# Patient Record
Sex: MALE | Race: WHITE | NOT HISPANIC OR LATINO | Employment: FULL TIME | RURAL
[De-identification: names, ages, dates, MRNs, and addresses within clinical notes are randomized per-mention and may not be internally consistent; named-entity substitution may affect disease eponyms.]

---

## 2017-01-07 ENCOUNTER — CONVERSION ENCOUNTER (OUTPATIENT)
Dept: FAMILY MEDICINE CLINIC | Facility: CLINIC | Age: 57
End: 2017-01-07

## 2017-01-07 LAB — HBA1C MFR BLD: 8 %

## 2017-01-09 LAB
BASOPHILS # BLD AUTO: 25 CELLS/UL (ref 0–200)
BASOPHILS NFR BLD AUTO: 0.4 %
EOSINOPHIL # BLD AUTO: 296 CELLS/UL (ref 15–500)
EOSINOPHIL # BLD AUTO: 4.7 %
ERYTHROCYTE [DISTWIDTH] IN BLOOD BY AUTOMATED COUNT: 14.3 % (ref 11–15)
FERRITIN SERPL-MCNC: 28 NG/ML (ref 20–380)
FOLATE SERPL-MCNC: 14.4 NG/ML
HCT VFR BLD AUTO: 43.1 % (ref 38.5–50)
HGB BLD-MCNC: 13.8 G/DL (ref 13.2–17.1)
IRON SATN MFR SERPL: 15 % (CALC) (ref 15–60)
IRON SERPL-MCNC: 61 MCG/DL (ref 50–180)
LYMPHOCYTES # BLD AUTO: 1285 CELLS/UL (ref 850–3900)
LYMPHOCYTES NFR BLD AUTO: 20.4 %
MCH RBC QN AUTO: 27.2 PG (ref 27–33)
MCHC RBC AUTO-ENTMCNC: 32.1 G/DL (ref 32–36)
MCV RBC AUTO: 84.7 FL (ref 80–100)
MONOCYTES # BLD AUTO: 573 CELLS/UL (ref 200–950)
MONOCYTES NFR BLD AUTO: 9.1 %
NEUTROPHILS # BLD AUTO: 4120 CELLS/UL (ref 1500–7800)
NEUTROPHILS NFR BLD AUTO: 65.4 %
PLATELET # BLD AUTO: 310 10*3/UL (ref 140–400)
PMV BLD AUTO: 9.1 FL (ref 7.5–11.5)
RBC # BLD AUTO: 5.09 MILLION/UL (ref 4.2–5.8)
RETICS/RBC NFR MANUAL: NORMAL CELLS/UL (ref 25000–90000)
RETICULOCYTES PERCENT: 0.6 %
UIBC SERPL-MCNC: 399 UG/DL (ref 250–425)
VIT B12 SERPL-MCNC: 209 PG/ML (ref 200–1100)
WBC # BLD AUTO: 6.3 10*3/UL (ref 3.8–10.8)

## 2017-12-02 ENCOUNTER — CONVERSION ENCOUNTER (OUTPATIENT)
Dept: FAMILY MEDICINE CLINIC | Facility: CLINIC | Age: 57
End: 2017-12-02

## 2017-12-03 LAB
ALBUMIN SERPL-MCNC: 4 G/DL (ref 3.6–5.1)
ALP SERPL-CCNC: 70 U/L (ref 40–115)
ALT SERPL-CCNC: 16 U/L (ref 9–46)
AST SERPL-CCNC: 14 U/L (ref 10–35)
BILIRUB SERPL-MCNC: 0.4 MG/DL (ref 0.2–1.2)
BUN SERPL-MCNC: 27 MG/DL (ref 7–25)
BUN/CREAT SERPL: 22 (CALC) (ref 6–22)
CALCIUM SERPL-MCNC: 9.7 MG/DL (ref 8.6–10.3)
CHLORIDE SERPL-SCNC: 101 MMOL/L (ref 98–110)
CHOLEST SERPL-MCNC: 170 MG/DL
CHOLEST/HDLC SERPL: 4.1 (CALC)
CONV CO2: 29 MMOL/L (ref 20–31)
CONV TOTAL PROTEIN: 6.9 G/DL (ref 6.1–8.1)
CREAT UR-MCNC: 1.23 MG/DL (ref 0.7–1.33)
GLOBULIN UR ELPH-MCNC: 2.9 MG/DL (ref 1.9–3.7)
GLUCOSE UR QL: 145 MG/DL (ref 65–99)
HBA1C MFR BLD: 7.9 %
HDLC SERPL-MCNC: 41 MG/DL
INSULIN SERPL-ACNC: 1.4 (CALC) (ref 1–2.5)
LDLC SERPL CALC-MCNC: 109 MG/DL
NONHDLC SERPL-MCNC: 129 MG/DL
POTASSIUM SERPL-SCNC: 5.1 MMOL/L (ref 3.5–5.3)
SODIUM SERPL-SCNC: 137 MMOL/L (ref 135–146)
TRIGL SERPL-MCNC: 108 MG/DL

## 2017-12-06 ENCOUNTER — CONVERSION ENCOUNTER (OUTPATIENT)
Dept: FAMILY MEDICINE CLINIC | Facility: CLINIC | Age: 57
End: 2017-12-06

## 2018-06-16 ENCOUNTER — CONVERSION ENCOUNTER (OUTPATIENT)
Dept: FAMILY MEDICINE CLINIC | Facility: CLINIC | Age: 58
End: 2018-06-16

## 2018-06-17 LAB
ALBUMIN SERPL-MCNC: 4 G/DL (ref 3.6–5.1)
ALP SERPL-CCNC: 67 U/L (ref 40–115)
ALT SERPL-CCNC: 16 U/L (ref 9–46)
AST SERPL-CCNC: 16 U/L (ref 10–35)
BILIRUB SERPL-MCNC: 0.4 MG/DL (ref 0.2–1.2)
BUN SERPL-MCNC: 31 MG/DL (ref 7–25)
BUN/CREAT SERPL: 25 (CALC) (ref 6–22)
CALCIUM SERPL-MCNC: 9.5 MG/DL (ref 8.6–10.3)
CHLORIDE SERPL-SCNC: 105 MMOL/L (ref 98–110)
CHOLEST SERPL-MCNC: 153 MG/DL
CHOLEST/HDLC SERPL: 3.6 (CALC)
CONV CO2: 23 MMOL/L (ref 20–31)
CONV TOTAL PROTEIN: 6.6 G/DL (ref 6.1–8.1)
CREAT UR-MCNC: 1.25 MG/DL (ref 0.7–1.33)
GLOBULIN UR ELPH-MCNC: 2.6 MG/DL (ref 1.9–3.7)
GLUCOSE UR QL: 167 MG/DL (ref 65–99)
HBA1C MFR BLD: 7.3 %
HDLC SERPL-MCNC: 42 MG/DL
INSULIN SERPL-ACNC: 1.5 (CALC) (ref 1–2.5)
LDLC SERPL CALC-MCNC: 91 MG/DL
NONHDLC SERPL-MCNC: 111 MG/DL
POTASSIUM SERPL-SCNC: 5.1 MMOL/L (ref 3.5–5.3)
SODIUM SERPL-SCNC: 138 MMOL/L (ref 135–146)
TRIGL SERPL-MCNC: 103 MG/DL

## 2018-10-12 ENCOUNTER — HOSPITAL ENCOUNTER (OUTPATIENT)
Dept: SLEEP MEDICINE | Facility: HOSPITAL | Age: 58
Discharge: HOME | End: 2018-10-12
Attending: PSYCHIATRY & NEUROLOGY | Admitting: PSYCHIATRY & NEUROLOGY

## 2018-12-15 ENCOUNTER — CONVERSION ENCOUNTER (OUTPATIENT)
Dept: FAMILY MEDICINE CLINIC | Facility: CLINIC | Age: 58
End: 2018-12-15

## 2018-12-15 LAB
ALBUMIN SERPL-MCNC: 3.9 G/DL (ref 3.6–5.1)
ALP SERPL-CCNC: 63 U/L (ref 40–115)
ALT SERPL-CCNC: 17 U/L (ref 9–46)
AST SERPL-CCNC: 16 U/L (ref 10–35)
BILIRUB SERPL-MCNC: 0.4 MG/DL (ref 0.2–1.2)
BUN SERPL-MCNC: 30 MG/DL (ref 7–25)
BUN/CREAT SERPL: 23 (CALC) (ref 6–22)
CALCIUM SERPL-MCNC: 9.2 MG/DL (ref 8.6–10.3)
CHLORIDE SERPL-SCNC: 106 MMOL/L (ref 98–110)
CHOLEST SERPL-MCNC: 136 MG/DL
CHOLEST/HDLC SERPL: 3.6 (CALC)
CONV CO2: 24 MMOL/L (ref 20–32)
CONV TOTAL PROTEIN: 6.5 G/DL (ref 6.1–8.1)
CREAT UR-MCNC: 1.29 MG/DL (ref 0.7–1.25)
GLOBULIN UR ELPH-MCNC: 2.6 MG/DL (ref 1.9–3.7)
GLUCOSE UR QL: 99 MG/DL (ref 65–99)
HBA1C MFR BLD: 7.3 %
HDLC SERPL-MCNC: 38 MG/DL
INSULIN SERPL-ACNC: 1.5 (CALC) (ref 1–2.5)
LDLC SERPL CALC-MCNC: 78 MG/DL
NONHDLC SERPL-MCNC: 98 MG/DL
POTASSIUM SERPL-SCNC: 4.6 MMOL/L (ref 3.5–5.3)
SODIUM SERPL-SCNC: 139 MMOL/L (ref 135–146)
TRIGL SERPL-MCNC: 123 MG/DL

## 2019-05-20 ENCOUNTER — HOSPITAL ENCOUNTER (OUTPATIENT)
Dept: OTHER | Facility: HOSPITAL | Age: 59
Discharge: HOME | End: 2019-05-20
Attending: FAMILY MEDICINE | Admitting: FAMILY MEDICINE

## 2019-05-20 ENCOUNTER — CONVERSION ENCOUNTER (OUTPATIENT)
Dept: FAMILY MEDICINE CLINIC | Facility: CLINIC | Age: 59
End: 2019-05-20

## 2019-05-21 LAB
ALBUMIN SERPL-MCNC: 3.9 G/DL (ref 3.6–5.1)
ALP SERPL-CCNC: 73 U/L (ref 40–115)
ALT SERPL-CCNC: 18 U/L (ref 9–46)
AST SERPL-CCNC: 16 U/L (ref 10–35)
BASOPHILS # BLD AUTO: 62 CELLS/UL (ref 0–200)
BASOPHILS NFR BLD AUTO: 0.8 %
BILIRUB SERPL-MCNC: 0.3 MG/DL (ref 0.2–1.2)
BUN SERPL-MCNC: 26 MG/DL (ref 7–25)
BUN/CREAT SERPL: 20 (CALC) (ref 6–22)
CALCIUM SERPL-MCNC: 8.9 MG/DL (ref 8.6–10.3)
CHLORIDE SERPL-SCNC: 103 MMOL/L (ref 98–110)
CONV CO2: 23 MMOL/L (ref 20–32)
CONV TOTAL PROTEIN: 7 G/DL (ref 6.1–8.1)
CREAT UR-MCNC: 1.27 MG/DL (ref 0.7–1.25)
EOSINOPHIL # BLD AUTO: 169 CELLS/UL (ref 15–500)
EOSINOPHIL # BLD AUTO: 2.2 %
ERYTHROCYTE [DISTWIDTH] IN BLOOD BY AUTOMATED COUNT: 14.6 % (ref 11–15)
GLOBULIN UR ELPH-MCNC: 3.1 MG/DL (ref 1.9–3.7)
GLUCOSE UR QL: 189 MG/DL (ref 65–99)
HCT VFR BLD AUTO: 42.1 % (ref 38.5–50)
HGB BLD-MCNC: 13.2 G/DL (ref 13.2–17.1)
INSULIN SERPL-ACNC: 1.3 (CALC) (ref 1–2.5)
LYMPHOCYTES # BLD AUTO: 1471 CELLS/UL (ref 850–3900)
LYMPHOCYTES NFR BLD AUTO: 19.1 %
MCH RBC QN AUTO: 26.3 PG (ref 27–33)
MCHC RBC AUTO-ENTMCNC: 31.4 G/DL (ref 32–36)
MCV RBC AUTO: 83.9 FL (ref 80–100)
MONOCYTES # BLD AUTO: 585 CELLS/UL (ref 200–950)
MONOCYTES NFR BLD AUTO: 7.6 %
NEUTROPHILS # BLD AUTO: 5413 CELLS/UL (ref 1500–7800)
NEUTROPHILS NFR BLD AUTO: 70.3 %
PLATELET # BLD AUTO: 425 10*3/UL (ref 140–400)
PMV BLD AUTO: 9.5 FL (ref 7.5–12.5)
POTASSIUM SERPL-SCNC: 4.6 MMOL/L (ref 3.5–5.3)
RBC # BLD AUTO: 5.02 MILLION/UL (ref 4.2–5.8)
SODIUM SERPL-SCNC: 137 MMOL/L (ref 135–146)
WBC # BLD AUTO: 7.7 10*3/UL (ref 3.8–10.8)

## 2019-06-12 VITALS
HEART RATE: 77 BPM | DIASTOLIC BLOOD PRESSURE: 76 MMHG | RESPIRATION RATE: 16 BRPM | BODY MASS INDEX: 38.08 KG/M2 | HEIGHT: 70 IN | OXYGEN SATURATION: 98 % | WEIGHT: 266 LBS | SYSTOLIC BLOOD PRESSURE: 128 MMHG

## 2019-06-17 ENCOUNTER — TELEPHONE (OUTPATIENT)
Dept: FAMILY MEDICINE CLINIC | Facility: CLINIC | Age: 59
End: 2019-06-17

## 2019-06-25 ENCOUNTER — OFFICE VISIT (OUTPATIENT)
Dept: FAMILY MEDICINE CLINIC | Facility: CLINIC | Age: 59
End: 2019-06-25

## 2019-06-25 VITALS
WEIGHT: 265.2 LBS | SYSTOLIC BLOOD PRESSURE: 126 MMHG | OXYGEN SATURATION: 100 % | TEMPERATURE: 98.2 F | HEART RATE: 68 BPM | RESPIRATION RATE: 18 BRPM | DIASTOLIC BLOOD PRESSURE: 68 MMHG | BODY MASS INDEX: 40.19 KG/M2 | HEIGHT: 68 IN

## 2019-06-25 DIAGNOSIS — E13.9 OTHER SPECIFIED DIABETES MELLITUS WITHOUT COMPLICATION, WITHOUT LONG-TERM CURRENT USE OF INSULIN (HCC): ICD-10-CM

## 2019-06-25 DIAGNOSIS — D64.9 ANEMIA, UNSPECIFIED TYPE: Primary | ICD-10-CM

## 2019-06-25 DIAGNOSIS — E53.8 VITAMIN B 12 DEFICIENCY: ICD-10-CM

## 2019-06-25 DIAGNOSIS — I10 HYPERTENSION, BENIGN: ICD-10-CM

## 2019-06-25 DIAGNOSIS — E78.5 HYPERLIPIDEMIA, UNSPECIFIED HYPERLIPIDEMIA TYPE: ICD-10-CM

## 2019-06-25 LAB
BILIRUB BLD-MCNC: NEGATIVE MG/DL
CLARITY, POC: CLEAR
COLOR UR: YELLOW
GLUCOSE UR STRIP-MCNC: ABNORMAL MG/DL
KETONES UR QL: NEGATIVE
LEUKOCYTE EST, POC: NEGATIVE
NITRITE UR-MCNC: NEGATIVE MG/ML
PH UR: 5 [PH] (ref 5–8)
POC CREATININE URINE: 0
POC MICROALBUMIN URINE: 0
PROT UR STRIP-MCNC: NEGATIVE MG/DL
RBC # UR STRIP: NEGATIVE /UL
SP GR UR: 1.01 (ref 1–1.03)
UROBILINOGEN UR QL: NORMAL

## 2019-06-25 PROCEDURE — 82044 UR ALBUMIN SEMIQUANTITATIVE: CPT | Performed by: FAMILY MEDICINE

## 2019-06-25 PROCEDURE — 99213 OFFICE O/P EST LOW 20 MIN: CPT | Performed by: FAMILY MEDICINE

## 2019-06-25 PROCEDURE — 81002 URINALYSIS NONAUTO W/O SCOPE: CPT | Performed by: FAMILY MEDICINE

## 2019-06-25 RX ORDER — GLYBURIDE 5 MG/1
TABLET ORAL
COMMUNITY
Start: 2018-09-06 | End: 2019-11-29 | Stop reason: SDUPTHER

## 2019-06-25 RX ORDER — FLUTICASONE PROPIONATE 50 MCG
2 SPRAY, SUSPENSION (ML) NASAL
COMMUNITY
Start: 2018-09-06 | End: 2019-08-27 | Stop reason: SDUPTHER

## 2019-06-25 RX ORDER — METOPROLOL TARTRATE 50 MG/1
TABLET, FILM COATED ORAL DAILY
COMMUNITY
Start: 2018-09-06 | End: 2019-07-01 | Stop reason: SDUPTHER

## 2019-06-25 RX ORDER — LOVASTATIN 40 MG/1
TABLET ORAL
COMMUNITY
Start: 2018-09-06 | End: 2019-12-26

## 2019-06-25 RX ORDER — LISINOPRIL 20 MG/1
TABLET ORAL DAILY
COMMUNITY
Start: 2018-09-06 | End: 2019-08-19 | Stop reason: SDUPTHER

## 2019-06-25 RX ORDER — PIOGLITAZONEHYDROCHLORIDE 45 MG/1
TABLET ORAL DAILY
COMMUNITY
Start: 2018-09-06 | End: 2019-09-23 | Stop reason: SDUPTHER

## 2019-06-25 RX ORDER — LANCETS
EACH MISCELLANEOUS DAILY
COMMUNITY
Start: 2018-08-01 | End: 2020-01-22

## 2019-06-25 RX ORDER — CLOPIDOGREL BISULFATE 75 MG/1
TABLET ORAL DAILY
COMMUNITY
Start: 2018-09-06 | End: 2020-01-14 | Stop reason: SDUPTHER

## 2019-06-29 LAB
ALBUMIN SERPL-MCNC: 3.9 G/DL (ref 3.6–4.8)
ALBUMIN/GLOB SERPL: 1.3 {RATIO} (ref 1.2–2.2)
ALP SERPL-CCNC: 77 IU/L (ref 39–117)
ALT SERPL-CCNC: 24 IU/L (ref 0–44)
AST SERPL-CCNC: 15 IU/L (ref 0–40)
BASOPHILS # BLD AUTO: 0 X10E3/UL (ref 0–0.2)
BASOPHILS NFR BLD AUTO: 1 %
BILIRUB SERPL-MCNC: 0.2 MG/DL (ref 0–1.2)
BUN SERPL-MCNC: 31 MG/DL (ref 8–27)
BUN/CREAT SERPL: 21 (ref 10–24)
CALCIUM SERPL-MCNC: 9.3 MG/DL (ref 8.6–10.2)
CHLORIDE SERPL-SCNC: 105 MMOL/L (ref 96–106)
CHOLEST SERPL-MCNC: 173 MG/DL (ref 100–199)
CO2 SERPL-SCNC: 22 MMOL/L (ref 20–29)
CREAT SERPL-MCNC: 1.48 MG/DL (ref 0.76–1.27)
EOSINOPHIL # BLD AUTO: 0.2 X10E3/UL (ref 0–0.4)
EOSINOPHIL NFR BLD AUTO: 3 %
ERYTHROCYTE [DISTWIDTH] IN BLOOD BY AUTOMATED COUNT: 15.7 % (ref 12.3–15.4)
GLOBULIN SER CALC-MCNC: 3.1 G/DL (ref 1.5–4.5)
GLUCOSE SERPL-MCNC: 200 MG/DL (ref 65–99)
HBA1C MFR BLD: 8.5 % (ref 4.8–5.6)
HCT VFR BLD AUTO: 42.3 % (ref 37.5–51)
HDLC SERPL-MCNC: 37 MG/DL
HGB BLD-MCNC: 13 G/DL (ref 13–17.7)
IMM GRANULOCYTES # BLD AUTO: 0 X10E3/UL (ref 0–0.1)
IMM GRANULOCYTES NFR BLD AUTO: 0 %
LDLC SERPL CALC-MCNC: 95 MG/DL (ref 0–99)
LYMPHOCYTES # BLD AUTO: 1.3 X10E3/UL (ref 0.7–3.1)
LYMPHOCYTES NFR BLD AUTO: 21 %
MCH RBC QN AUTO: 26.8 PG (ref 26.6–33)
MCHC RBC AUTO-ENTMCNC: 30.7 G/DL (ref 31.5–35.7)
MCV RBC AUTO: 87 FL (ref 79–97)
MONOCYTES # BLD AUTO: 0.6 X10E3/UL (ref 0.1–0.9)
MONOCYTES NFR BLD AUTO: 11 %
NEUTROPHILS # BLD AUTO: 3.9 X10E3/UL (ref 1.4–7)
NEUTROPHILS NFR BLD AUTO: 64 %
PLATELET # BLD AUTO: 327 X10E3/UL (ref 150–450)
POTASSIUM SERPL-SCNC: 5.2 MMOL/L (ref 3.5–5.2)
PROT SERPL-MCNC: 7 G/DL (ref 6–8.5)
RBC # BLD AUTO: 4.85 X10E6/UL (ref 4.14–5.8)
SODIUM SERPL-SCNC: 142 MMOL/L (ref 134–144)
TRIGL SERPL-MCNC: 203 MG/DL (ref 0–149)
VIT B2 BLD-MCNC: 200 UG/L (ref 137–370)
VLDLC SERPL CALC-MCNC: 41 MG/DL (ref 5–40)
WBC # BLD AUTO: 6.1 X10E3/UL (ref 3.4–10.8)

## 2019-06-30 ENCOUNTER — RESULTS ENCOUNTER (OUTPATIENT)
Dept: FAMILY MEDICINE CLINIC | Facility: CLINIC | Age: 59
End: 2019-06-30

## 2019-06-30 DIAGNOSIS — D64.9 ANEMIA, UNSPECIFIED TYPE: ICD-10-CM

## 2019-06-30 DIAGNOSIS — E13.9 OTHER SPECIFIED DIABETES MELLITUS WITHOUT COMPLICATION, WITHOUT LONG-TERM CURRENT USE OF INSULIN (HCC): ICD-10-CM

## 2019-06-30 DIAGNOSIS — E53.8 VITAMIN B 12 DEFICIENCY: ICD-10-CM

## 2019-06-30 DIAGNOSIS — E78.5 HYPERLIPIDEMIA, UNSPECIFIED HYPERLIPIDEMIA TYPE: ICD-10-CM

## 2019-07-01 ENCOUNTER — OFFICE VISIT (OUTPATIENT)
Dept: FAMILY MEDICINE CLINIC | Facility: CLINIC | Age: 59
End: 2019-07-01

## 2019-07-01 VITALS
OXYGEN SATURATION: 99 % | WEIGHT: 265 LBS | RESPIRATION RATE: 18 BRPM | BODY MASS INDEX: 37.94 KG/M2 | SYSTOLIC BLOOD PRESSURE: 126 MMHG | DIASTOLIC BLOOD PRESSURE: 78 MMHG | HEIGHT: 70 IN | TEMPERATURE: 97.2 F | HEART RATE: 67 BPM

## 2019-07-01 DIAGNOSIS — J30.1 SEASONAL ALLERGIC RHINITIS DUE TO POLLEN: ICD-10-CM

## 2019-07-01 DIAGNOSIS — R79.9 ELEVATED BUN: ICD-10-CM

## 2019-07-01 DIAGNOSIS — E53.8 VITAMIN B12 DEFICIENCY: ICD-10-CM

## 2019-07-01 DIAGNOSIS — Z86.73 HISTORY OF STROKE: ICD-10-CM

## 2019-07-01 DIAGNOSIS — E11.3293 TYPE 2 DIABETES MELLITUS WITH BOTH EYES AFFECTED BY MILD NONPROLIFERATIVE RETINOPATHY WITHOUT MACULAR EDEMA, WITHOUT LONG-TERM CURRENT USE OF INSULIN (HCC): ICD-10-CM

## 2019-07-01 DIAGNOSIS — I51.7 LEFT ATRIAL ENLARGEMENT: ICD-10-CM

## 2019-07-01 DIAGNOSIS — E11.21 DIABETIC NEPHROPATHY ASSOCIATED WITH TYPE 2 DIABETES MELLITUS (HCC): ICD-10-CM

## 2019-07-01 DIAGNOSIS — E11.9 TYPE 2 DIABETES MELLITUS WITHOUT COMPLICATION, UNSPECIFIED WHETHER LONG TERM INSULIN USE (HCC): ICD-10-CM

## 2019-07-01 DIAGNOSIS — Z00.00 ADULT GENERAL MEDICAL EXAMINATION: ICD-10-CM

## 2019-07-01 DIAGNOSIS — E78.2 MIXED HYPERLIPIDEMIA: Primary | ICD-10-CM

## 2019-07-01 DIAGNOSIS — H35.372 EPIRETINAL MEMBRANE, LEFT: ICD-10-CM

## 2019-07-01 DIAGNOSIS — L98.9 SKIN LESION: ICD-10-CM

## 2019-07-01 DIAGNOSIS — R53.83 MALAISE AND FATIGUE: ICD-10-CM

## 2019-07-01 DIAGNOSIS — Z85.46 HISTORY OF PROSTATE CANCER: ICD-10-CM

## 2019-07-01 DIAGNOSIS — I10 HYPERTENSION, BENIGN: ICD-10-CM

## 2019-07-01 DIAGNOSIS — Z82.49 FAMILY HISTORY OF ISCHEMIC HEART DISEASE: ICD-10-CM

## 2019-07-01 DIAGNOSIS — N28.9 RENAL INSUFFICIENCY, MILD: ICD-10-CM

## 2019-07-01 DIAGNOSIS — G47.33 OBSTRUCTIVE SLEEP APNEA: ICD-10-CM

## 2019-07-01 DIAGNOSIS — F43.9 SITUATIONAL STRESS: ICD-10-CM

## 2019-07-01 DIAGNOSIS — E66.3 OVERWEIGHT: ICD-10-CM

## 2019-07-01 DIAGNOSIS — D50.9 MICROCYTIC ANEMIA: ICD-10-CM

## 2019-07-01 DIAGNOSIS — H25.093 AGE-RELATED INCIPIENT CATARACT OF BOTH EYES: ICD-10-CM

## 2019-07-01 DIAGNOSIS — R53.81 MALAISE AND FATIGUE: ICD-10-CM

## 2019-07-01 PROBLEM — H26.9 CATARACTA: Status: ACTIVE | Noted: 2019-07-01

## 2019-07-01 PROBLEM — E78.5 HYPERLIPIDEMIA: Status: ACTIVE | Noted: 2018-09-06

## 2019-07-01 PROBLEM — N19 RENAL FAILURE: Status: ACTIVE | Noted: 2019-07-01

## 2019-07-01 PROBLEM — N40.0 BPH (BENIGN PROSTATIC HYPERPLASIA): Status: ACTIVE | Noted: 2019-07-01

## 2019-07-01 PROCEDURE — 99396 PREV VISIT EST AGE 40-64: CPT | Performed by: FAMILY MEDICINE

## 2019-07-01 PROCEDURE — 99214 OFFICE O/P EST MOD 30 MIN: CPT | Performed by: FAMILY MEDICINE

## 2019-07-01 RX ORDER — CETIRIZINE HYDROCHLORIDE 10 MG/1
10 TABLET ORAL DAILY
COMMUNITY
End: 2020-04-21

## 2019-07-01 RX ORDER — METOPROLOL SUCCINATE 50 MG/1
50 TABLET, EXTENDED RELEASE ORAL DAILY
Qty: 30 TABLET | Refills: 5 | Status: SHIPPED | OUTPATIENT
Start: 2019-07-01 | End: 2019-12-26

## 2019-07-01 NOTE — PROGRESS NOTES
Subjective   Thor Crouch is a 58 y.o. male here for his annual physical with me. Thor is here for coordination of medical care, to discuss health maintenance, disease prevention as well as to followup on medical problems. Patient has been followed by me since 2003. Patient's last CPE was 6-19-18. Activity level is minimal. Exercises 0 per week. Appetite is Good. Feels  WELL with no complaints. Energy level is Good.  Sleeps  WELL. Patient's last colonoscopy was never. Patient is not doing routine self skin exam. Patient is not doing routine self-breast exams.  Patient is not doing routine testicular exams.    Hypertension   This is a chronic problem. The current episode started more than 1 year ago. The problem is unchanged. The problem is controlled. Pertinent negatives include no blurred vision, chest pain, neck pain, palpitations or shortness of breath. Risk factors for coronary artery disease include diabetes mellitus, dyslipidemia and obesity.   Hyperlipidemia   This is a chronic problem. The current episode started more than 1 year ago. Recent lipid tests were reviewed and are normal. Exacerbating diseases include diabetes and obesity. There are no known factors aggravating his hyperlipidemia. Pertinent negatives include no chest pain, myalgias or shortness of breath.   Allergic Reaction   This is a chronic problem. The current episode started more than 1 week ago. The problem occurs intermittently. The problem is unchanged. The problem is mild. Pertinent negatives include no abdominal pain, chest pain, coughing, diarrhea, eye redness, rash, stridor, vomiting or wheezing.   Anemia   Presents for follow-up visit. There has been no abdominal pain, bruising/bleeding easily, fever, light-headedness, palpitations or weight loss. There are no compliance problems.         The following portions of the patient's history were reviewed and updated as appropriate: allergies, current medications, past family history, past  "medical history, past social history, past surgical history and problem list.    Review of Systems   Constitutional: Negative for appetite change, chills, fatigue, fever, unexpected weight gain and unexpected weight loss.   HENT: Negative for congestion, dental problem, ear discharge, ear pain, hearing loss, nosebleeds, postnasal drip, rhinorrhea, sinus pressure, sneezing, sore throat, tinnitus and voice change.         Dentist visit was 7-2013, [facility]-advised to follow   Eyes: Negative for blurred vision, double vision, pain, redness and visual disturbance.        2-2019, Dr. Murcia-stable   Respiratory: Negative for cough, shortness of breath, wheezing and stridor.    Cardiovascular: Negative for chest pain, palpitations and leg swelling.   Gastrointestinal: Negative for abdominal pain, anal bleeding, blood in stool, constipation, diarrhea, nausea, rectal pain, vomiting, GERD and indigestion.        7 BM weekly   Endocrine: Negative for cold intolerance, heat intolerance, polydipsia, polyphagia and polyuria.        Sex Drive He is a 0  She is a 0   Genitourinary: Negative for difficulty urinating, dysuria, frequency, hematuria and urgency.   Musculoskeletal: Negative for back pain, joint swelling, myalgias, neck pain and neck stiffness.   Skin: Negative for color change, dry skin and rash.   Neurological: Negative for dizziness, syncope, speech difficulty, weakness, light-headedness, headache and memory problem.   Hematological: Does not bruise/bleed easily.   Psychiatric/Behavioral: Negative for decreased concentration, sleep disturbance, depressed mood and stress. The patient is not nervous/anxious.        Objective   Visit Vitals  /78 (BP Location: Left arm, Patient Position: Sitting, Cuff Size: Large Adult)   Pulse 67   Temp 97.2 °F (36.2 °C) (Oral)   Resp 18   Ht 177.8 cm (70\")   Wt 120 kg (265 lb)   SpO2 99%   BMI 38.02 kg/m²     Physical Exam   Constitutional: He is oriented to person, place, and " time. He appears well-developed and well-nourished. No distress.   HENT:   Head: Normocephalic.   Right Ear: Hearing and external ear normal.   Left Ear: Hearing and external ear normal.   Nose: Nose normal.   Mouth/Throat: Oropharynx is clear and moist. No oropharyngeal exudate.   Eyes: Conjunctivae, EOM and lids are normal. Pupils are equal, round, and reactive to light. Right eye exhibits no discharge. Left eye exhibits no discharge. No scleral icterus.   Neck: Trachea normal, normal range of motion and full passive range of motion without pain. Neck supple.   Cardiovascular: Normal rate, regular rhythm, normal heart sounds and intact distal pulses.   No murmur heard.  Pulses:       Dorsalis pedis pulses are 0 on the right side, and 0 on the left side.        Posterior tibial pulses are 0 on the right side, and 1+ on the left side.   Pulmonary/Chest: Effort normal and breath sounds normal. He has no wheezes. He exhibits no tenderness.   Abdominal: Soft. Bowel sounds are normal. He exhibits no distension and no mass. There is no tenderness. No hernia.   Genitourinary: Rectum normal, prostate normal and penis normal. No penile tenderness.   Musculoskeletal: Normal range of motion. He exhibits no edema, tenderness or deformity.   Lymphadenopathy:     He has no cervical adenopathy.   Neurological: He is alert and oriented to person, place, and time. He has normal strength. He displays normal reflexes. No cranial nerve deficit or sensory deficit. He exhibits normal muscle tone. Coordination normal.   Skin: Skin is warm and dry. Lesion noted. No rash noted. He is not diaphoretic. No erythema.   Blue nevus of the right inner thigh and left inner thigh.  Brown circular lesion of the upper abdomen.  Onychomycosis of the left toe nails-mild.     Psychiatric: He has a normal mood and affect. His behavior is normal. Judgment and thought content normal.       Assessment/Plan    Problem List Items Addressed This Visit         Cardiovascular and Mediastinum    Hyperlipidemia - Primary    Overview     Labs done 6-26-19, read by me, reviewed with pt.  Trig. 203 up from 123, tot. Chol. 173 up from 136, HDL 37 down from 38, LDL 95 up from 78.  Worsening   Encouraged to watch fatty intake, exercise more, and lose weight. Compliant with medication because   Is not getting adequate diet and exercise  Goals developed at last visit were not met because  Follow up in 3  months  Care management needs are self-addressed.   Self-management abilities addressed and patient is capable of managing his own disease.             Relevant Orders    Comprehensive metabolic panel    Lipid Panel w/ Chol/HDL Ratio    Hypertension, benign    Overview     Doing well.   Encouraged to watch salt, exercise more and lose weight         Relevant Medications    metoprolol succinate XL (TOPROL XL) 50 MG 24 hr tablet    Left atrial enlargement    Relevant Medications    metoprolol succinate XL (TOPROL XL) 50 MG 24 hr tablet       Respiratory    Obstructive sleep apnea    Current Assessment & Plan     Doing well with C-Pap         Seasonal allergic rhinitis due to pollen    Current Assessment & Plan     Stable            Digestive    Vitamin B12 deficiency    Overview     Doing well.  Labs done 6-26-19, read by me, reviewed with pt.  B12 was 200              Endocrine    Diabetic nephropathy associated with type 2 diabetes mellitus (CMS/HCC)    Relevant Medications    Dapagliflozin Propanediol (FARXIGA) 10 MG tablet    Type 2 diabetes mellitus without complications (CMS/HCC)    Overview     Labs done 6-26-19, read by me, reviewed with pt.A1c was 8.5 up from 7.6.  Worsening.  Increase Farxiga to 10mg daily.  Encouraged to watch sugar intake, exercise more and lose weight.   Compliant with medication.   Monitoring sugar at home.   Follow up in 3 months  Care management needs are self-addressed.  Self-management abilities addressed and patient is capable of managing his own  disease.             Relevant Medications    Dapagliflozin Propanediol (FARXIGA) 10 MG tablet    Other Relevant Orders    Hemoglobin A1c    Type 2 diabetes mellitus with both eyes affected by mild nonproliferative retinopathy without macular edema, without long-term current use of insulin (CMS/[facility])    Relevant Medications    Dapagliflozin Propanediol (FARXIGA) 10 MG tablet       Musculoskeletal and Integument    Skin lesion    Current Assessment & Plan     Stable, followed with [facility].            Genitourinary    Renal insufficiency, mild    Overview      Labs done 6-26-19, read by me, reviewed with pt. Creatinine was 1.48 up from 1.27, EGFR was 51 down from 61            Hematopoietic and Hemostatic    Microcytic anemia    Overview     Doing well.   Labs done 6-26-19, read by me, reviewed with pt.  CBC was normal except MCHC was 30.7 down from 31.4, RDW was 15.7 up from 14.6         Relevant Orders    CBC w AUTO Differential       Other    Adult general medical examination    Overview     Medical record thoroughly reviewed and summarized in EMR, since last PE         Cataract of both eyes    Current Assessment & Plan     Stable, followed with Dr. Murcia, will obtain notes from Dr. Murcia         Elevated BUN    Overview     Worse.  Labs done 6-26-19, read by me, reviewed with pt.  BUN was 31 up from 26         Epiretinal membrane, left    Overview     Followed with Dr. Murcia         Current Assessment & Plan     Stable, followed with Dr. Murcia, will obtain notes from Dr. Murcia         Family history of ischemic heart disease    Overview     -Father MI at age 74, Mother had a stent at 70.  ---keep risk factors low.         History of prostate cancer    Current Assessment & Plan     Followed with DR. Washington         History of stroke    Current Assessment & Plan     Continue Plavix         Overweight    Current Assessment & Plan     Worse, pt. Gained 6 Lbs         Situational stress    Overview      Greatly improved         RESOLVED: Malaise and fatigue        Diagnoses and all orders for this visit:    1. Mixed hyperlipidemia (Primary)    2. Hypertension, benign    3. Left atrial enlargement    4. Obstructive sleep apnea    5. Seasonal allergic rhinitis due to pollen    6. Vitamin B12 deficiency    7. Diabetic nephropathy associated with type 2 diabetes mellitus (CMS/HCC)    8. Type 2 diabetes mellitus with both eyes affected by mild nonproliferative retinopathy without macular edema, without long-term current use of insulin (CMS/[facility])    9. Type 2 diabetes mellitus without complication, unspecified whether long term insulin use (CMS/[facility])    10. Skin lesion    11. Renal insufficiency, mild    12. Microcytic anemia    13. Adult general medical examination    14. Age-related incipient cataract of both eyes    15. Elevated BUN    16. Epiretinal membrane, left    17. Family history of ischemic heart disease    18. History of prostate cancer    19. History of stroke    20. Malaise and fatigue    21. Overweight    22. Situational stress             Encouraged to check his skin, testicles and breasts monthly. Reviewed immunizations and if due, patient counselled to check with insurance company for coverage;.

## 2019-07-01 NOTE — PROGRESS NOTES
Subjective   Thor Crouch is a 58 y.o. male.   Chief Complaint   Patient presents with   • Diabetes   • Hypertension     Diabetes   He presents for his follow-up diabetic visit. He has type 2 diabetes mellitus. No MedicAlert identification noted. There are no hypoglycemic associated symptoms. There are no diabetic associated symptoms. Pertinent negatives for diabetes include no chest pain and no fatigue. There are no hypoglycemic complications. Symptoms are stable. Diabetic complications include a CVA. Risk factors for coronary artery disease include diabetes mellitus, dyslipidemia and hypertension. Current diabetic treatment includes oral agent (triple therapy). He is compliant with treatment all of the time. He is following a diabetic diet. When asked about meal planning, he reported none. He has not had a previous visit with a dietitian.   Hypertension   This is a chronic problem. The current episode started more than 1 year ago. The problem is controlled. Pertinent negatives include no chest pain, palpitations or shortness of breath. Risk factors for coronary artery disease include diabetes mellitus, dyslipidemia and obesity. Hypertensive end-organ damage includes CVA.        The following portions of the patient's history were reviewed and updated as appropriate: allergies, current medications, past family history, past medical history, past social history, past surgical history and problem list.    Review of Systems   Constitutional: Negative for fatigue.   HENT: Negative for hearing loss.    Respiratory: Negative for shortness of breath.    Cardiovascular: Negative for chest pain and palpitations.   Genitourinary: Negative for frequency.        Nocturia   Musculoskeletal: Negative for joint swelling.   Neurological: Negative for memory problem.       Objective   Visit Vitals  /68 (BP Location: Right arm, Patient Position: Sitting, Cuff Size: Large Adult)   Pulse 68   Temp 98.2 °F (36.8 °C) (Oral)   Resp  "18   Ht 172.7 cm (68\")   Wt 120 kg (265 lb 3.2 oz)   SpO2 100%   BMI 40.32 kg/m²     Physical Exam   Constitutional: He is oriented to person, place, and time. He appears well-developed and well-nourished. He is cooperative.   HENT:   Head: Normocephalic.   Neck: Trachea normal. Neck supple. Carotid bruit is not present. No thyromegaly present.   Cardiovascular: Normal rate, regular rhythm and normal heart sounds. Exam reveals no gallop and no friction rub.   No murmur heard.  Pulmonary/Chest: Effort normal and breath sounds normal.   Neurological: He is alert and oriented to person, place, and time.   Skin: Skin is dry. No rash noted. Nails show no clubbing.   Vitals reviewed.      Assessment/Plan   Problem List Items Addressed This Visit        Cardiovascular and Mediastinum    Hyperlipidemia    Current Assessment & Plan     Labs drawn today, pt. will return for results at PE2         Relevant Medications    lovastatin (MEVACOR) 40 MG tablet    Other Relevant Orders    Comprehensive metabolic panel    Lipid panel    Hypertension, benign    Overview     Doing well.   Encouraged to watch salt, exercise more and lose weight         Relevant Medications    lisinopril (PRINIVIL,ZESTRIL) 20 MG tablet      Other Visit Diagnoses     Anemia, unspecified type    -  Primary    Relevant Orders    CBC w AUTO Differential    Vitamin B 12 deficiency        Relevant Orders    Vitamin B2    Other specified diabetes mellitus without complication, without long-term current use of insulin (CMS/[facility])        Relevant Medications    glyBURIDE (DIAbeta) 5 MG tablet    pioglitazone (ACTOS) 45 MG tablet    SITagliptin-metFORMIN HCl ER (JANUMET XR) 100-1000 MG tablet    Other Relevant Orders    Hemoglobin A1c    POCT microalbumin (Completed)    POCT urinalysis dipstick, manual (Completed)             "

## 2019-08-19 RX ORDER — LISINOPRIL 20 MG/1
TABLET ORAL
Qty: 90 TABLET | Refills: 1 | Status: SHIPPED | OUTPATIENT
Start: 2019-08-19 | End: 2019-09-18 | Stop reason: SDUPTHER

## 2019-08-27 RX ORDER — FLUTICASONE PROPIONATE 50 MCG
SPRAY, SUSPENSION (ML) NASAL
Qty: 16 ML | Refills: 3 | Status: SHIPPED | OUTPATIENT
Start: 2019-08-27 | End: 2020-04-21 | Stop reason: SDUPTHER

## 2019-09-05 ENCOUNTER — TELEPHONE (OUTPATIENT)
Dept: FAMILY MEDICINE CLINIC | Facility: CLINIC | Age: 59
End: 2019-09-05

## 2019-09-18 RX ORDER — LISINOPRIL 20 MG/1
TABLET ORAL
Qty: 90 TABLET | Refills: 0 | Status: SHIPPED | OUTPATIENT
Start: 2019-09-18 | End: 2020-04-21 | Stop reason: SDUPTHER

## 2019-09-23 RX ORDER — PIOGLITAZONEHYDROCHLORIDE 45 MG/1
45 TABLET ORAL DAILY
Qty: 30 TABLET | Refills: 5 | Status: SHIPPED | OUTPATIENT
Start: 2019-09-23 | End: 2020-04-21

## 2019-10-09 ENCOUNTER — TELEPHONE (OUTPATIENT)
Dept: FAMILY MEDICINE CLINIC | Facility: CLINIC | Age: 59
End: 2019-10-09

## 2019-10-09 LAB
ALBUMIN SERPL-MCNC: 4.5 G/DL (ref 3.6–4.8)
ALBUMIN/GLOB SERPL: 2 {RATIO} (ref 1.2–2.2)
ALP SERPL-CCNC: 79 IU/L (ref 39–117)
ALT SERPL-CCNC: 24 IU/L (ref 0–44)
AST SERPL-CCNC: 18 IU/L (ref 0–40)
BASOPHILS # BLD AUTO: 0 X10E3/UL (ref 0–0.2)
BASOPHILS NFR BLD AUTO: 1 %
BILIRUB SERPL-MCNC: 0.2 MG/DL (ref 0–1.2)
BUN SERPL-MCNC: 21 MG/DL (ref 8–27)
BUN/CREAT SERPL: 17 (ref 10–24)
CALCIUM SERPL-MCNC: 9.3 MG/DL (ref 8.6–10.2)
CHLORIDE SERPL-SCNC: 102 MMOL/L (ref 96–106)
CHOLEST SERPL-MCNC: 143 MG/DL (ref 100–199)
CO2 SERPL-SCNC: 21 MMOL/L (ref 20–29)
CREAT SERPL-MCNC: 1.27 MG/DL (ref 0.76–1.27)
EOSINOPHIL # BLD AUTO: 0.3 X10E3/UL (ref 0–0.4)
EOSINOPHIL NFR BLD AUTO: 5 %
ERYTHROCYTE [DISTWIDTH] IN BLOOD BY AUTOMATED COUNT: 14.7 % (ref 12.3–15.4)
GLOBULIN SER CALC-MCNC: 2.2 G/DL (ref 1.5–4.5)
GLUCOSE SERPL-MCNC: 220 MG/DL (ref 65–99)
HBA1C MFR BLD: 8.3 % (ref 4.8–5.6)
HCT VFR BLD AUTO: 43.8 % (ref 37.5–51)
HDLC SERPL-MCNC: 35 MG/DL
HGB BLD-MCNC: 13.7 G/DL (ref 13–17.7)
IMM GRANULOCYTES # BLD AUTO: 0 X10E3/UL (ref 0–0.1)
IMM GRANULOCYTES NFR BLD AUTO: 0 %
LDLC SERPL CALC-MCNC: 77 MG/DL (ref 0–99)
LYMPHOCYTES # BLD AUTO: 1.2 X10E3/UL (ref 0.7–3.1)
LYMPHOCYTES NFR BLD AUTO: 20 %
MCH RBC QN AUTO: 27.7 PG (ref 26.6–33)
MCHC RBC AUTO-ENTMCNC: 31.3 G/DL (ref 31.5–35.7)
MCV RBC AUTO: 89 FL (ref 79–97)
MONOCYTES # BLD AUTO: 0.6 X10E3/UL (ref 0.1–0.9)
MONOCYTES NFR BLD AUTO: 11 %
NEUTROPHILS # BLD AUTO: 3.9 X10E3/UL (ref 1.4–7)
NEUTROPHILS NFR BLD AUTO: 63 %
PLATELET # BLD AUTO: 317 X10E3/UL (ref 150–450)
POTASSIUM SERPL-SCNC: 4.9 MMOL/L (ref 3.5–5.2)
PROT SERPL-MCNC: 6.7 G/DL (ref 6–8.5)
RBC # BLD AUTO: 4.95 X10E6/UL (ref 4.14–5.8)
SODIUM SERPL-SCNC: 139 MMOL/L (ref 134–144)
TRIGL SERPL-MCNC: 155 MG/DL (ref 0–149)
VIT B2 BLD-MCNC: 188 UG/L (ref 137–370)
VLDLC SERPL CALC-MCNC: 31 MG/DL (ref 5–40)
WBC # BLD AUTO: 6.1 X10E3/UL (ref 3.4–10.8)

## 2019-10-14 ENCOUNTER — OFFICE VISIT (OUTPATIENT)
Dept: FAMILY MEDICINE CLINIC | Facility: CLINIC | Age: 59
End: 2019-10-14

## 2019-10-14 VITALS
HEIGHT: 60 IN | TEMPERATURE: 97.7 F | BODY MASS INDEX: 52.06 KG/M2 | SYSTOLIC BLOOD PRESSURE: 136 MMHG | RESPIRATION RATE: 18 BRPM | DIASTOLIC BLOOD PRESSURE: 74 MMHG | OXYGEN SATURATION: 98 % | WEIGHT: 265.2 LBS | HEART RATE: 78 BPM

## 2019-10-14 DIAGNOSIS — N28.9 RENAL INSUFFICIENCY, MILD: ICD-10-CM

## 2019-10-14 DIAGNOSIS — E78.2 MIXED HYPERLIPIDEMIA: Primary | ICD-10-CM

## 2019-10-14 DIAGNOSIS — R79.9 ELEVATED BUN: ICD-10-CM

## 2019-10-14 DIAGNOSIS — D50.9 MICROCYTIC ANEMIA: ICD-10-CM

## 2019-10-14 DIAGNOSIS — I10 HYPERTENSION, BENIGN: ICD-10-CM

## 2019-10-14 DIAGNOSIS — E11.9 TYPE 2 DIABETES MELLITUS WITHOUT COMPLICATION, WITHOUT LONG-TERM CURRENT USE OF INSULIN (HCC): ICD-10-CM

## 2019-10-14 PROCEDURE — 99214 OFFICE O/P EST MOD 30 MIN: CPT | Performed by: FAMILY MEDICINE

## 2019-11-06 ENCOUNTER — RESULTS ENCOUNTER (OUTPATIENT)
Dept: FAMILY MEDICINE CLINIC | Facility: CLINIC | Age: 59
End: 2019-11-06

## 2019-11-06 DIAGNOSIS — E11.9 TYPE 2 DIABETES MELLITUS WITHOUT COMPLICATION, UNSPECIFIED WHETHER LONG TERM INSULIN USE (HCC): ICD-10-CM

## 2019-11-06 DIAGNOSIS — D50.9 MICROCYTIC ANEMIA: ICD-10-CM

## 2019-11-06 DIAGNOSIS — E78.2 MIXED HYPERLIPIDEMIA: ICD-10-CM

## 2019-11-24 DIAGNOSIS — E11.9 TYPE 2 DIABETES MELLITUS WITHOUT COMPLICATION, WITHOUT LONG-TERM CURRENT USE OF INSULIN (HCC): Primary | ICD-10-CM

## 2019-11-26 RX ORDER — GLYBURIDE 5 MG/1
TABLET ORAL
Qty: 120 TABLET | Refills: 12 | OUTPATIENT
Start: 2019-11-26

## 2019-11-29 DIAGNOSIS — E11.9 TYPE 2 DIABETES MELLITUS WITHOUT COMPLICATION, WITHOUT LONG-TERM CURRENT USE OF INSULIN (HCC): Primary | ICD-10-CM

## 2019-12-02 DIAGNOSIS — E11.9 TYPE 2 DIABETES MELLITUS WITHOUT COMPLICATION, WITHOUT LONG-TERM CURRENT USE OF INSULIN (HCC): ICD-10-CM

## 2019-12-02 RX ORDER — GLYBURIDE 5 MG/1
10 TABLET ORAL 2 TIMES DAILY
Qty: 120 TABLET | Refills: 1 | Status: SHIPPED | OUTPATIENT
Start: 2019-12-02 | End: 2019-12-04 | Stop reason: SDUPTHER

## 2019-12-02 RX ORDER — GLYBURIDE 5 MG/1
TABLET ORAL
Qty: 120 TABLET | Refills: 1 | Status: SHIPPED | OUTPATIENT
Start: 2019-12-02 | End: 2019-12-02 | Stop reason: SDUPTHER

## 2019-12-02 NOTE — TELEPHONE ENCOUNTER
Thor called needs to resend script for       glyburide (DIAbeta) 5 MG tablet    can in [place]

## 2019-12-04 RX ORDER — GLYBURIDE 5 MG/1
10 TABLET ORAL 2 TIMES DAILY
Qty: 120 TABLET | Refills: 2 | Status: SHIPPED | OUTPATIENT
Start: 2019-12-04 | End: 2020-01-14 | Stop reason: SDUPTHER

## 2019-12-25 DIAGNOSIS — I10 HYPERTENSION, BENIGN: ICD-10-CM

## 2019-12-25 DIAGNOSIS — E78.2 MIXED HYPERLIPIDEMIA: Primary | ICD-10-CM

## 2019-12-26 RX ORDER — METOPROLOL SUCCINATE 50 MG/1
50 TABLET, EXTENDED RELEASE ORAL DAILY
Qty: 30 TABLET | Refills: 5 | Status: SHIPPED | OUTPATIENT
Start: 2019-12-26 | End: 2020-04-21 | Stop reason: SDUPTHER

## 2019-12-26 RX ORDER — LOVASTATIN 40 MG/1
TABLET ORAL
Qty: 30 TABLET | Refills: 5 | Status: SHIPPED | OUTPATIENT
Start: 2019-12-26 | End: 2020-01-14 | Stop reason: SDUPTHER

## 2020-01-02 DIAGNOSIS — E78.2 MIXED HYPERLIPIDEMIA: Primary | ICD-10-CM

## 2020-01-02 DIAGNOSIS — E11.9 TYPE 2 DIABETES MELLITUS WITHOUT COMPLICATION, WITHOUT LONG-TERM CURRENT USE OF INSULIN (HCC): ICD-10-CM

## 2020-01-02 DIAGNOSIS — D50.9 MICROCYTIC ANEMIA: ICD-10-CM

## 2020-01-04 ENCOUNTER — RESULTS ENCOUNTER (OUTPATIENT)
Dept: FAMILY MEDICINE CLINIC | Facility: CLINIC | Age: 60
End: 2020-01-04

## 2020-01-04 DIAGNOSIS — E78.2 MIXED HYPERLIPIDEMIA: ICD-10-CM

## 2020-01-04 DIAGNOSIS — E11.9 TYPE 2 DIABETES MELLITUS WITHOUT COMPLICATION, WITHOUT LONG-TERM CURRENT USE OF INSULIN (HCC): ICD-10-CM

## 2020-01-04 DIAGNOSIS — D50.9 MICROCYTIC ANEMIA: ICD-10-CM

## 2020-01-07 LAB
ALBUMIN SERPL-MCNC: 4.7 G/DL (ref 3.6–4.8)
ALBUMIN/GLOB SERPL: 2.1 {RATIO} (ref 1.2–2.2)
ALP SERPL-CCNC: 84 IU/L (ref 39–117)
ALT SERPL-CCNC: 47 IU/L (ref 0–44)
AST SERPL-CCNC: 34 IU/L (ref 0–40)
BASOPHILS # BLD AUTO: 0.1 X10E3/UL (ref 0–0.2)
BASOPHILS NFR BLD AUTO: 1 %
BILIRUB SERPL-MCNC: 0.4 MG/DL (ref 0–1.2)
BUN SERPL-MCNC: 22 MG/DL (ref 8–27)
BUN/CREAT SERPL: 18 (ref 10–24)
CALCIUM SERPL-MCNC: 9.5 MG/DL (ref 8.6–10.2)
CHLORIDE SERPL-SCNC: 100 MMOL/L (ref 96–106)
CHOLEST SERPL-MCNC: 137 MG/DL (ref 100–199)
CHOLEST/HDLC SERPL: 4 RATIO (ref 0–5)
CO2 SERPL-SCNC: 22 MMOL/L (ref 20–29)
CREAT SERPL-MCNC: 1.25 MG/DL (ref 0.76–1.27)
EOSINOPHIL # BLD AUTO: 0.2 X10E3/UL (ref 0–0.4)
EOSINOPHIL NFR BLD AUTO: 3 %
ERYTHROCYTE [DISTWIDTH] IN BLOOD BY AUTOMATED COUNT: 14.9 % (ref 12.3–15.4)
GLOBULIN SER CALC-MCNC: 2.2 G/DL (ref 1.5–4.5)
GLUCOSE SERPL-MCNC: 253 MG/DL (ref 65–99)
HBA1C MFR BLD: 10.8 % (ref 4.8–5.6)
HCT VFR BLD AUTO: 47.1 % (ref 37.5–51)
HDLC SERPL-MCNC: 34 MG/DL
HGB BLD-MCNC: 14.4 G/DL (ref 13–17.7)
IMM GRANULOCYTES # BLD AUTO: 0 X10E3/UL (ref 0–0.1)
IMM GRANULOCYTES NFR BLD AUTO: 0 %
LDLC SERPL CALC-MCNC: 71 MG/DL (ref 0–99)
LYMPHOCYTES # BLD AUTO: 1.3 X10E3/UL (ref 0.7–3.1)
LYMPHOCYTES NFR BLD AUTO: 21 %
MCH RBC QN AUTO: 27.3 PG (ref 26.6–33)
MCHC RBC AUTO-ENTMCNC: 30.6 G/DL (ref 31.5–35.7)
MCV RBC AUTO: 89 FL (ref 79–97)
MONOCYTES # BLD AUTO: 0.7 X10E3/UL (ref 0.1–0.9)
MONOCYTES NFR BLD AUTO: 11 %
NEUTROPHILS # BLD AUTO: 4.2 X10E3/UL (ref 1.4–7)
NEUTROPHILS NFR BLD AUTO: 64 %
PLATELET # BLD AUTO: 391 X10E3/UL (ref 150–450)
POTASSIUM SERPL-SCNC: 5.5 MMOL/L (ref 3.5–5.2)
PROT SERPL-MCNC: 6.9 G/DL (ref 6–8.5)
RBC # BLD AUTO: 5.28 X10E6/UL (ref 4.14–5.8)
SODIUM SERPL-SCNC: 142 MMOL/L (ref 134–144)
TRIGL SERPL-MCNC: 161 MG/DL (ref 0–149)
VLDLC SERPL CALC-MCNC: 32 MG/DL (ref 5–40)
WBC # BLD AUTO: 6.4 X10E3/UL (ref 3.4–10.8)

## 2020-01-13 ENCOUNTER — RESULTS ENCOUNTER (OUTPATIENT)
Dept: FAMILY MEDICINE CLINIC | Facility: CLINIC | Age: 60
End: 2020-01-13

## 2020-01-13 DIAGNOSIS — E78.2 MIXED HYPERLIPIDEMIA: ICD-10-CM

## 2020-01-13 DIAGNOSIS — E11.9 TYPE 2 DIABETES MELLITUS WITHOUT COMPLICATION, WITHOUT LONG-TERM CURRENT USE OF INSULIN (HCC): ICD-10-CM

## 2020-01-14 ENCOUNTER — OFFICE VISIT (OUTPATIENT)
Dept: FAMILY MEDICINE CLINIC | Facility: CLINIC | Age: 60
End: 2020-01-14

## 2020-01-14 VITALS
HEART RATE: 89 BPM | BODY MASS INDEX: 36.39 KG/M2 | WEIGHT: 254.2 LBS | SYSTOLIC BLOOD PRESSURE: 129 MMHG | DIASTOLIC BLOOD PRESSURE: 80 MMHG | RESPIRATION RATE: 18 BRPM | TEMPERATURE: 97 F | HEIGHT: 70 IN | OXYGEN SATURATION: 95 %

## 2020-01-14 DIAGNOSIS — E87.5 HYPERKALEMIA: ICD-10-CM

## 2020-01-14 DIAGNOSIS — E78.2 MIXED HYPERLIPIDEMIA: ICD-10-CM

## 2020-01-14 DIAGNOSIS — R19.7 DIARRHEA, UNSPECIFIED TYPE: ICD-10-CM

## 2020-01-14 DIAGNOSIS — D50.9 MICROCYTIC ANEMIA: ICD-10-CM

## 2020-01-14 DIAGNOSIS — I51.7 LEFT ATRIAL ENLARGEMENT: ICD-10-CM

## 2020-01-14 DIAGNOSIS — E08.65 DIABETES MELLITUS DUE TO UNDERLYING CONDITION, UNCONTROLLED, WITH HYPERGLYCEMIA (HCC): Primary | ICD-10-CM

## 2020-01-14 DIAGNOSIS — I10 HYPERTENSION, BENIGN: ICD-10-CM

## 2020-01-14 DIAGNOSIS — R79.89 ELEVATED LIVER FUNCTION TESTS: ICD-10-CM

## 2020-01-14 DIAGNOSIS — N18.9 CHRONIC KIDNEY DISEASE, UNSPECIFIED CKD STAGE: ICD-10-CM

## 2020-01-14 DIAGNOSIS — E11.9 TYPE 2 DIABETES MELLITUS WITHOUT COMPLICATION, WITHOUT LONG-TERM CURRENT USE OF INSULIN (HCC): ICD-10-CM

## 2020-01-14 PROBLEM — IMO0002 DIABETES MELLITUS DUE TO UNDERLYING CONDITION, UNCONTROLLED: Status: ACTIVE | Noted: 2020-01-14

## 2020-01-14 PROCEDURE — 99214 OFFICE O/P EST MOD 30 MIN: CPT | Performed by: FAMILY MEDICINE

## 2020-01-14 RX ORDER — GLYBURIDE 5 MG/1
10 TABLET ORAL 2 TIMES DAILY
Qty: 120 TABLET | Refills: 3 | Status: SHIPPED | OUTPATIENT
Start: 2020-01-14 | End: 2020-04-21 | Stop reason: SDUPTHER

## 2020-01-14 RX ORDER — CLOPIDOGREL BISULFATE 75 MG/1
75 TABLET ORAL DAILY
Qty: 90 TABLET | Refills: 3 | Status: SHIPPED | OUTPATIENT
Start: 2020-01-14 | End: 2020-04-21 | Stop reason: SDUPTHER

## 2020-01-14 RX ORDER — LOVASTATIN 40 MG/1
40 TABLET ORAL EVERY EVENING
Qty: 90 TABLET | Refills: 3 | Status: SHIPPED | OUTPATIENT
Start: 2020-01-14 | End: 2020-04-21 | Stop reason: SDUPTHER

## 2020-01-14 NOTE — ASSESSMENT & PLAN NOTE
Stable.  Labs done 1-4-2020, read by me, reviewed with pt. CBC showed MCHC was 30.6 down from 31.3

## 2020-01-14 NOTE — ASSESSMENT & PLAN NOTE
Labs done 1-4-2020, read by me, reviewed with pt. A1c was 10.8 up from 8.3  Worsening.   Encouraged to watch sugar intake, exercise more and lose weight.   Non-compliant with medication pt. Stated due to diarrhea  Not monitoring sugar at home.   Follow up in 2-3 weeks  Care management needs are self-addressed. Would benefit from care management. Self-management abilities addressed and patient is capable of managing his/her own disease.

## 2020-01-14 NOTE — PROGRESS NOTES
Subjective   Thor Crouch is a 59 y.o. male.     Hypertension   This is a chronic problem. The current episode started more than 1 year ago. The problem is unchanged. The problem is controlled. Pertinent negatives include no chest pain, palpitations or shortness of breath. There are no associated agents to hypertension. Risk factors for coronary artery disease include diabetes mellitus, dyslipidemia, obesity and male gender.   Diarrhea    This is a new problem. The current episode started 1 to 4 weeks ago. The problem has been resolved. The stool consistency is described as watery. The patient states that diarrhea does not awaken him from sleep. Pertinent negatives include no myalgias or weight loss. Risk factors include ill contacts. He has tried nothing for the symptoms.   Diabetes   He presents for his follow-up diabetic visit. He has type 2 diabetes mellitus. No MedicAlert identification noted. His disease course has been worsening. Pertinent negatives for diabetes include no chest pain, no fatigue, no polyphagia, no polyuria and no weight loss. Risk factors for coronary artery disease include diabetes mellitus, dyslipidemia, hypertension, obesity and male sex. Current diabetic treatment includes oral agent (triple therapy). He is compliant with treatment some of the time. He has not had a previous visit with a dietitian. He never participates in exercise. He does not see a podiatrist.  Hyperlipidemia   Pertinent negatives include no chest pain, myalgias or shortness of breath.        The following portions of the patient's history were reviewed and updated as appropriate: allergies, current medications, past family history, past medical history, past social history, past surgical history and problem list.    Patient Active Problem List   Diagnosis   • Adult general medical examination   • Cataract of both eyes   • Diabetic nephropathy associated with type 2 diabetes mellitus (CMS/[facility])   • Elevated BUN   •  Epiretinal membrane, left   • Family history of ischemic heart disease   • History of prostate cancer   • History of stroke   • Hyperlipidemia   • Hypertension, benign   • Left atrial enlargement   • Obstructive sleep apnea   • Overweight   • Renal insufficiency, mild   • Seasonal allergic rhinitis due to pollen   • Situational stress   • Skin lesion   • Type 2 diabetes mellitus without complications (CMS/HCC)   • Type 2 diabetes mellitus with both eyes affected by mild nonproliferative retinopathy without macular edema, without long-term current use of insulin (CMS/[facility])   • Vitamin B12 deficiency   • BPH (benign prostatic hyperplasia)   • Renal failure   • Microcytic anemia   • Diabetes mellitus due to underlying condition, uncontrolled (CMS/[facility])   • Elevated liver function tests   • Hyperkalemia   • Diarrhea       Current Outpatient Medications on File Prior to Visit   Medication Sig Dispense Refill   • cetirizine (zyrTEC) 10 MG tablet Take 10 mg by mouth Daily.     • Empagliflozin (JARDIANCE) 10 MG tablet Take 10 mg by mouth Daily. 90 tablet 3   • fluticasone (FLONASE) 50 MCG/ACT nasal spray USE 2 SPRAYS IN EACH NOSTRIL ONCE DAILY 16 mL 3   • lisinopril (PRINIVIL,ZESTRIL) 20 MG tablet TAKE 1 TABLET BY MOUTH EVERY DAY 90 tablet 0   • metoprolol succinate XL (TOPROL-XL) 50 MG 24 hr tablet TAKE 1 TABLET BY MOUTH DAILY 30 tablet 5   • pioglitazone (ACTOS) 45 MG tablet Take 1 tablet by mouth Daily. 30 tablet 5   • SITagliptin-metFORMIN HCl ER (JANUMET XR) 100-1000 MG tablet Take  by mouth Daily.       No current facility-administered medications on file prior to visit.        Allergies   Allergen Reactions   • Sulfa Antibiotics Dizziness       Review of Systems   Constitutional: Negative for fatigue, unexpected weight gain and unexpected weight loss.   Eyes: Negative for visual disturbance.   Respiratory: Negative for shortness of breath.    Cardiovascular: Negative for chest pain, palpitations and leg swelling.    Gastrointestinal: Positive for diarrhea. Negative for nausea.   Endocrine: Negative for polyphagia and polyuria.   Genitourinary: Negative for frequency.   Musculoskeletal: Negative for myalgias.   Skin: Negative for dry skin and skin lesions.   Neurological: Negative for syncope, numbness and headache.     I have reviewed and confirmed the accuracy of the ROS as documented by the MA/LPN/RN Hayes Sharp MD      Objective   Vitals:    01/14/20 1739   BP: 129/80   Pulse: 89   Resp: 18   Temp: 97 °F (36.1 °C)   SpO2: 95%     Physical Exam   Constitutional: He is oriented to person, place, and time. He appears well-developed and well-nourished. He is cooperative.   HENT:   Head: Normocephalic.   Neck: Trachea normal. Neck supple. Carotid bruit is not present. No thyromegaly present.   Cardiovascular: Normal rate and regular rhythm. Exam reveals no gallop and no friction rub.   No murmur heard.  Pulmonary/Chest: Effort normal and breath sounds normal. No respiratory distress. He has no wheezes. He exhibits no tenderness.   Abdominal: Soft. Normal appearance. There is no tenderness.   Neurological: He is alert and oriented to person, place, and time.   Skin: Skin is dry. No rash noted. Nails show no clubbing.   Nursing note and vitals reviewed.        Assessment/Plan .  Problem List Items Addressed This Visit        Medium    Hypertension, benign    Current Assessment & Plan     Hypertension is unchanged.  Continue current treatment regimen.  Dietary sodium restriction.  Weight loss.  Regular aerobic exercise.  Continue current medications.  Blood pressure will be reassessed in 3 months.            Unprioritized    Diabetes mellitus due to underlying condition, uncontrolled (CMS/[facility]) - Primary    Current Assessment & Plan     Labs done 1-4-2020, read by me, reviewed with pt. A1c was 10.8 up from 8.3  Worsening.   Encouraged to watch sugar intake, exercise more and lose weight.   Non-compliant with medication pt.  Stated due to diarrhea  Not monitoring sugar at home.   Follow up in 2-3 weeks  Care management needs are self-addressed. Would benefit from care management. Self-management abilities addressed and patient is capable of managing his/her own disease.           Relevant Medications    glyburide (DIAbeta) 5 MG tablet    Diarrhea    Current Assessment & Plan     Improved off Janumet         Elevated liver function tests    Current Assessment & Plan     Worse.  Labs done 1-4-2020, read by me, reviewed with pt. ALT was 47 up from 24, AST was normal           Hyperkalemia    Current Assessment & Plan     New dx.  Labs done 1-4-2020, read by me, reviewed with pt.  Potassium was 5.5 up from 4.9         Hyperlipidemia    Current Assessment & Plan     Labs done 1-4-2020, read by me, reviewed with pt.  Trig. 161 up from 155, Tot. Chol. 137 down from 143, HDL 34 down from 35, LDL 71 down from 77.  Worsening   Encouraged to watch fatty intake, exercise more, and lose weight.   Non-compliant with medication    Is not getting adequate diet and exercise  Goals developed at last visit were not met because diet and exercise  Follow up in   3 months  Care management needs are self-addressed.  Self-management abilities addressed and patient is capable of managing her own disease.         Relevant Medications    lovastatin (MEVACOR) 40 MG tablet    Left atrial enlargement    Relevant Medications    clopidogrel (PLAVIX) 75 MG tablet    Microcytic anemia    Current Assessment & Plan     Stable.  Labs done 1-4-2020, read by me, reviewed with pt. CBC showed MCHC was 30.6 down from 31.3         Renal failure    Current Assessment & Plan     Resolved,Labs done 1-4-2020, read by me, reviewed with pt.  Creatinine was 1.25 down from 1.27, EGFR was 62 up from 61         Type 2 diabetes mellitus without complications (CMS/[facility])    Overview                  Relevant Medications    glyburide (DIAbeta) 5 MG tablet

## 2020-01-14 NOTE — ASSESSMENT & PLAN NOTE
Resolved,Labs done 1-4-2020, read by me, reviewed with pt.  Creatinine was 1.25 down from 1.27, EGFR was 62 up from 61

## 2020-01-14 NOTE — ASSESSMENT & PLAN NOTE
Labs done 1-4-2020, read by me, reviewed with pt.  Trig. 161 up from 155, Tot. Chol. 137 down from 143, HDL 34 down from 35, LDL 71 down from 77.  Worsening   Encouraged to watch fatty intake, exercise more, and lose weight.   -compliant with medication    Is not getting adequate diet and exercise  Goals developed at last visit were not met because diet and exercise  Follow up in   3 months  Care management needs are self-addressed.  Self-management abilities addressed and patient is capable of managing her own disease.

## 2020-01-22 ENCOUNTER — OFFICE VISIT (OUTPATIENT)
Dept: NEUROLOGY | Facility: CLINIC | Age: 60
End: 2020-01-22

## 2020-01-22 VITALS
DIASTOLIC BLOOD PRESSURE: 79 MMHG | WEIGHT: 255.2 LBS | HEART RATE: 77 BPM | SYSTOLIC BLOOD PRESSURE: 159 MMHG | BODY MASS INDEX: 36.54 KG/M2 | HEIGHT: 70 IN

## 2020-01-22 DIAGNOSIS — G47.33 OBSTRUCTIVE SLEEP APNEA: Primary | ICD-10-CM

## 2020-01-22 DIAGNOSIS — E66.09 CLASS 2 OBESITY DUE TO EXCESS CALORIES WITH BODY MASS INDEX (BMI) OF 36.0 TO 36.9 IN ADULT, UNSPECIFIED WHETHER SERIOUS COMORBIDITY PRESENT: ICD-10-CM

## 2020-01-22 DIAGNOSIS — E11.9 TYPE 2 DIABETES MELLITUS WITHOUT COMPLICATION, WITHOUT LONG-TERM CURRENT USE OF INSULIN (HCC): Primary | ICD-10-CM

## 2020-01-22 PROCEDURE — 99213 OFFICE O/P EST LOW 20 MIN: CPT | Performed by: PSYCHIATRY & NEUROLOGY

## 2020-01-22 RX ORDER — LANCETS
EACH MISCELLANEOUS
Qty: 50 EACH | Refills: 5 | Status: SHIPPED | OUTPATIENT
Start: 2020-01-22 | End: 2020-08-31 | Stop reason: SDUPTHER

## 2020-01-22 NOTE — PROGRESS NOTES
Sleep medicine follow-up visit    Thor Crouch   1960  59 y.o. male   DATE OF SERVICE: 1/22/2020     Chief complaint:  zakiya treated with cpap    On NPSG at [facility] , 10/12/2018 patient had Moderate obstructive sleep apnea syndrome with apnea-hypopnea index of 17.2 per sleep hour, minimum SpO2 of 82%    The compliance data reviewed and the patient is on CPAP therapy at 8-14 cm/H2O and compliance data indicates excellent compliance with 100% usage for more than 4 hours with an average usage of 8 hours 59 minutes. AHI down to 2.8 with CPAP therapy and mean CPAP pressure 9.1 cm of water.  The patient's hypersomnia also resolved with Olden Sleepiness Scale score of 3 with CPAP therapy.  The patient feels great and is benefiting from it and is compliant.     Yearly f/u for CPAP compliance, doing well with pap therapy. Pt. uses nasal pillows and goes through [facility] for supplies. Pt. states doing well and feels allot better.    History of stroke, unchanged pt. still taking current medication.  no new symptoms    Obesity bmi 36, pt is working on weight loss    Review of Systems   Constitutional: Negative for appetite change and fatigue.   HENT: Negative for sinus pressure and sinus pain.    Eyes: Negative for pain and itching.   Respiratory: Negative for cough and shortness of breath.    Gastrointestinal: Negative for constipation and diarrhea.   Endocrine: Negative for cold intolerance and heat intolerance.   Genitourinary: Negative for difficulty urinating and frequency.   Musculoskeletal: Negative for back pain and neck pain.   Allergic/Immunologic: Negative for environmental allergies and food allergies.   Neurological: Negative for dizziness, tremors, seizures, syncope, facial asymmetry, speech difficulty, weakness, light-headedness, numbness and headaches.   Psychiatric/Behavioral: Negative for agitation and confusion.     I reviewed and addressed ROS entered by MA.        The following portions of the patient's  history were reviewed and updated as appropriate: allergies, current medications, past family history, past medical history, past social history, past surgical history and problem list.      Family History   Problem Relation Age of Onset   • Arthritis Mother    • Heart disease Mother         ischemic   • Goiter Father    • Heart disease Father         ischemic   • Heart attack Father    • Diabetes Sister         diabetes mellitus   • Diabetes Brother         diabetes mellitus   • Diabetes Maternal Uncle         diabetes mellitus   • Diabetes Paternal Grandmother         diabetes mellitus   • Stroke Paternal Grandfather    • Kidney failure Other         Uncle   • Arthritis Other         grandmother       Past Medical History:   Diagnosis Date   • Annual physical exam     Coordination of care/ Annual Physical Examination; Impression: Medical record thoroughly reviewed and summarized in EMR, since last PE   • Bronchitis     Impression: Slight improvement; Will obtain a chest xray today and discuss results at next visit. . Will draw labs also today. Will hold on starting any more antibiotics. Patient has finished Doxy and Zpak. start Cheratussin Ac for cough.   • Cataract of both eyes     Other cataract of both eyes; Impression: Stable   • Diabetic nephropathy associated with type 2 diabetes mellitus (CMS/HCC)     Impression: Protein normal 06/18, will continue to repeat.   • Diarrhea in adult patient     Impression: Worsening; possibly due to metformin, therefore will hold on this until next appt.   • Elevated BUN     Impression: Will repeat with next labs, Labs done 6-16-18, read by me, reviewed with pt. BUN/Creatinine ratio was 25 up from 22   • Epiretinal membrane, left     Impression: Followed with Dr. Murcia   • Family history of coronary artery disease     Impression: -Father MI at age 74, Mother had a stent at 70. ---keep risk factors low.   • History of prostate cancer     Impression: Patient has a follow up  appt with Dr. Taylor NP today in [place], discussed with patient about transferring to Dr. Li or Michelle, so he can see them in the [place] office.   • Hyperlipidemia, mild     Impression: Worse: Total Chol 136 unchanged, HDL 35 down from 38, Trig 138 up from 123, LDL 73 down from 78. Encouraged to watch fatty intake, exercise more, and lose weight Advised to increase fruits, vegtables, and exercise.   • Hypertension, benign     Impression: Good control; Encouraged to watch salt, exercise more and lose weight   • Left atrial enlargement     Impression: Stable, will follow conservatively   • Malaise and fatigue     Impression: Stable and mild   • Malignant neoplasm of prostate (CMS/HCC)     Impression: Doing well. Followed with Dr. Washington   • Microcytic anemia     Impression: cbc with next labs   • Other acute recurrent sinusitis     Other acute sinusitis, recurrence not specified; Impression: Worsening; Advised to start Doxycycline and Tessalon Perles. If not improving, return to office for re-evaluation.   • Overweight     >25   • Renal insufficiency, mild     Impression: new dx for chart--worse creat up to 1.29 from 1.25   • Seasonal allergic rhinitis due to pollen     Impression: Doing well   • Situational stress     Impression: Stable   • Skin lesion     Unspecified Skin Lesion; Impression: Will obtain notes from [facility].   • Stroke with cerebral ischemia (CMS/HCC)     Impression: Doing well at this time. Will obtain records from Dr. Seipel, regarding medication Plavix.   • Type 2 diabetes mellitus with both eyes affected by mild nonproliferative retinopathy without macular edema, without long-term current use of insulin (CMS/HCC)     Impression: Uncertain control; Advised patient to start monitoring blood sugar at home since taking OTC cough/cold medications.   • Vitamin B12 deficiency     Impression: Patient was supposed to recieve a B12 injection, however patient left before it was  done. Patient was called to come back to get this injection.       Social History     Socioeconomic History   • Marital status:      Spouse name: Not on file   • Number of children: Not on file   • Years of education: Not on file   • Highest education level: Not on file   Tobacco Use   • Smoking status: Never Smoker   • Smokeless tobacco: Never Used   Substance and Sexual Activity   • Alcohol use: No     Frequency: Never   • Drug use: No   • Sexual activity: Defer         Current Outpatient Medications:   •  ACCU-CHEK FASTCLIX LANCETS misc, Daily., Disp: , Rfl:   •  cetirizine (zyrTEC) 10 MG tablet, Take 10 mg by mouth Daily., Disp: , Rfl:   •  clopidogrel (PLAVIX) 75 MG tablet, Take 1 tablet by mouth Daily., Disp: 90 tablet, Rfl: 3  •  Empagliflozin (JARDIANCE) 10 MG tablet, Take 10 mg by mouth Daily., Disp: 90 tablet, Rfl: 3  •  fluticasone (FLONASE) 50 MCG/ACT nasal spray, USE 2 SPRAYS IN EACH NOSTRIL ONCE DAILY, Disp: 16 mL, Rfl: 3  •  glucose blood (ACCU-CHEK GUIDE) test strip, 1 each by Other route Daily., Disp: 50 each, Rfl: 5  •  glyburide (DIAbeta) 5 MG tablet, Take 2 tablets by mouth 2 (Two) Times a Day., Disp: 120 tablet, Rfl: 3  •  lisinopril (PRINIVIL,ZESTRIL) 20 MG tablet, TAKE 1 TABLET BY MOUTH EVERY DAY, Disp: 90 tablet, Rfl: 0  •  lovastatin (MEVACOR) 40 MG tablet, Take 1 tablet by mouth Every Evening., Disp: 90 tablet, Rfl: 3  •  metoprolol succinate XL (TOPROL-XL) 50 MG 24 hr tablet, TAKE 1 TABLET BY MOUTH DAILY, Disp: 30 tablet, Rfl: 5  •  pioglitazone (ACTOS) 45 MG tablet, Take 1 tablet by mouth Daily., Disp: 30 tablet, Rfl: 5  •  SITagliptin-metFORMIN HCl ER (JANUMET XR) 100-1000 MG tablet, Take  by mouth Daily., Disp: , Rfl:     Allergies   Allergen Reactions   • Sulfa Antibiotics Dizziness        PHYSICAL EXAMINATION:  Vitals:    01/22/20 1601   BP: 159/79   Pulse: 77      Body mass index is 36.62 kg/m².       HEENT: Normal.      EXTREMITIES: No edema.     IMPRESSION:     Patient with  obstructive sleep apnea syndrome with hypersomnia successfully treated with CPAP therapy and is compliant and benefiting from it.     Obesity,    RECOMMENDATIONS:   1. Continue present CPAP.   2. Follow up 1 year.   3. Pt encouraged to lose weight.    EPWORTH SLEEPINESS SCALE  Sitting and reading  0  WatchingTV  3  Sitting, inactive, in a public place  0  As a passenger in a car for 1 hour w/o a break  0  Lying down to rest in the afternoon  0  Sitting and talking to someone  0  Sitting quietly after a lunch  0  In a car, while stopped for traffic or a light  0  Total 3          This document has been electronically signed by Joseph Seipel, MD on January 22, 2020 4:21 PM

## 2020-01-26 NOTE — ASSESSMENT & PLAN NOTE
Hypertension is unchanged.  Continue current treatment regimen.  Dietary sodium restriction.  Weight loss.  Regular aerobic exercise.  Continue current medications.  Blood pressure will be reassessed in 3 months.

## 2020-02-05 ENCOUNTER — OFFICE VISIT (OUTPATIENT)
Dept: FAMILY MEDICINE CLINIC | Facility: CLINIC | Age: 60
End: 2020-02-05

## 2020-02-05 VITALS
RESPIRATION RATE: 16 BRPM | DIASTOLIC BLOOD PRESSURE: 73 MMHG | SYSTOLIC BLOOD PRESSURE: 138 MMHG | TEMPERATURE: 98 F | WEIGHT: 253 LBS | HEART RATE: 81 BPM | HEIGHT: 70 IN | OXYGEN SATURATION: 97 % | BODY MASS INDEX: 36.22 KG/M2

## 2020-02-05 DIAGNOSIS — E08.649 DIABETES MELLITUS DUE TO UNDERLYING CONDITION, UNCONTROLLED, WITH HYPOGLYCEMIA WITHOUT COMA (HCC): Primary | ICD-10-CM

## 2020-02-05 PROCEDURE — 99212 OFFICE O/P EST SF 10 MIN: CPT | Performed by: FAMILY MEDICINE

## 2020-02-05 NOTE — ASSESSMENT & PLAN NOTE
Greatly improved; Reviewed home blood sugar monitoring and scanned to chart. Advised to continue monitoring at home.  Patient has stopped janumet and is currently taking Metformin 1000mg BID.

## 2020-02-05 NOTE — PROGRESS NOTES
Subjective   Thor Crouch is a 59 y.o. male.     Uncontrolled diabetes    Diabetes   He presents for his follow-up diabetic visit. He has type 2 diabetes mellitus. His disease course has been fluctuating. There are no hypoglycemic associated symptoms. There are no diabetic associated symptoms. Pertinent negatives for diabetes include no polyphagia and no polyuria. There are no hypoglycemic complications.        The following portions of the patient's history were reviewed and updated as appropriate: allergies, past family history, past medical history, past social history, past surgical history and problem list.    Family History   Problem Relation Age of Onset   • Arthritis Mother    • Heart disease Mother         ischemic   • Goiter Father    • Heart disease Father         ischemic   • Heart attack Father    • Diabetes Sister         diabetes mellitus   • Diabetes Brother         diabetes mellitus   • Diabetes Maternal Uncle         diabetes mellitus   • Diabetes Paternal Grandmother         diabetes mellitus   • Stroke Paternal Grandfather    • Kidney failure Other         Uncle   • Arthritis Other         grandmother       Social History     Tobacco Use   • Smoking status: Never Smoker   • Smokeless tobacco: Never Used   Substance Use Topics   • Alcohol use: No     Frequency: Never   • Drug use: No       History reviewed. No pertinent surgical history.    Patient Active Problem List   Diagnosis   • Adult general medical examination   • Cataract of both eyes   • Diabetic nephropathy associated with type 2 diabetes mellitus (CMS/HCC)   • Elevated BUN   • Epiretinal membrane, left   • Family history of ischemic heart disease   • History of prostate cancer   • History of stroke   • Hyperlipidemia   • Hypertension, benign   • Left atrial enlargement   • Obstructive sleep apnea   • Overweight   • Renal insufficiency, mild   • Seasonal allergic rhinitis due to pollen   • Situational stress   • Skin lesion   • Type 2  diabetes mellitus without complications (CMS/[facility])   • Type 2 diabetes mellitus with both eyes affected by mild nonproliferative retinopathy without macular edema, without long-term current use of insulin (CMS/[facility])   • Vitamin B12 deficiency   • BPH (benign prostatic hyperplasia)   • Renal failure   • Microcytic anemia   • Diabetes mellitus due to underlying condition, uncontrolled (CMS/[facility])   • Elevated liver function tests   • Hyperkalemia   • Diarrhea       Current Outpatient Medications on File Prior to Visit   Medication Sig Dispense Refill   • ACCU-CHEK FASTCLIX LANCETS misc TEST BLOOD SUGAR ONCE DAILY 50 each 5   • cetirizine (zyrTEC) 10 MG tablet Take 10 mg by mouth Daily.     • clopidogrel (PLAVIX) 75 MG tablet Take 1 tablet by mouth Daily. 90 tablet 3   • fluticasone (FLONASE) 50 MCG/ACT nasal spray USE 2 SPRAYS IN EACH NOSTRIL ONCE DAILY 16 mL 3   • glyburide (DIAbeta) 5 MG tablet Take 2 tablets by mouth 2 (Two) Times a Day. 120 tablet 3   • lisinopril (PRINIVIL,ZESTRIL) 20 MG tablet TAKE 1 TABLET BY MOUTH EVERY DAY 90 tablet 0   • lovastatin (MEVACOR) 40 MG tablet Take 1 tablet by mouth Every Evening. 90 tablet 3   • metoprolol succinate XL (TOPROL-XL) 50 MG 24 hr tablet TAKE 1 TABLET BY MOUTH DAILY 30 tablet 5   • pioglitazone (ACTOS) 45 MG tablet Take 1 tablet by mouth Daily. 30 tablet 5   • glucose blood (ACCU-CHEK GUIDE) test strip 1 each by Other route Daily. 50 each 5     No current facility-administered medications on file prior to visit.        Allergies   Allergen Reactions   • Sulfa Antibiotics Dizziness       Review of Systems   Eyes: Negative for visual disturbance.   Endocrine: Negative for polyphagia and polyuria.   Genitourinary: Negative for frequency.   Skin: Negative for skin lesions.   Neurological: Negative for syncope and numbness.       Objective   Visit Vitals  /73 (BP Location: Left arm, Patient Position: Sitting, Cuff Size: Large Adult)   Pulse 81   Temp 98 °F (36.7 °C)  "  Resp 16   Ht 177.8 cm (70\")   Wt 115 kg (253 lb)   SpO2 97%   BMI 36.30 kg/m²     Physical Exam   Constitutional: He is oriented to person, place, and time. He appears well-developed and well-nourished. He is cooperative.   HENT:   Head: Normocephalic.   Neck: Trachea normal. Neck supple. Carotid bruit is not present. No thyromegaly present.   Cardiovascular: Normal rate and regular rhythm. Exam reveals no gallop and no friction rub.   No murmur heard.  Pulmonary/Chest: Effort normal and breath sounds normal. No respiratory distress. He has no wheezes. He exhibits no tenderness.   Neurological: He is alert and oriented to person, place, and time.   Skin: Skin is dry. No rash noted. Nails show no clubbing.   Nursing note and vitals reviewed.        Assessment/Plan .  Problem List Items Addressed This Visit        Unprioritized    Diabetes mellitus due to underlying condition, uncontrolled (CMS/[facility]) - Primary    Current Assessment & Plan     Greatly improved; Reviewed home blood sugar monitoring and scanned to chart. Advised to continue monitoring at home.  Patient has stopped janumet and is currently taking Metformin 1000mg BID.                        "

## 2020-04-12 LAB
ALBUMIN SERPL-MCNC: 4.3 G/DL (ref 3.8–4.8)
ALBUMIN/GLOB SERPL: 1.7 {RATIO} (ref 1.2–2.2)
ALP SERPL-CCNC: 81 IU/L (ref 39–117)
ALT SERPL-CCNC: 24 IU/L (ref 0–44)
AST SERPL-CCNC: 17 IU/L (ref 0–40)
BILIRUB SERPL-MCNC: 0.2 MG/DL (ref 0–1.2)
BUN SERPL-MCNC: 31 MG/DL (ref 8–27)
BUN/CREAT SERPL: 23 (ref 10–24)
CALCIUM SERPL-MCNC: 9.7 MG/DL (ref 8.6–10.2)
CHLORIDE SERPL-SCNC: 104 MMOL/L (ref 96–106)
CHOLEST SERPL-MCNC: 139 MG/DL (ref 100–199)
CHOLEST/HDLC SERPL: 3.9 RATIO (ref 0–5)
CO2 SERPL-SCNC: 23 MMOL/L (ref 20–29)
CREAT SERPL-MCNC: 1.36 MG/DL (ref 0.76–1.27)
GLOBULIN SER CALC-MCNC: 2.5 G/DL (ref 1.5–4.5)
GLUCOSE SERPL-MCNC: 220 MG/DL (ref 65–99)
HBA1C MFR BLD: 8.8 % (ref 4.8–5.6)
HDLC SERPL-MCNC: 36 MG/DL
LDLC SERPL CALC-MCNC: 69 MG/DL (ref 0–99)
POTASSIUM SERPL-SCNC: 5 MMOL/L (ref 3.5–5.2)
PROT SERPL-MCNC: 6.8 G/DL (ref 6–8.5)
SODIUM SERPL-SCNC: 144 MMOL/L (ref 134–144)
TRIGL SERPL-MCNC: 169 MG/DL (ref 0–149)
VLDLC SERPL CALC-MCNC: 34 MG/DL (ref 5–40)

## 2020-04-21 ENCOUNTER — OFFICE VISIT (OUTPATIENT)
Dept: FAMILY MEDICINE CLINIC | Facility: CLINIC | Age: 60
End: 2020-04-21

## 2020-04-21 VITALS
DIASTOLIC BLOOD PRESSURE: 82 MMHG | HEART RATE: 78 BPM | TEMPERATURE: 97.7 F | OXYGEN SATURATION: 97 % | SYSTOLIC BLOOD PRESSURE: 140 MMHG | RESPIRATION RATE: 18 BRPM | HEIGHT: 68 IN | BODY MASS INDEX: 37.07 KG/M2 | WEIGHT: 244.6 LBS

## 2020-04-21 DIAGNOSIS — N28.9 RENAL INSUFFICIENCY, MILD: ICD-10-CM

## 2020-04-21 DIAGNOSIS — I51.7 LEFT ATRIAL ENLARGEMENT: ICD-10-CM

## 2020-04-21 DIAGNOSIS — E66.3 OVERWEIGHT: ICD-10-CM

## 2020-04-21 DIAGNOSIS — I10 HYPERTENSION, BENIGN: ICD-10-CM

## 2020-04-21 DIAGNOSIS — R79.89 ELEVATED LIVER FUNCTION TESTS: ICD-10-CM

## 2020-04-21 DIAGNOSIS — E08.649 DIABETES MELLITUS DUE TO UNDERLYING CONDITION, UNCONTROLLED, WITH HYPOGLYCEMIA WITHOUT COMA (HCC): Primary | ICD-10-CM

## 2020-04-21 DIAGNOSIS — E78.2 MIXED HYPERLIPIDEMIA: ICD-10-CM

## 2020-04-21 DIAGNOSIS — E87.5 HYPERKALEMIA: ICD-10-CM

## 2020-04-21 PROCEDURE — 99214 OFFICE O/P EST MOD 30 MIN: CPT | Performed by: FAMILY MEDICINE

## 2020-04-21 RX ORDER — CLOPIDOGREL BISULFATE 75 MG/1
75 TABLET ORAL DAILY
Qty: 90 TABLET | Refills: 1 | Status: SHIPPED | OUTPATIENT
Start: 2020-04-21 | End: 2020-07-22 | Stop reason: SDUPTHER

## 2020-04-21 RX ORDER — METOPROLOL SUCCINATE 50 MG/1
50 TABLET, EXTENDED RELEASE ORAL DAILY
Qty: 90 TABLET | Refills: 1 | Status: SHIPPED | OUTPATIENT
Start: 2020-04-21 | End: 2020-07-22 | Stop reason: SDUPTHER

## 2020-04-21 RX ORDER — FLUTICASONE PROPIONATE 50 MCG
2 SPRAY, SUSPENSION (ML) NASAL DAILY
Qty: 16 ML | Refills: 3 | Status: SHIPPED | OUTPATIENT
Start: 2020-04-21 | End: 2020-07-22 | Stop reason: SDUPTHER

## 2020-04-21 RX ORDER — GLYBURIDE 5 MG/1
10 TABLET ORAL 2 TIMES DAILY
Qty: 120 TABLET | Refills: 1 | Status: SHIPPED | OUTPATIENT
Start: 2020-04-21 | End: 2020-07-22 | Stop reason: SDUPTHER

## 2020-04-21 RX ORDER — LOVASTATIN 40 MG/1
40 TABLET ORAL EVERY EVENING
Qty: 90 TABLET | Refills: 1 | Status: SHIPPED | OUTPATIENT
Start: 2020-04-21 | End: 2020-07-22 | Stop reason: SDUPTHER

## 2020-04-21 RX ORDER — LISINOPRIL 20 MG/1
20 TABLET ORAL DAILY
Qty: 90 TABLET | Refills: 1 | Status: SHIPPED | OUTPATIENT
Start: 2020-04-21 | End: 2020-06-15 | Stop reason: SDUPTHER

## 2020-04-21 NOTE — ASSESSMENT & PLAN NOTE
Labs done 4-, read by me, reviewed with pt.  Trig. 169 up from 161, Tot. Chol. 139 up from 137, HDL 36 up from 34, LDL 69 down from 71.  Improved, not at goal.   Encouraged to watch fatty intake, exercise more, and lose weight. Compliant with medication   Is not getting adequate diet and exercise  Goals developed at last visit were not met because diet and exercise  Follow up in 3  months  Care management needs are self-addressed.  Self-management abilities addressed and patient is capable of managing his own disease.

## 2020-04-21 NOTE — PROGRESS NOTES
Subjective   Thor Crouch is a 59 y.o. male.     Hyperlipidemia   This is a chronic problem. The current episode started more than 1 year ago. The problem is controlled. Recent lipid tests were reviewed and are high. Exacerbating diseases include diabetes and obesity. Factors aggravating his hyperlipidemia include fatty foods. Pertinent negatives include no chest pain, myalgias or shortness of breath. Current antihyperlipidemic treatment includes statins. There are no compliance problems.  Risk factors for coronary artery disease include diabetes mellitus, dyslipidemia and hypertension.   Hypertension   This is a chronic problem. The current episode started more than 1 year ago. The problem is unchanged. The problem is controlled. Pertinent negatives include no chest pain, palpitations or shortness of breath. There are no associated agents to hypertension. Risk factors for coronary artery disease include dyslipidemia, diabetes mellitus and obesity. There are no compliance problems.    Diabetes   He presents for his follow-up diabetic visit. He has type 2 diabetes mellitus. No MedicAlert identification noted. His disease course has been improving. There are no hypoglycemic associated symptoms. There are no diabetic associated symptoms. Pertinent negatives for diabetes include no chest pain, no fatigue, no polyphagia, no polyuria and no weight loss. There are no hypoglycemic complications. Risk factors for coronary artery disease include diabetes mellitus, dyslipidemia, hypertension and obesity. Current diabetic treatment includes oral agent (dual therapy). He is compliant with treatment all of the time. When asked about meal planning, he reported none. He has not had a previous visit with a dietitian. He never participates in exercise. He does not see a podiatrist.Eye exam is not current.        The following portions of the patient's history were reviewed and updated as appropriate: allergies, current medications,  past family history, past medical history, past social history, past surgical history and problem list.    Family History   Problem Relation Age of Onset   • Arthritis Mother    • Heart disease Mother         ischemic   • Goiter Father    • Heart disease Father         ischemic   • Heart attack Father    • Diabetes Sister         diabetes mellitus   • Diabetes Brother         diabetes mellitus   • Diabetes Maternal Uncle         diabetes mellitus   • Diabetes Paternal Grandmother         diabetes mellitus   • Stroke Paternal Grandfather    • Kidney failure Other         Uncle   • Arthritis Other         grandmother       Social History     Tobacco Use   • Smoking status: Never Smoker   • Smokeless tobacco: Never Used   Substance Use Topics   • Alcohol use: No     Frequency: Never   • Drug use: No       History reviewed. No pertinent surgical history.    Patient Active Problem List   Diagnosis   • Adult general medical examination   • Cataract of both eyes   • Diabetic nephropathy associated with type 2 diabetes mellitus (CMS/HCC)   • Epiretinal membrane, left   • Family history of ischemic heart disease   • History of prostate cancer   • History of stroke   • Hyperlipidemia   • Hypertension, benign   • Left atrial enlargement   • Obstructive sleep apnea   • Overweight   • Renal insufficiency, mild   • Seasonal allergic rhinitis due to pollen   • Situational stress   • Skin lesion   • Type 2 diabetes mellitus with both eyes affected by mild nonproliferative retinopathy without macular edema, without long-term current use of insulin (CMS/HCC)   • Vitamin B12 deficiency   • BPH (benign prostatic hyperplasia)   • Microcytic anemia   • Diabetes mellitus due to underlying condition, uncontrolled (CMS/HCC)   • Elevated liver function tests   • Hyperkalemia   • Diarrhea       Current Outpatient Medications on File Prior to Visit   Medication Sig Dispense Refill   • ACCU-CHEK FASTCLIX LANCETS misc TEST BLOOD SUGAR ONCE DAILY  "50 each 5     No current facility-administered medications on file prior to visit.        Allergies   Allergen Reactions   • Sulfa Antibiotics Dizziness       Review of Systems   Constitutional: Negative for fatigue, unexpected weight gain and unexpected weight loss.   Eyes: Negative for visual disturbance.   Respiratory: Negative for shortness of breath.    Cardiovascular: Negative for chest pain, palpitations and leg swelling.   Gastrointestinal: Negative for nausea.   Endocrine: Negative for polyphagia and polyuria.   Genitourinary: Negative for frequency.   Musculoskeletal: Negative for myalgias.   Skin: Negative for dry skin and skin lesions.   Neurological: Negative for syncope, numbness and headache.       Objective   Visit Vitals  /82 (BP Location: Left arm, Patient Position: Sitting, Cuff Size: Large Adult)   Pulse 78   Temp 97.7 °F (36.5 °C) (Oral)   Resp 18   Ht 172.7 cm (68\")   Wt 111 kg (244 lb 9.6 oz)   SpO2 97%   BMI 37.19 kg/m²     Physical Exam   Constitutional: He is oriented to person, place, and time. He appears well-developed and well-nourished. He is cooperative.   HENT:   Head: Normocephalic.   Neck: Trachea normal. Neck supple. Carotid bruit is not present. No thyromegaly present.   Cardiovascular: Normal rate and regular rhythm. Exam reveals no gallop and no friction rub.   No murmur heard.  Pulmonary/Chest: Effort normal and breath sounds normal. No respiratory distress. He has no wheezes. He exhibits no tenderness.    Thor had a diabetic foot exam performed today.   During the foot exam he had a monofilament test performed (wnl).  Neurological: He is alert and oriented to person, place, and time.   Skin: Skin is dry. No rash noted. Nails show no clubbing.   Nursing note and vitals reviewed.        Assessment/Plan .  Problem List Items Addressed This Visit        Medium    Hyperlipidemia    Current Assessment & Plan     Labs done 4-, read by me, reviewed with pt.  Trig. 169 up " from 161, Tot. Chol. 139 up from 137, HDL 36 up from 34, LDL 69 down from 71.  Improved, not at goal.   Encouraged to watch fatty intake, exercise more, and lose weight. Compliant with medication   Is not getting adequate diet and exercise  Goals developed at last visit were not met because diet and exercise  Follow up in 3  months  Care management needs are self-addressed.  Self-management abilities addressed and patient is capable of managing his own disease.           Relevant Medications    lovastatin (MEVACOR) 40 MG tablet    Hypertension, benign    Current Assessment & Plan     Borderline poor control.    Encouraged to watch salt, exercise more and lose weight.           Relevant Medications    lisinopril (PRINIVIL,ZESTRIL) 20 MG tablet    metoprolol succinate XL (TOPROL-XL) 50 MG 24 hr tablet       Unprioritized    Diabetes mellitus due to underlying condition, uncontrolled (CMS/[facility]) - Primary    Current Assessment & Plan     Labs done 4-, read by me, reviewed with pt.  A1c was 8.8 down from 10.8  Improved.  Pt. Strongly encouraged to start exercising, pt. Declines to start exercising.  Encouraged to watch sugar intake, exercise more and lose weight. Compliant with medication.   Monitoring sugar at home.   Follow up in  3 months  Care management needs are self-addressed. Self-management abilities addressed and patient is capable of managing his own disease.           Relevant Medications    Empagliflozin (Jardiance) 10 MG tablet    glucose blood (Accu-Chek Guide) test strip    glyburide (DIAbeta) 5 MG tablet    metFORMIN (GLUCOPHAGE) 1000 MG tablet    Elevated liver function tests    Current Assessment & Plan     Improved.  Labs done 4-, read by me, reviewed with pt.  AST and ALT was normal         Hyperkalemia    Current Assessment & Plan     Resolved.  Labs done 4-, read by me, reviewed with pt.  Potassiuym was 5.0 down from 5.5         Left atrial enlargement    Relevant Medications     metoprolol succinate XL (TOPROL-XL) 50 MG 24 hr tablet    clopidogrel (Plavix) 75 MG tablet    Overweight    Current Assessment & Plan     Improved pt. losy 9 Lbs         Renal insufficiency, mild    Current Assessment & Plan     Worse.  Labs done 4-, read by me, reviewed with pt.  Creatinine was 1.36 up from 1.25, eGFR was 56 down from 62.  Pt. Advised to avoid anti-inflammatories and keep B/P in tight control.

## 2020-04-21 NOTE — ASSESSMENT & PLAN NOTE
Labs done 4-, read by me, reviewed with pt.  A1c was 8.8 down from 10.8  Improved.  Pt. Strongly encouraged to start exercising, pt. Declines to start exercising.  Encouraged to watch sugar intake, exercise more and lose weight. Compliant with medication.   Monitoring sugar at home.   Follow up in  3 months  Care management needs are self-addressed. Self-management abilities addressed and patient is capable of managing his own disease.

## 2020-04-21 NOTE — ASSESSMENT & PLAN NOTE
Worse.  Labs done 4-, read by me, reviewed with pt.  Creatinine was 1.36 up from 1.25, eGFR was 56 down from 62.  Pt. Advised to avoid anti-inflammatories and keep B/P in tight control.

## 2020-04-22 ENCOUNTER — TELEPHONE (OUTPATIENT)
Dept: FAMILY MEDICINE CLINIC | Facility: CLINIC | Age: 60
End: 2020-04-22

## 2020-06-15 DIAGNOSIS — I10 HYPERTENSION, BENIGN: ICD-10-CM

## 2020-06-15 RX ORDER — LISINOPRIL 20 MG/1
20 TABLET ORAL DAILY
Qty: 90 TABLET | Refills: 0 | Status: SHIPPED | OUTPATIENT
Start: 2020-06-15 | End: 2020-07-22 | Stop reason: SDUPTHER

## 2020-07-15 ENCOUNTER — OFFICE VISIT (OUTPATIENT)
Dept: FAMILY MEDICINE CLINIC | Facility: CLINIC | Age: 60
End: 2020-07-15

## 2020-07-15 VITALS
RESPIRATION RATE: 16 BRPM | OXYGEN SATURATION: 96 % | DIASTOLIC BLOOD PRESSURE: 75 MMHG | SYSTOLIC BLOOD PRESSURE: 153 MMHG | HEART RATE: 77 BPM | HEIGHT: 70 IN | TEMPERATURE: 97.6 F | WEIGHT: 236 LBS | BODY MASS INDEX: 33.79 KG/M2

## 2020-07-15 DIAGNOSIS — M79.10 MYALGIA: ICD-10-CM

## 2020-07-15 DIAGNOSIS — R35.1 NOCTURIA: ICD-10-CM

## 2020-07-15 DIAGNOSIS — I10 HYPERTENSION, BENIGN: ICD-10-CM

## 2020-07-15 DIAGNOSIS — E78.2 MIXED HYPERLIPIDEMIA: ICD-10-CM

## 2020-07-15 DIAGNOSIS — D50.9 MICROCYTIC ANEMIA: ICD-10-CM

## 2020-07-15 DIAGNOSIS — E53.8 VITAMIN B12 DEFICIENCY: ICD-10-CM

## 2020-07-15 DIAGNOSIS — E08.641 DIABETES MELLITUS DUE TO UNDERLYING CONDITION, UNCONTROLLED, WITH HYPOGLYCEMIA AND COMA (HCC): Primary | ICD-10-CM

## 2020-07-15 DIAGNOSIS — I63.9 STROKE WITH CEREBRAL ISCHEMIA (HCC): ICD-10-CM

## 2020-07-15 LAB
BILIRUB BLD-MCNC: NEGATIVE MG/DL
CLARITY, POC: CLEAR
COLOR UR: YELLOW
GLUCOSE UR STRIP-MCNC: ABNORMAL MG/DL
KETONES UR QL: NEGATIVE
LEUKOCYTE EST, POC: NEGATIVE
NITRITE UR-MCNC: NEGATIVE MG/ML
PH UR: 6 [PH] (ref 5–8)
POC MICROALBUMIN URINE: 0
PROT UR STRIP-MCNC: NEGATIVE MG/DL
RBC # UR STRIP: NEGATIVE /UL
SP GR UR: 1.01 (ref 1–1.03)
UROBILINOGEN UR QL: NORMAL

## 2020-07-15 PROCEDURE — 99214 OFFICE O/P EST MOD 30 MIN: CPT | Performed by: FAMILY MEDICINE

## 2020-07-15 PROCEDURE — 82044 UR ALBUMIN SEMIQUANTITATIVE: CPT | Performed by: FAMILY MEDICINE

## 2020-07-15 PROCEDURE — 81002 URINALYSIS NONAUTO W/O SCOPE: CPT | Performed by: FAMILY MEDICINE

## 2020-07-15 NOTE — ASSESSMENT & PLAN NOTE
Will draw labs today and discuss results at next visit. Monofilament and urine microalbum done today and were normal.  Eye exam by wilfrid

## 2020-07-15 NOTE — ASSESSMENT & PLAN NOTE
Poor control; Encouraged to watch salt, exercise more and lose weight.  If no improvement on follow-up will need to add medication

## 2020-07-15 NOTE — PROGRESS NOTES
Subjective   Thor Crouch is a 59 y.o. male.     In for follow-up on history of stroke is doing well has no further signs or symptoms.    Diabetes   He presents for his follow-up diabetic visit. He has type 2 diabetes mellitus. His disease course has been fluctuating. There are no hypoglycemic associated symptoms. There are no diabetic associated symptoms. Pertinent negatives for diabetes include no chest pain and no fatigue. There are no hypoglycemic complications. Symptoms are stable. Risk factors for coronary artery disease include dyslipidemia, diabetes mellitus and hypertension. Current diabetic treatment includes oral agent (dual therapy).   Hypertension   This is a chronic problem. The current episode started more than 1 year ago. The problem has been gradually worsening since onset. The problem is uncontrolled. Pertinent negatives include no chest pain, palpitations or shortness of breath. Risk factors for coronary artery disease include dyslipidemia and diabetes mellitus. Current antihypertension treatment includes beta blockers.   Urinary Frequency    This is a recurrent problem. The problem occurs intermittently. The problem has been gradually worsening. Pertinent negatives include no frequency (nocturia).        The following portions of the patient's history were reviewed and updated as appropriate: current medications, past family history, past medical history, past social history, past surgical history and problem list.    Family History   Problem Relation Age of Onset   • Arthritis Mother    • Heart disease Mother         ischemic   • Goiter Father    • Heart disease Father         ischemic   • Heart attack Father    • Diabetes Sister         diabetes mellitus   • Diabetes Brother         diabetes mellitus   • Diabetes Maternal Uncle         diabetes mellitus   • Diabetes Paternal Grandmother         diabetes mellitus   • Stroke Paternal Grandfather    • Kidney failure Other         Uncle   • Arthritis  Other         grandmother       Social History     Tobacco Use   • Smoking status: Never Smoker   • Smokeless tobacco: Never Used   Substance Use Topics   • Alcohol use: No     Frequency: Never   • Drug use: No       No past surgical history on file.    Patient Active Problem List   Diagnosis   • Adult general medical examination   • Cataract of both eyes   • Diabetic nephropathy associated with type 2 diabetes mellitus (CMS/HCC)   • Epiretinal membrane, left   • Family history of ischemic heart disease   • History of prostate cancer   • History of stroke   • Hyperlipidemia   • Hypertension, benign   • Left atrial enlargement   • Obstructive sleep apnea   • Overweight   • Renal insufficiency, mild   • Seasonal allergic rhinitis due to pollen   • Situational stress   • Skin lesion   • Type 2 diabetes mellitus with both eyes affected by mild nonproliferative retinopathy without macular edema, without long-term current use of insulin (CMS/HCC)   • Vitamin B12 deficiency   • BPH (benign prostatic hyperplasia)   • Microcytic anemia   • Diabetes mellitus due to underlying condition, uncontrolled (CMS/HCC)   • Elevated liver function tests   • Hyperkalemia   • Diarrhea   • Myalgia   • Nocturia       Current Outpatient Medications on File Prior to Visit   Medication Sig Dispense Refill   • ACCU-CHEK FASTCLIX LANCETS misc TEST BLOOD SUGAR ONCE DAILY 50 each 5   • clopidogrel (Plavix) 75 MG tablet Take 1 tablet by mouth Daily. 90 tablet 1   • Empagliflozin (Jardiance) 10 MG tablet Take 10 mg by mouth Daily. 90 tablet 1   • fluticasone (FLONASE) 50 MCG/ACT nasal spray 2 sprays by Each Nare route Daily. 16 mL 3   • glucose blood (Accu-Chek Guide) test strip 1 each by Other route Daily. 100 each 5   • glyburide (DIAbeta) 5 MG tablet Take 2 tablets by mouth 2 (Two) Times a Day. 120 tablet 1   • lisinopril (PRINIVIL,ZESTRIL) 20 MG tablet Take 1 tablet by mouth Daily. 90 tablet 0   • lovastatin (MEVACOR) 40 MG tablet Take 1 tablet  "by mouth Every Evening. 90 tablet 1   • metFORMIN (GLUCOPHAGE) 1000 MG tablet Take 1 tablet by mouth 2 (Two) Times a Day. 180 tablet 1   • metoprolol succinate XL (TOPROL-XL) 50 MG 24 hr tablet Take 1 tablet by mouth Daily. 90 tablet 1     No current facility-administered medications on file prior to visit.        Allergies   Allergen Reactions   • Sulfa Antibiotics Dizziness       Review of Systems   Constitutional: Negative for fatigue.   HENT: Negative for hearing loss.    Respiratory: Negative for shortness of breath.    Cardiovascular: Negative for chest pain and palpitations.   Genitourinary: Negative for frequency (nocturia).        Nocturia   Musculoskeletal: Negative for joint swelling.   Neurological: Negative for memory problem.       Objective   Visit Vitals  /75 (BP Location: Left arm, Patient Position: Sitting, Cuff Size: Large Adult)   Pulse 77   Temp 97.6 °F (36.4 °C)   Resp 16   Ht 177.8 cm (70\")   Wt 107 kg (236 lb)   SpO2 96%   BMI 33.86 kg/m²     Physical Exam   Constitutional: He is oriented to person, place, and time. He appears well-developed and well-nourished. He is cooperative.   HENT:   Head: Normocephalic.   Neck: Trachea normal. Neck supple. Carotid bruit is not present. No thyromegaly present.   Cardiovascular: Normal rate and regular rhythm. Exam reveals no gallop and no friction rub.   No murmur heard.  Pulmonary/Chest: Effort normal and breath sounds normal. No respiratory distress. He has no wheezes. He exhibits no tenderness.   Neurological: He is alert and oriented to person, place, and time.   Skin: Skin is dry. No rash noted. Nails show no clubbing.   Nursing note and vitals reviewed.        Assessment/Plan .  Problem List Items Addressed This Visit        Medium    Diabetes mellitus due to underlying condition, uncontrolled (CMS/[facility])    Current Assessment & Plan     Will draw labs today and discuss results at next visit. Monofilament done today and was normal         " Relevant Orders    Hemoglobin A1c    POC Microalbumin (Completed)    Hyperlipidemia - Primary    Current Assessment & Plan     Will draw labs today and discuss results at next visit           Relevant Orders    Lipid Panel With / Chol / HDL Ratio    Comprehensive Metabolic Panel    Hypertension, benign    Current Assessment & Plan     Poor control; Encouraged to watch salt, exercise more and lose weight.           Microcytic anemia    Current Assessment & Plan     Will draw labs today and discuss results at next visit         Relevant Orders    CBC & Differential       Low    Vitamin B12 deficiency    Overview                Current Assessment & Plan     Will draw labs today and discuss results at next visit         Relevant Orders    Vitamin B12    Methylmalonic Acid, Serum       Unprioritized    Myalgia    Current Assessment & Plan     Will draw labs today and discuss results at next visit         Relevant Orders    CK    Nocturia    Current Assessment & Plan     Mild- average of 4 nights a week. UA done today shows glu-2000 otherwise normal.         Relevant Orders    POCT urinalysis dipstick, manual (Completed)

## 2020-07-15 NOTE — ASSESSMENT & PLAN NOTE
Mild- average of 4 nights a week. UA done today shows glu-2000 otherwise normal.  Declines more evaluation or treatment

## 2020-07-16 PROBLEM — I63.9 STROKE WITH CEREBRAL ISCHEMIA: Status: ACTIVE | Noted: 2020-07-16

## 2020-07-16 PROBLEM — C61 MALIGNANT NEOPLASM OF PROSTATE (HCC): Status: ACTIVE | Noted: 2020-07-16

## 2020-07-19 LAB
ALBUMIN SERPL-MCNC: 4.4 G/DL (ref 3.8–4.8)
ALBUMIN/GLOB SERPL: 1.8 {RATIO} (ref 1.2–2.2)
ALP SERPL-CCNC: 91 IU/L (ref 39–117)
ALT SERPL-CCNC: 25 IU/L (ref 0–44)
AST SERPL-CCNC: 17 IU/L (ref 0–40)
BASOPHILS # BLD AUTO: 0.1 X10E3/UL (ref 0–0.2)
BASOPHILS NFR BLD AUTO: 1 %
BILIRUB SERPL-MCNC: 0.2 MG/DL (ref 0–1.2)
BUN SERPL-MCNC: 30 MG/DL (ref 8–27)
BUN/CREAT SERPL: 22 (ref 10–24)
CALCIUM SERPL-MCNC: 9.6 MG/DL (ref 8.6–10.2)
CHLORIDE SERPL-SCNC: 102 MMOL/L (ref 96–106)
CHOLEST SERPL-MCNC: 154 MG/DL (ref 100–199)
CHOLEST/HDLC SERPL: 4.5 RATIO (ref 0–5)
CK SERPL-CCNC: 53 U/L (ref 41–331)
CO2 SERPL-SCNC: 23 MMOL/L (ref 20–29)
CREAT SERPL-MCNC: 1.39 MG/DL (ref 0.76–1.27)
EOSINOPHIL # BLD AUTO: 0.3 X10E3/UL (ref 0–0.4)
EOSINOPHIL NFR BLD AUTO: 5 %
ERYTHROCYTE [DISTWIDTH] IN BLOOD BY AUTOMATED COUNT: 13.6 % (ref 11.6–15.4)
GLOBULIN SER CALC-MCNC: 2.5 G/DL (ref 1.5–4.5)
GLUCOSE SERPL-MCNC: 300 MG/DL (ref 65–99)
HBA1C MFR BLD: 11.1 % (ref 4.8–5.6)
HCT VFR BLD AUTO: 47.4 % (ref 37.5–51)
HDLC SERPL-MCNC: 34 MG/DL
HGB BLD-MCNC: 14.8 G/DL (ref 13–17.7)
IMM GRANULOCYTES # BLD AUTO: 0 X10E3/UL (ref 0–0.1)
IMM GRANULOCYTES NFR BLD AUTO: 0 %
LDLC SERPL CALC-MCNC: 74 MG/DL (ref 0–99)
LYMPHOCYTES # BLD AUTO: 1.2 X10E3/UL (ref 0.7–3.1)
LYMPHOCYTES NFR BLD AUTO: 18 %
Lab: NORMAL
MCH RBC QN AUTO: 27.1 PG (ref 26.6–33)
MCHC RBC AUTO-ENTMCNC: 31.2 G/DL (ref 31.5–35.7)
MCV RBC AUTO: 87 FL (ref 79–97)
METHYLMALONATE SERPL-SCNC: 280 NMOL/L (ref 0–378)
MONOCYTES # BLD AUTO: 0.6 X10E3/UL (ref 0.1–0.9)
MONOCYTES NFR BLD AUTO: 9 %
NEUTROPHILS # BLD AUTO: 4.4 X10E3/UL (ref 1.4–7)
NEUTROPHILS NFR BLD AUTO: 67 %
PLATELET # BLD AUTO: 377 X10E3/UL (ref 150–450)
POTASSIUM SERPL-SCNC: 5 MMOL/L (ref 3.5–5.2)
PROT SERPL-MCNC: 6.9 G/DL (ref 6–8.5)
RBC # BLD AUTO: 5.46 X10E6/UL (ref 4.14–5.8)
SODIUM SERPL-SCNC: 138 MMOL/L (ref 134–144)
TRIGL SERPL-MCNC: 228 MG/DL (ref 0–149)
VIT B12 SERPL-MCNC: 206 PG/ML (ref 232–1245)
VLDLC SERPL CALC-MCNC: 46 MG/DL (ref 5–40)
WBC # BLD AUTO: 6.6 X10E3/UL (ref 3.4–10.8)

## 2020-07-21 NOTE — PROGRESS NOTES
Subjective   Thor Crouch is a 59 y.o. male here for his annual physical with me. Thor is here for coordination of medical care, to discuss health maintenance, disease prevention as well as to followup on medical problems. Patient has been followed by me since 2003. Patient's last CPE was 7-1-2019. Activity level is minimal. Exercises 0 per week. Appetite is good. Feels fairly well with none complaints. Energy level is good. Sleeps well. Patient's last colonoscopy was never. He is advised to repeat in this year. Patient is doing routine self skin exam monthly. Patient is doing routine self-breast exams monthly. Patient is checking testicles monthly.    No results found for: PSA     Diabetes   He presents for his follow-up diabetic visit. He has type 2 diabetes mellitus. His disease course has been worsening. There are no hypoglycemic associated symptoms. Pertinent negatives for hypoglycemia include no dizziness, nervousness/anxiousness or speech difficulty. Pertinent negatives for diabetes include no blurred vision, no chest pain, no fatigue, no polydipsia, no polyphagia, no polyuria, no weakness and no weight loss. There are no hypoglycemic complications. Diabetic complications include a CVA. Risk factors for coronary artery disease include dyslipidemia, diabetes mellitus, obesity and hypertension. Current diabetic treatment includes oral agent (triple therapy). He is compliant with treatment all of the time. His weight is increasing steadily. When asked about meal planning, he reported none. He has not had a previous visit with a dietitian. He never participates in exercise. His home blood glucose trend is increasing steadily. He does not see a podiatrist.Eye exam is not current.   Cerebrovascular Accident   This is a chronic problem. The current episode started more than 1 year ago. The problem has been resolved. Pertinent negatives include no abdominal pain, chest pain, chills, congestion, coughing, fatigue,  fever, joint swelling, myalgias, nausea, neck pain, rash, sore throat, vomiting or weakness. Nothing aggravates the symptoms. He has tried nothing for the symptoms.   Prostate Cancer   This is a chronic problem. The current episode started more than 1 year ago. The problem has been resolved. Pertinent negatives include no abdominal pain, chest pain, chills, congestion, coughing, fatigue, fever, joint swelling, myalgias, nausea, neck pain, rash, sore throat, vomiting or weakness. The treatment provided significant relief.   Abnormal Lab   This is a chronic problem. The current episode started more than 1 year ago. The problem occurs intermittently. Pertinent negatives include no abdominal pain, chest pain, chills, congestion, coughing, fatigue, fever, joint swelling, myalgias, nausea, neck pain, rash, sore throat, vomiting or weakness.   Hyperlipidemia   This is a chronic problem. The current episode started more than 1 year ago. The problem is controlled. Recent lipid tests were reviewed and are high. Exacerbating diseases include diabetes and obesity. Factors aggravating his hyperlipidemia include fatty foods. Pertinent negatives include no chest pain, myalgias or shortness of breath. Current antihyperlipidemic treatment includes statins. The current treatment provides no improvement of lipids. There are no compliance problems.  Risk factors for coronary artery disease include diabetes mellitus, dyslipidemia, hypertension and obesity.        The following portions of the patient's history were reviewed and updated as appropriate: allergies, current medications, past family history, past medical history, past social history, past surgical history and problem list.    Past Medical History:   Diagnosis Date   • Cataract of both eyes     Other cataract of both eyes; Impression: Stable   • Diabetic nephropathy associated with type 2 diabetes mellitus (CMS/[facility])     Impression: Protein normal 06/18, will continue to  repeat.   • Epiretinal membrane, left     Impression: Followed with Dr. Mucria   • History of prostate cancer     Impression: Patient has a follow up appt with Dr. Taylor NP today in [place], discussed with patient about transferring to Dr. Li or Michelle, so he can see them in the [place] office.   • Hypertension, benign     Impression: Good control; Encouraged to watch salt, exercise more and lose weight   • Left atrial enlargement     Impression: Stable, will follow conservatively   • Malignant neoplasm of prostate (CMS/HCC)     Impression: Doing well. Followed with Dr. Washington   • Microcytic anemia     Impression: cbc with next labs   • Overweight     >25   • Renal insufficiency, mild     Impression: new dx for chart--worse creat up to 1.29 from 1.25   • Seasonal allergic rhinitis due to pollen     Impression: Doing well   • Situational stress     Impression: Stable   • Skin lesion     Unspecified Skin Lesion; Impression: Will obtain notes from [facility].   • Stroke with cerebral ischemia (CMS/HCC)     Impression: Doing well at this time. Will obtain records from Dr. Seipel, regarding medication Plavix.   • Type 2 diabetes mellitus with both eyes affected by mild nonproliferative retinopathy without macular edema, without long-term current use of insulin (CMS/HCC)     Impression: Uncertain control; Advised patient to start monitoring blood sugar at home since taking OTC cough/cold medications.   • Vitamin B12 deficiency     Impression: Patient was supposed to recieve a B12 injection, however patient left before it was done. Patient was called to come back to get this injection.       Family History   Problem Relation Age of Onset   • Arthritis Mother    • Heart disease Mother         ischemic   • Goiter Father    • Heart disease Father         ischemic   • Heart attack Father    • Diabetes Sister         diabetes mellitus   • Diabetes Brother         diabetes mellitus   • Diabetes Maternal  Uncle         diabetes mellitus   • Diabetes Paternal Grandmother         diabetes mellitus   • Stroke Paternal Grandfather    • Kidney failure Other         Uncle   • Arthritis Other         grandmother       Social History     Socioeconomic History   • Marital status:      Spouse name: Not on file   • Number of children: Not on file   • Years of education: Not on file   • Highest education level: Not on file   Tobacco Use   • Smoking status: Never Smoker   • Smokeless tobacco: Never Used   Substance and Sexual Activity   • Alcohol use: No     Frequency: Never   • Drug use: No   • Sexual activity: Defer   Social History Narrative     1 x in 1979.  No children at home.  Works at [employer] in [place] has been there since 1977, mild stress.  42.5 hours weekly.  Caffeine none.  Always wears seatbelts, No exercise.  Hobbies none.       Past Surgical History:   Procedure Laterality Date   • PROSTATE SURGERY         Current Outpatient Medications on File Prior to Visit   Medication Sig Dispense Refill   • ACCU-CHEK FASTCLIX LANCETS misc TEST BLOOD SUGAR ONCE DAILY 50 each 5     No current facility-administered medications on file prior to visit.        Allergies   Allergen Reactions   • Sulfa Antibiotics Dizziness       Review of Systems   Constitutional: Negative for appetite change, chills, fatigue, fever, unexpected weight gain and unexpected weight loss.   HENT: Negative for congestion, dental problem, ear discharge, ear pain, hearing loss, nosebleeds, postnasal drip, rhinorrhea, sinus pressure, sneezing, sore throat, tinnitus and voice change.         Last Dental exam 7-2013 with Guilherme-advised to follow   Eyes: Negative for blurred vision, double vision, pain, redness and visual disturbance.        Last vision exam 2-2019 Dr. Murcia-advised to follow   Respiratory: Negative for cough, shortness of breath, wheezing and stridor.    Cardiovascular: Negative for chest pain, palpitations and leg swelling.  "  Gastrointestinal: Negative for abdominal pain, anal bleeding, blood in stool, constipation, diarrhea, nausea, rectal pain, vomiting, GERD and indigestion.        7 BM weekly   Endocrine: Negative for cold intolerance, heat intolerance, polydipsia, polyphagia and polyuria.        Sex Drive  He is a 1  She is a ?   Genitourinary: Positive for erectile dysfunction (Severe--successful 0 out of 10.  Patient has tried pills and Trymex without success he has decided to accept this especially with his wife's low sex drive). Negative for difficulty urinating, dysuria, frequency, hematuria and urgency.   Musculoskeletal: Negative for back pain, joint swelling, myalgias, neck pain and neck stiffness.   Skin: Negative for color change, dry skin and rash.   Neurological: Negative for dizziness, syncope, speech difficulty, weakness, light-headedness, headache and memory problem.   Hematological: Does not bruise/bleed easily.   Psychiatric/Behavioral: Negative for decreased concentration, sleep disturbance, depressed mood and stress. The patient is not nervous/anxious.        Objective   Visit Vitals  /78 (BP Location: Left arm, Patient Position: Sitting, Cuff Size: Large Adult)   Pulse 78   Temp 97.5 °F (36.4 °C) (Oral)   Resp 18   Ht 177.8 cm (70\")   Wt 108 kg (237 lb 12.8 oz)   SpO2 97%   BMI 34.12 kg/m²     Physical Exam   Constitutional: He is oriented to person, place, and time. He appears well-developed and well-nourished. No distress.   HENT:   Head: Normocephalic.   Right Ear: Hearing, tympanic membrane and external ear normal. cerumen impaction (moderate) is present.  Left Ear: Hearing, tympanic membrane, external ear and ear canal normal.   Nose: Nose normal.   Mouth/Throat: Oropharynx is clear and moist and mucous membranes are normal. No oropharyngeal exudate.   Eyes: Pupils are equal, round, and reactive to light. Conjunctivae, EOM and lids are normal. Right eye exhibits no discharge. Left eye exhibits no " discharge. No scleral icterus.   Neck: Trachea normal, normal range of motion and full passive range of motion without pain. Neck supple.   Cardiovascular: Normal rate, regular rhythm, normal heart sounds and intact distal pulses.   No murmur heard.  Pulses:       Dorsalis pedis pulses are 0 on the right side, and 0 on the left side.        Posterior tibial pulses are 0 on the right side, and 1+ on the left side.   Pulmonary/Chest: Effort normal and breath sounds normal. He has no wheezes. He exhibits no tenderness.   Abdominal: Soft. Bowel sounds are normal. He exhibits no distension and no mass. There is no tenderness. No hernia.   Genitourinary: Testes normal and penis normal. Circumcised. No penile tenderness.   Musculoskeletal: Normal range of motion. He exhibits no edema, tenderness or deformity.   Lymphadenopathy:     He has no cervical adenopathy.   Neurological: He is alert and oriented to person, place, and time. He has normal strength. He displays normal reflexes. No cranial nerve deficit or sensory deficit. He exhibits normal muscle tone. He displays a negative Romberg sign. Coordination normal.   Skin: Skin is warm and dry. No rash noted. He is not diaphoretic. No erythema.   Blue nevus of the right  lateral thigh.  Brown circular lesion of the upper abdomin.  Onychomycosis moderate of the left Great nail and moderate of the right 3rd nail.  Actinic keratosis verses squamous cell ca scattered over the back.  Skin tag right axillae   Psychiatric: He has a normal mood and affect. His behavior is normal. Judgment and thought content normal.       Assessment/Plan   Problem List Items Addressed This Visit        High    Malignant neoplasm of prostate (CMS/HCC)    Overview      Followed with Dr. Washington         Current Assessment & Plan      Followed with Dr. Washington         Stroke with cerebral ischemia (CMS/HCC)    Current Assessment & Plan     Doing well            Medium    Diabetic nephropathy  associated with type 2 diabetes mellitus (CMS/[facility])    Current Assessment & Plan     Doing well.  No symptoms at the present         Relevant Medications    insulin NPH (NovoLIN N) 100 UNIT/ML injection    Empagliflozin (Jardiance) 10 MG tablet    glyburide (DIAbeta) 5 MG tablet    metFORMIN (GLUCOPHAGE) 1000 MG tablet    Hyperlipidemia    Current Assessment & Plan     Labs done 7-, read by me, reviewed with pt.  Trig. 228 up from 169, Tot. Chol. 154 up from 139, HDL 34 down from 36, LDL 74 up from 69.  Worsening.  Discussed increasing medication.  Pt. Declines at present.  Encouraged to watch fatty intake, exercise more, and lose weight.   Compliant with medication   Is not getting adequate diet and exercise  Goals developed at last visit were not met because diet and exercise.  Follow up in 3  months  Care management needs are self-addressed.  Self-management abilities addressed and patient is capable of managing his own disease.           Relevant Medications    lovastatin (MEVACOR) 40 MG tablet    Other Relevant Orders    Lipid Panel With / Chol / HDL Ratio    Comprehensive metabolic panel    Hypertension, benign    Current Assessment & Plan     Borderline poor control  Encourged to watch sugar intake, exercise more, lose weight,            Relevant Medications    lisinopril (PRINIVIL,ZESTRIL) 20 MG tablet    metoprolol succinate XL (TOPROL-XL) 50 MG 24 hr tablet    Microcytic anemia    Current Assessment & Plan     Anemia stable but microcytic indices are worse.  Labs done 7-, read by me, reviewed with pt.  CBC showed HGB was 14.8 up from 14.4, Hematocrit was 47.4 up from 47.1.  MCHC was 31.2 up from 30.6.  B12 injection given today.         Relevant Orders    CBC & Differential    Renal insufficiency, mild    Current Assessment & Plan     Worse.  Labs done 7-, read by me, reviewed with pt.  Creatinine was 1.39 up from 1.36, EGFR was 54 down from 56.  Avoid anti-inflammatories and keep  "diabetes and tight control         Type 2 diabetes mellitus with both eyes affected by mild nonproliferative retinopathy without macular edema, without long-term current use of insulin (CMS/HCC)    Current Assessment & Plan     Pt. Will follow with Dr. Manzo         Relevant Medications    insulin NPH (NovoLIN N) 100 UNIT/ML injection    Empagliflozin (Jardiance) 10 MG tablet    glyburide (DIAbeta) 5 MG tablet    metFORMIN (GLUCOPHAGE) 1000 MG tablet    Uncontrolled diabetes mellitus (CMS/HCC) - Primary    Current Assessment & Plan     Labs done 7-, read by me, reviewed with pt.  A1c was 11.1 up from 8.8  Microalbumin was 0.done 7-, read by me, reviewed with pt.   Worsening.   Will start pt. On Insulin 10 units at HS and increase by 5 units nightly until follow up in week.  Goal is to stop all oral medications except for possibly metformin.  He will continue all oral medications though until follow-up he is worried about hypoglycemia we spent some time discussing his.  Encouraged to watch sugar intake, exercise more and lose weight.   Compliant with medication.   Not monitoring sugar at home.-Patient strong encouraged to check blood sugars 3-4 times a day and write these down and bring them in for me to review next week  Follow up in 1 week.  Care management needs are self-addressed.  Self-management abilities addressed and patient is capable of managing his own disease.           Relevant Medications    insulin NPH (NovoLIN N) 100 UNIT/ML injection    Insulin Syringe 30G X 1/2\" 0.5 ML misc    Empagliflozin (Jardiance) 10 MG tablet    glucose blood (Accu-Chek Guide) test strip    glyburide (DIAbeta) 5 MG tablet    metFORMIN (GLUCOPHAGE) 1000 MG tablet    Other Relevant Orders    Hemoglobin A1c       Low    RESOLVED: BPH (benign prostatic hyperplasia)    Overview     Stable followed with Dr. Washington         Myalgia    Current Assessment & Plan     Right calf, sounds like muscle spasms, benefits of " statin outweigh the risks.  Labs done 7-, read by me, reviewed with pt.  CPK was 53         Obstructive sleep apnea    Current Assessment & Plan     Doing well with C-pap         Seasonal allergic rhinitis due to pollen    Current Assessment & Plan     Doing well         Relevant Medications    fluticasone (FLONASE) 50 MCG/ACT nasal spray    Vitamin B12 deficiency    Overview                Current Assessment & Plan     Stable.  Labs done 7-, read by me, reviewed with pt.  Vit. B12 was 206 down from 209, MMA was 280.  Pt. Given B12 injection today.         Relevant Medications    cyanocobalamin injection 1,000 mcg    Other Relevant Orders    Vitamin B12       Unprioritized    Adult general medical examination    Overview     Medical record thoroughly reviewed and summarized in EMR, since last PE         Current Assessment & Plan     Encouraged to do self-breast exam, self-testicle exams, and self derm exams. Congratulated on using seat belts.  Encouraged to do annual physical exams.  Immunization status reviewed.           Cataract of both eyes    Current Assessment & Plan     Followed with Dr. Murcia         Elevated BUN    Current Assessment & Plan     Improved         Elevated liver function tests    Current Assessment & Plan     Doing well.  Labs done 7-, read by me, reviewed with pt.  AST and ALT was normal         Epiretinal membrane, left    Overview     Followed with Dr. Murcia         Current Assessment & Plan     Followed with Dr. Murcia         Family history of ischemic heart disease    Overview     -Father MI at age 74, Mother had a stent at 70.  ---keep risk factors low.         Current Assessment & Plan     -keep risk factors low.         History of prostate cancer    Current Assessment & Plan     Doing well, followed with Dr. Wahsington         Hyperkalemia    Current Assessment & Plan     Doing well.  Labs done 7-, read by me, reviewed with pt.  Potassium was 5.0 same as  last         Left atrial enlargement    Current Assessment & Plan     Stable         Relevant Medications    clopidogrel (Plavix) 75 MG tablet    metoprolol succinate XL (TOPROL-XL) 50 MG 24 hr tablet    Neoplasm, uncertain whether benign or malignant    Current Assessment & Plan     New dx.  Scattered over the back.  Advised excision or referral to dermatologist.  Pt. Declines and prefers to follow in 3 months         Overweight    Current Assessment & Plan     Improved, pt. Lost 28 Lbs         Situational stress    Overview     Greatly improved         Current Assessment & Plan     Greatly Improved                    Encouraged to check his skin, testicles and breasts monthly. Reviewed exercising regularly, eating a balanced diet, immunizations and if due, patient counselled to check with insurance company for coverage; and regularly checking skin and breasts.

## 2020-07-22 ENCOUNTER — OFFICE VISIT (OUTPATIENT)
Dept: FAMILY MEDICINE CLINIC | Facility: CLINIC | Age: 60
End: 2020-07-22

## 2020-07-22 VITALS
HEART RATE: 78 BPM | OXYGEN SATURATION: 97 % | WEIGHT: 237.8 LBS | DIASTOLIC BLOOD PRESSURE: 78 MMHG | RESPIRATION RATE: 18 BRPM | TEMPERATURE: 97.5 F | BODY MASS INDEX: 34.04 KG/M2 | HEIGHT: 70 IN | SYSTOLIC BLOOD PRESSURE: 132 MMHG

## 2020-07-22 DIAGNOSIS — G47.33 OBSTRUCTIVE SLEEP APNEA: ICD-10-CM

## 2020-07-22 DIAGNOSIS — E11.3293 TYPE 2 DIABETES MELLITUS WITH BOTH EYES AFFECTED BY MILD NONPROLIFERATIVE RETINOPATHY WITHOUT MACULAR EDEMA, WITHOUT LONG-TERM CURRENT USE OF INSULIN (HCC): ICD-10-CM

## 2020-07-22 DIAGNOSIS — I63.9 STROKE WITH CEREBRAL ISCHEMIA (HCC): ICD-10-CM

## 2020-07-22 DIAGNOSIS — Z00.00 ADULT GENERAL MEDICAL EXAMINATION: ICD-10-CM

## 2020-07-22 DIAGNOSIS — R35.1 BENIGN PROSTATIC HYPERPLASIA WITH NOCTURIA: ICD-10-CM

## 2020-07-22 DIAGNOSIS — D48.9 NEOPLASM, UNCERTAIN WHETHER BENIGN OR MALIGNANT: ICD-10-CM

## 2020-07-22 DIAGNOSIS — E87.5 HYPERKALEMIA: ICD-10-CM

## 2020-07-22 DIAGNOSIS — Z85.46 HISTORY OF PROSTATE CANCER: ICD-10-CM

## 2020-07-22 DIAGNOSIS — Z82.49 FAMILY HISTORY OF ISCHEMIC HEART DISEASE: ICD-10-CM

## 2020-07-22 DIAGNOSIS — E78.2 MIXED HYPERLIPIDEMIA: ICD-10-CM

## 2020-07-22 DIAGNOSIS — E66.3 OVERWEIGHT: ICD-10-CM

## 2020-07-22 DIAGNOSIS — I51.7 LEFT ATRIAL ENLARGEMENT: ICD-10-CM

## 2020-07-22 DIAGNOSIS — J30.1 SEASONAL ALLERGIC RHINITIS DUE TO POLLEN: ICD-10-CM

## 2020-07-22 DIAGNOSIS — H35.372 EPIRETINAL MEMBRANE, LEFT: ICD-10-CM

## 2020-07-22 DIAGNOSIS — R79.9 ELEVATED BUN: ICD-10-CM

## 2020-07-22 DIAGNOSIS — E08.649 DIABETES MELLITUS DUE TO UNDERLYING CONDITION, UNCONTROLLED, WITH HYPOGLYCEMIA WITHOUT COMA (HCC): ICD-10-CM

## 2020-07-22 DIAGNOSIS — C61 MALIGNANT NEOPLASM OF PROSTATE (HCC): ICD-10-CM

## 2020-07-22 DIAGNOSIS — H25.093 AGE-RELATED INCIPIENT CATARACT OF BOTH EYES: ICD-10-CM

## 2020-07-22 DIAGNOSIS — M79.10 MYALGIA: ICD-10-CM

## 2020-07-22 DIAGNOSIS — I10 HYPERTENSION, BENIGN: ICD-10-CM

## 2020-07-22 DIAGNOSIS — D50.9 MICROCYTIC ANEMIA: ICD-10-CM

## 2020-07-22 DIAGNOSIS — E11.21 DIABETIC NEPHROPATHY ASSOCIATED WITH TYPE 2 DIABETES MELLITUS (HCC): ICD-10-CM

## 2020-07-22 DIAGNOSIS — F43.9 SITUATIONAL STRESS: ICD-10-CM

## 2020-07-22 DIAGNOSIS — E11.641 UNCONTROLLED TYPE 2 DIABETES MELLITUS WITH HYPOGLYCEMIA AND COMA (HCC): Primary | ICD-10-CM

## 2020-07-22 DIAGNOSIS — R79.89 ELEVATED LIVER FUNCTION TESTS: ICD-10-CM

## 2020-07-22 DIAGNOSIS — N40.1 BENIGN PROSTATIC HYPERPLASIA WITH NOCTURIA: ICD-10-CM

## 2020-07-22 DIAGNOSIS — E53.8 VITAMIN B12 DEFICIENCY: ICD-10-CM

## 2020-07-22 DIAGNOSIS — N28.9 RENAL INSUFFICIENCY, MILD: ICD-10-CM

## 2020-07-22 PROBLEM — N40.0 BPH (BENIGN PROSTATIC HYPERPLASIA): Status: RESOLVED | Noted: 2019-07-01 | Resolved: 2020-07-22

## 2020-07-22 PROCEDURE — 99396 PREV VISIT EST AGE 40-64: CPT | Performed by: FAMILY MEDICINE

## 2020-07-22 PROCEDURE — 96372 THER/PROPH/DIAG INJ SC/IM: CPT | Performed by: FAMILY MEDICINE

## 2020-07-22 PROCEDURE — 99214 OFFICE O/P EST MOD 30 MIN: CPT | Performed by: FAMILY MEDICINE

## 2020-07-22 RX ORDER — METOPROLOL SUCCINATE 50 MG/1
50 TABLET, EXTENDED RELEASE ORAL DAILY
Qty: 90 TABLET | Refills: 1
Start: 2020-07-22 | End: 2020-10-19

## 2020-07-22 RX ORDER — LISINOPRIL 20 MG/1
20 TABLET ORAL DAILY
Qty: 90 TABLET | Refills: 0
Start: 2020-07-22 | End: 2020-11-02 | Stop reason: SDUPTHER

## 2020-07-22 RX ORDER — LOVASTATIN 40 MG/1
40 TABLET ORAL EVERY EVENING
Qty: 90 TABLET | Refills: 1
Start: 2020-07-22 | End: 2020-10-26

## 2020-07-22 RX ORDER — GLYBURIDE 5 MG/1
10 TABLET ORAL 2 TIMES DAILY
Qty: 120 TABLET | Refills: 1
Start: 2020-07-22 | End: 2021-08-25

## 2020-07-22 RX ORDER — FLUTICASONE PROPIONATE 50 MCG
2 SPRAY, SUSPENSION (ML) NASAL DAILY
Qty: 16 ML | Refills: 3
Start: 2020-07-22 | End: 2020-09-16

## 2020-07-22 RX ORDER — CLOPIDOGREL BISULFATE 75 MG/1
75 TABLET ORAL DAILY
Qty: 90 TABLET | Refills: 1
Start: 2020-07-22 | End: 2020-10-19

## 2020-07-22 RX ORDER — CYANOCOBALAMIN 1000 UG/ML
1000 INJECTION, SOLUTION INTRAMUSCULAR; SUBCUTANEOUS
Status: SHIPPED | OUTPATIENT
Start: 2020-07-22

## 2020-07-22 RX ORDER — SYRINGE-NEEDLE,INSULIN,0.5 ML 28GX1/2"
80 SYRINGE, EMPTY DISPOSABLE MISCELLANEOUS NIGHTLY
Qty: 90 EACH | Refills: 3 | Status: SHIPPED | OUTPATIENT
Start: 2020-07-22 | End: 2021-06-24

## 2020-07-22 RX ADMIN — CYANOCOBALAMIN 1000 MCG: 1000 INJECTION, SOLUTION INTRAMUSCULAR; SUBCUTANEOUS at 17:27

## 2020-07-22 NOTE — ASSESSMENT & PLAN NOTE
Labs done 7-, read by me, reviewed with pt.  Trig. 228 up from 169, Tot. Chol. 154 up from 139, HDL 34 down from 36, LDL 74 up from 69.  Worsening.  Discussed increasing medication.  Pt. Declines at present.  Encouraged to watch fatty intake, exercise more, and lose weight.   Compliant with medication   Is not getting adequate diet and exercise  Goals developed at last visit were not met because diet and exercise.  Follow up in 3  months  Care management needs are self-addressed.  Self-management abilities addressed and patient is capable of managing his own disease.

## 2020-07-22 NOTE — ASSESSMENT & PLAN NOTE
Worse.  Labs done 7-, read by me, reviewed with pt.  Creatinine was 1.39 up from 1.36, EGFR was 54 down from 56.  Avoid anti-inflammatories and keep diabetes and tight control

## 2020-07-22 NOTE — ASSESSMENT & PLAN NOTE
Right calf, sounds like muscle spasms, benefits of statin outweigh the risks.  Labs done 7-, read by me, reviewed with pt.  CPK was 53

## 2020-07-22 NOTE — ASSESSMENT & PLAN NOTE
Labs done 7-, read by me, reviewed with pt.  A1c was 11.1 up from 8.8  Microalbumin was 0.done 7-, read by me, reviewed with pt.   Worsening.   Will start pt. On Insulin 10 units at HS and increase by 5 units nightly until follow up in week.  Goal is to stop all oral medications except for possibly metformin.  He will continue all oral medications though until follow-up he is worried about hypoglycemia we spent some time discussing his.  Encouraged to watch sugar intake, exercise more and lose weight.   Compliant with medication.   Not monitoring sugar at home.-Patient strong encouraged to check blood sugars 3-4 times a day and write these down and bring them in for me to review next week  Follow up in 1 week.  Care management needs are self-addressed.  Self-management abilities addressed and patient is capable of managing his own disease.

## 2020-07-22 NOTE — ASSESSMENT & PLAN NOTE
Stable.  Labs done 7-, read by me, reviewed with pt.  Vit. B12 was 206 down from 209, MMA was 280.  Pt. Given B12 injection today.

## 2020-07-22 NOTE — ASSESSMENT & PLAN NOTE
New dx.  Scattered over the back.  Advised excision or referral to dermatologist.  Pt. Declines and prefers to follow in 3 months

## 2020-07-28 DIAGNOSIS — E11.641 UNCONTROLLED TYPE 2 DIABETES MELLITUS WITH HYPOGLYCEMIA AND COMA (HCC): ICD-10-CM

## 2020-07-28 DIAGNOSIS — E08.649 DIABETES MELLITUS DUE TO UNDERLYING CONDITION, UNCONTROLLED, WITH HYPOGLYCEMIA WITHOUT COMA (HCC): ICD-10-CM

## 2020-08-03 ENCOUNTER — OFFICE VISIT (OUTPATIENT)
Dept: FAMILY MEDICINE CLINIC | Facility: CLINIC | Age: 60
End: 2020-08-03

## 2020-08-03 VITALS
BODY MASS INDEX: 34.79 KG/M2 | HEART RATE: 78 BPM | DIASTOLIC BLOOD PRESSURE: 74 MMHG | OXYGEN SATURATION: 98 % | TEMPERATURE: 97.8 F | RESPIRATION RATE: 15 BRPM | HEIGHT: 70 IN | SYSTOLIC BLOOD PRESSURE: 138 MMHG | WEIGHT: 243 LBS

## 2020-08-03 DIAGNOSIS — I10 HYPERTENSION, BENIGN: ICD-10-CM

## 2020-08-03 DIAGNOSIS — E13.65 UNCONTROLLED OTHER SPECIFIED DIABETES MELLITUS WITH HYPERGLYCEMIA (HCC): Primary | ICD-10-CM

## 2020-08-03 PROCEDURE — 99213 OFFICE O/P EST LOW 20 MIN: CPT | Performed by: FAMILY MEDICINE

## 2020-08-03 NOTE — ASSESSMENT & PLAN NOTE
Hypertension is worsening.  Continue current treatment regimen.  Dietary sodium restriction.  Weight loss.  Regular aerobic exercise.  Continue current medications.  Blood pressure will be reassessed in 3 months.

## 2020-08-03 NOTE — PROGRESS NOTES
Subjective   Thor Crouch is a 59 y.o. male.     Diabetes   He presents for his follow-up (Uncontrolled) diabetic visit. He has type 2 diabetes mellitus. His disease course has been improving. There are no hypoglycemic associated symptoms. Pertinent negatives for hypoglycemia include no headaches. Pertinent negatives for diabetes include no blurred vision, no chest pain, no polyphagia and no polyuria. There are no hypoglycemic complications. There are no diabetic complications. Risk factors for coronary artery disease include dyslipidemia, diabetes mellitus and hypertension.   Hypertension   This is a chronic problem. The current episode started more than 1 year ago. The problem has been gradually worsening since onset. The problem is controlled. Pertinent negatives include no anxiety, blurred vision, chest pain, headaches, orthopnea, palpitations, peripheral edema or shortness of breath.        The following portions of the patient's history were reviewed and updated as appropriate: current medications, past family history, past medical history, past social history, past surgical history and problem list.    Family History   Problem Relation Age of Onset   • Arthritis Mother    • Heart disease Mother         ischemic   • Goiter Father    • Heart disease Father         ischemic   • Heart attack Father    • Diabetes Sister         diabetes mellitus   • Diabetes Brother         diabetes mellitus   • Diabetes Maternal Uncle         diabetes mellitus   • Diabetes Paternal Grandmother         diabetes mellitus   • Stroke Paternal Grandfather    • Kidney failure Other         Uncle   • Arthritis Other         grandmother       Social History     Tobacco Use   • Smoking status: Never Smoker   • Smokeless tobacco: Never Used   Substance Use Topics   • Alcohol use: No     Frequency: Never   • Drug use: No       Past Surgical History:   Procedure Laterality Date   • PROSTATE SURGERY         Patient Active Problem List  "  Diagnosis   • Adult general medical examination   • Cataract of both eyes   • Diabetic nephropathy associated with type 2 diabetes mellitus (CMS/HCC)   • Epiretinal membrane, left   • Family history of ischemic heart disease   • History of prostate cancer   • Hyperlipidemia   • Hypertension, benign   • Left atrial enlargement   • Obstructive sleep apnea   • Overweight   • Renal insufficiency, mild   • Seasonal allergic rhinitis due to pollen   • Situational stress   • Type 2 diabetes mellitus with both eyes affected by mild nonproliferative retinopathy without macular edema, without long-term current use of insulin (CMS/HCC)   • Vitamin B12 deficiency   • Microcytic anemia   • Uncontrolled diabetes mellitus (CMS/HCC)   • Elevated liver function tests   • Hyperkalemia   • Myalgia   • Stroke with cerebral ischemia (CMS/HCC)   • Malignant neoplasm of prostate (CMS/HCC)   • Neoplasm, uncertain whether benign or malignant   • Elevated BUN       Current Outpatient Medications on File Prior to Visit   Medication Sig Dispense Refill   • ACCU-CHEK FASTCLIX LANCETS misc TEST BLOOD SUGAR ONCE DAILY 50 each 5   • clopidogrel (Plavix) 75 MG tablet Take 1 tablet by mouth Daily. 90 tablet 1   • Empagliflozin (Jardiance) 10 MG tablet Take 10 mg by mouth Daily. 90 tablet 1   • fluticasone (FLONASE) 50 MCG/ACT nasal spray 2 sprays by Each Nare route Daily. 16 mL 3   • glucose blood (Accu-Chek Guide) test strip 1 each by Other route Daily. 100 each 5   • glyburide (DIAbeta) 5 MG tablet Take 2 tablets by mouth 2 (Two) Times a Day. 120 tablet 1   • insulin NPH (NovoLIN N) 100 UNIT/ML injection Inject 80 Units under the skin into the appropriate area as directed Every Night. 30 mL 12   • Insulin Syringe 30G X 1/2\" 0.5 ML misc 80 Units Every Night. 90 each 3   • lisinopril (PRINIVIL,ZESTRIL) 20 MG tablet Take 1 tablet by mouth Daily. 90 tablet 0   • lovastatin (MEVACOR) 40 MG tablet Take 1 tablet by mouth Every Evening. 90 tablet 1   • " "metFORMIN (GLUCOPHAGE) 1000 MG tablet TAKE 1 TABLET BY MOUTH TWICE DAILY 60 tablet 3   • metoprolol succinate XL (TOPROL-XL) 50 MG 24 hr tablet Take 1 tablet by mouth Daily. 90 tablet 1     Current Facility-Administered Medications on File Prior to Visit   Medication Dose Route Frequency Provider Last Rate Last Dose   • cyanocobalamin injection 1,000 mcg  1,000 mcg Intramuscular Q28 Days Hayes Sharp MD   1,000 mcg at 07/22/20 1727       Allergies   Allergen Reactions   • Sulfa Antibiotics Dizziness       Review of Systems   Eyes: Negative for blurred vision and visual disturbance.   Respiratory: Negative for shortness of breath.    Cardiovascular: Negative for chest pain, palpitations and orthopnea.   Endocrine: Negative for polyphagia and polyuria.   Genitourinary: Negative for frequency.   Skin: Negative for skin lesions.   Neurological: Negative for syncope and numbness.       Objective   Visit Vitals  /74 (BP Location: Right arm, Patient Position: Sitting, Cuff Size: Large Adult)   Pulse 78   Temp 97.8 °F (36.6 °C)   Resp 15   Ht 177.8 cm (70\")   Wt 110 kg (243 lb)   SpO2 98%   BMI 34.87 kg/m²     Physical Exam   Constitutional: He is oriented to person, place, and time. He appears well-developed and well-nourished. He is cooperative.   HENT:   Head: Normocephalic.   Neck: Trachea normal. Neck supple. Carotid bruit is not present. No thyromegaly present.   Cardiovascular: Normal rate and regular rhythm. Exam reveals no gallop and no friction rub.   No murmur heard.  Pulmonary/Chest: Effort normal and breath sounds normal. No respiratory distress. He has no wheezes. He exhibits no tenderness.   Neurological: He is alert and oriented to person, place, and time.   Skin: Skin is dry. No rash noted. Nails show no clubbing.   Nursing note and vitals reviewed.        Assessment/Plan .  Problem List Items Addressed This Visit        Medium    Hypertension, benign    Current Assessment & Plan     Hypertension " is worsening.  Continue current treatment regimen.  Dietary sodium restriction.  Weight loss.  Regular aerobic exercise.  Continue current medications.  Blood pressure will be reassessed in 3 months.         Uncontrolled diabetes mellitus (CMS/[facility]) - Primary    Current Assessment & Plan     Improving; Reviewed home blood glucose readings and scanned into the chart. Continue Novolin N-dosage of increasing by 5 units to a max of 50units, gradually decrease glyburide gradually.  Follow up in 3 weeks. Encourged to watch sugar intake, exercise more, lose weight,

## 2020-08-03 NOTE — ASSESSMENT & PLAN NOTE
Improving; Reviewed home blood glucose readings and scanned into the chart. Continue Novolin N-dosage of increasing by 5 units to a max of 50units, gradually decrease glyburide gradually.  Follow up in 3 weeks. Encourged to watch sugar intake, exercise more, lose weight,

## 2020-08-24 ENCOUNTER — OFFICE VISIT (OUTPATIENT)
Dept: FAMILY MEDICINE CLINIC | Facility: CLINIC | Age: 60
End: 2020-08-24

## 2020-08-24 DIAGNOSIS — E13.65 UNCONTROLLED OTHER SPECIFIED DIABETES MELLITUS WITH HYPERGLYCEMIA (HCC): Primary | ICD-10-CM

## 2020-08-24 PROCEDURE — 99213 OFFICE O/P EST LOW 20 MIN: CPT | Performed by: FAMILY MEDICINE

## 2020-08-24 NOTE — PROGRESS NOTES
Subjective   Thor Crouch is a 60 y.o. male.     Is in for an extra visit for uncontrolled diabetes and newly started on insulin    Diabetes   He presents for his follow-up diabetic visit. He has type 2 diabetes mellitus. His disease course has been improving. There are no hypoglycemic associated symptoms. There are no diabetic associated symptoms. There are no hypoglycemic complications. Symptoms are stable. Risk factors for coronary artery disease include dyslipidemia, diabetes mellitus and hypertension.   Hypertension   This is a chronic problem. The current episode started more than 1 year ago. The problem has been gradually worsening since onset. The problem is controlled.        The following portions of the patient's history were reviewed and updated as appropriate: current medications, past family history, past medical history, past social history, past surgical history and problem list.    Family History   Problem Relation Age of Onset   • Arthritis Mother    • Heart disease Mother         ischemic   • Goiter Father    • Heart disease Father         ischemic   • Heart attack Father    • Diabetes Sister         diabetes mellitus   • Diabetes Brother         diabetes mellitus   • Diabetes Maternal Uncle         diabetes mellitus   • Diabetes Paternal Grandmother         diabetes mellitus   • Stroke Paternal Grandfather    • Kidney failure Other         Uncle   • Arthritis Other         grandmother       Social History     Tobacco Use   • Smoking status: Never Smoker   • Smokeless tobacco: Never Used   Substance Use Topics   • Alcohol use: No     Frequency: Never   • Drug use: No       Past Surgical History:   Procedure Laterality Date   • PROSTATE SURGERY         Patient Active Problem List   Diagnosis   • Adult general medical examination   • Cataract of both eyes   • Diabetic nephropathy associated with type 2 diabetes mellitus (CMS/HCC)   • Epiretinal membrane, left   • Family history of ischemic heart  "disease   • History of prostate cancer   • Hyperlipidemia   • Hypertension, benign   • Left atrial enlargement   • Obstructive sleep apnea   • Overweight   • Renal insufficiency, mild   • Seasonal allergic rhinitis due to pollen   • Situational stress   • Type 2 diabetes mellitus with both eyes affected by mild nonproliferative retinopathy without macular edema, without long-term current use of insulin (CMS/HCC)   • Vitamin B12 deficiency   • Microcytic anemia   • Uncontrolled diabetes mellitus (CMS/HCC)   • Elevated liver function tests   • Hyperkalemia   • Myalgia   • Stroke with cerebral ischemia (CMS/HCC)   • Malignant neoplasm of prostate (CMS/HCC)   • Neoplasm, uncertain whether benign or malignant   • Elevated BUN       Current Outpatient Medications on File Prior to Visit   Medication Sig Dispense Refill   • ACCU-CHEK FASTCLIX LANCETS misc TEST BLOOD SUGAR ONCE DAILY 50 each 5   • clopidogrel (Plavix) 75 MG tablet Take 1 tablet by mouth Daily. 90 tablet 1   • Empagliflozin (Jardiance) 10 MG tablet Take 10 mg by mouth Daily. 90 tablet 1   • fluticasone (FLONASE) 50 MCG/ACT nasal spray 2 sprays by Each Nare route Daily. 16 mL 3   • glucose blood (Accu-Chek Guide) test strip 1 each by Other route Daily. 100 each 5   • glyburide (DIAbeta) 5 MG tablet Take 2 tablets by mouth 2 (Two) Times a Day. 120 tablet 1   • insulin NPH (NovoLIN N) 100 UNIT/ML injection Inject 80 Units under the skin into the appropriate area as directed Every Night. 30 mL 12   • Insulin Syringe 30G X 1/2\" 0.5 ML misc 80 Units Every Night. 90 each 3   • lisinopril (PRINIVIL,ZESTRIL) 20 MG tablet Take 1 tablet by mouth Daily. 90 tablet 0   • lovastatin (MEVACOR) 40 MG tablet Take 1 tablet by mouth Every Evening. 90 tablet 1   • metFORMIN (GLUCOPHAGE) 1000 MG tablet TAKE 1 TABLET BY MOUTH TWICE DAILY 60 tablet 3   • metoprolol succinate XL (TOPROL-XL) 50 MG 24 hr tablet Take 1 tablet by mouth Daily. 90 tablet 1     Current Facility-Administered " Medications on File Prior to Visit   Medication Dose Route Frequency Provider Last Rate Last Dose   • cyanocobalamin injection 1,000 mcg  1,000 mcg Intramuscular Q28 Days Hayes Sharp MD   1,000 mcg at 07/22/20 1727       Allergies   Allergen Reactions   • Sulfa Antibiotics Dizziness       Review of Systems    Objective   Visit Vitals  /75   Pulse 70   Temp 98 °F (36.7 °C)   Wt 112 kg (246 lb 12.8 oz)   SpO2 97%   BMI 35.41 kg/m²     Physical Exam   Constitutional: He is oriented to person, place, and time. He appears well-developed and well-nourished. He is cooperative.   HENT:   Head: Normocephalic.   Neck: Trachea normal. Neck supple. Carotid bruit is not present. No thyromegaly present.   Cardiovascular: Normal rate and regular rhythm. Exam reveals no gallop and no friction rub.   No murmur heard.  Pulmonary/Chest: Effort normal and breath sounds normal. No respiratory distress. He has no wheezes. He exhibits no tenderness.   Neurological: He is alert and oriented to person, place, and time.   Skin: Skin is dry. No rash noted. Nails show no clubbing.   Nursing note and vitals reviewed.        Assessment/Plan .  Problem List Items Addressed This Visit        Medium    Uncontrolled diabetes mellitus (CMS/[facility]) - Primary    Current Assessment & Plan     Improving-Continue to increase Novolin N by 5 units to a max of 80 units. Continue to decrease glipuride dosage based on blood sugar level. Encourged to watch sugar intake, exercise more, lose weight,

## 2020-08-24 NOTE — ASSESSMENT & PLAN NOTE
Improving-Continue to increase Novolin N by 5 units to a max of 80 units. Continue to decrease glipuride dosage based on blood sugar level. Encourged to watch sugar intake, exercise more, lose weight,      Detail Level: Detailed

## 2020-08-29 VITALS
OXYGEN SATURATION: 97 % | TEMPERATURE: 98 F | HEART RATE: 70 BPM | SYSTOLIC BLOOD PRESSURE: 136 MMHG | DIASTOLIC BLOOD PRESSURE: 75 MMHG | WEIGHT: 246.8 LBS | BODY MASS INDEX: 35.41 KG/M2

## 2020-08-31 DIAGNOSIS — E11.9 TYPE 2 DIABETES MELLITUS WITHOUT COMPLICATION, WITHOUT LONG-TERM CURRENT USE OF INSULIN (HCC): ICD-10-CM

## 2020-08-31 RX ORDER — LANCETS
EACH MISCELLANEOUS
Qty: 102 EACH | Refills: 5 | Status: SHIPPED | OUTPATIENT
Start: 2020-08-31 | End: 2020-09-01 | Stop reason: SDUPTHER

## 2020-08-31 NOTE — TELEPHONE ENCOUNTER
Thor called and needs a rx for his lancents to test 3 daily, Please send to [facility] in [place] and contact him at . Thanks Columba

## 2020-09-01 DIAGNOSIS — E11.9 TYPE 2 DIABETES MELLITUS WITHOUT COMPLICATION, WITHOUT LONG-TERM CURRENT USE OF INSULIN (HCC): ICD-10-CM

## 2020-09-01 RX ORDER — LANCETS
EACH MISCELLANEOUS
Qty: 102 EACH | Refills: 5 | Status: SHIPPED | OUTPATIENT
Start: 2020-09-01 | End: 2020-10-05 | Stop reason: SDUPTHER

## 2020-09-16 DIAGNOSIS — J30.1 SEASONAL ALLERGIC RHINITIS DUE TO POLLEN: ICD-10-CM

## 2020-09-16 RX ORDER — FLUTICASONE PROPIONATE 50 UG/1
SPRAY, METERED NASAL
Qty: 3 BOTTLE | Refills: 0 | Status: SHIPPED | OUTPATIENT
Start: 2020-09-16 | End: 2021-05-10 | Stop reason: SDUPTHER

## 2020-09-21 ENCOUNTER — OFFICE VISIT (OUTPATIENT)
Dept: FAMILY MEDICINE CLINIC | Facility: CLINIC | Age: 60
End: 2020-09-21

## 2020-09-21 VITALS
WEIGHT: 249.4 LBS | TEMPERATURE: 97.5 F | HEART RATE: 79 BPM | HEIGHT: 70 IN | RESPIRATION RATE: 15 BRPM | DIASTOLIC BLOOD PRESSURE: 78 MMHG | SYSTOLIC BLOOD PRESSURE: 141 MMHG | OXYGEN SATURATION: 99 % | BODY MASS INDEX: 35.71 KG/M2

## 2020-09-21 DIAGNOSIS — I10 HYPERTENSION, BENIGN: ICD-10-CM

## 2020-09-21 DIAGNOSIS — E13.65 UNCONTROLLED OTHER SPECIFIED DIABETES MELLITUS WITH HYPERGLYCEMIA (HCC): Primary | ICD-10-CM

## 2020-09-21 PROCEDURE — 99213 OFFICE O/P EST LOW 20 MIN: CPT | Performed by: FAMILY MEDICINE

## 2020-09-21 NOTE — ASSESSMENT & PLAN NOTE
Improving-except for 1 hypoglycemic episode. Advised patient to decrease Glyburide to 1 tablet a day and increase Insulin to up 80units max. Continue to monitor blood sugar at home. Recommended that he keep some candy available if hypoglycemic episode occurs again. Encourged to watch sugar intake, exercise more, lose weight,

## 2020-09-21 NOTE — PROGRESS NOTES
Subjective   Thor Crouch is a 60 y.o. male.     Diabetes  He presents for his follow-up diabetic visit. He has type 2 diabetes mellitus. His disease course has been fluctuating. Hypoglycemia symptoms include sweats. There are no diabetic associated symptoms. Pertinent negatives for diabetes include no chest pain, no fatigue, no polyphagia, no polyuria and no weight loss. There are no hypoglycemic complications. There are no diabetic complications. Risk factors for coronary artery disease include dyslipidemia and hypertension. Current diabetic treatment includes oral agent (triple therapy). He is compliant with treatment all of the time.   Hypertension  This is a chronic problem. The current episode started more than 1 year ago. The problem has been waxing and waning since onset. The problem is controlled. Associated symptoms include sweats. Pertinent negatives include no chest pain, palpitations or shortness of breath. Risk factors for coronary artery disease include diabetes mellitus and dyslipidemia. Current antihypertension treatment includes beta blockers.        The following portions of the patient's history were reviewed and updated as appropriate: current medications, past family history, past medical history, past social history, past surgical history and problem list.    Family History   Problem Relation Age of Onset   • Arthritis Mother    • Heart disease Mother         ischemic   • Goiter Father    • Heart disease Father         ischemic   • Heart attack Father    • Diabetes Sister         diabetes mellitus   • Diabetes Brother         diabetes mellitus   • Diabetes Maternal Uncle         diabetes mellitus   • Diabetes Paternal Grandmother         diabetes mellitus   • Stroke Paternal Grandfather    • Kidney failure Other         Uncle   • Arthritis Other         grandmother       Social History     Tobacco Use   • Smoking status: Never Smoker   • Smokeless tobacco: Never Used   Substance Use Topics   •  "Alcohol use: No     Frequency: Never   • Drug use: No       Past Surgical History:   Procedure Laterality Date   • PROSTATE SURGERY         Patient Active Problem List   Diagnosis   • Adult general medical examination   • Cataract of both eyes   • Diabetic nephropathy associated with type 2 diabetes mellitus (CMS/HCC)   • Epiretinal membrane, left   • Family history of ischemic heart disease   • History of prostate cancer   • Hyperlipidemia   • Hypertension, benign   • Left atrial enlargement   • Obstructive sleep apnea   • Overweight   • Renal insufficiency, mild   • Seasonal allergic rhinitis due to pollen   • Situational stress   • Type 2 diabetes mellitus with both eyes affected by mild nonproliferative retinopathy without macular edema, without long-term current use of insulin (CMS/HCC)   • Vitamin B12 deficiency   • Microcytic anemia   • Uncontrolled diabetes mellitus (CMS/HCC)   • Elevated liver function tests   • Hyperkalemia   • Myalgia   • Stroke with cerebral ischemia (CMS/HCC)   • Malignant neoplasm of prostate (CMS/HCC)   • Neoplasm, uncertain whether benign or malignant   • Elevated BUN       Current Outpatient Medications on File Prior to Visit   Medication Sig Dispense Refill   • Accu-Chek FastClix Lancets misc Check blood sugar 3 x daily 102 each 5   • clopidogrel (Plavix) 75 MG tablet Take 1 tablet by mouth Daily. 90 tablet 1   • Empagliflozin (Jardiance) 10 MG tablet Take 10 mg by mouth Daily. 90 tablet 1   • Flonase Allergy Relief 50 MCG/ACT nasal spray SHAKE LIQUID AND USE 2 SPRAYS IN EACH NOSTRIL DAILY 3 bottle 0   • glucose blood (Accu-Chek Guide) test strip 1 each by Other route Daily. 100 each 5   • glyburide (DIAbeta) 5 MG tablet Take 2 tablets by mouth 2 (Two) Times a Day. 120 tablet 1   • insulin NPH (NovoLIN N) 100 UNIT/ML injection Inject 80 Units under the skin into the appropriate area as directed Every Night. 30 mL 12   • Insulin Syringe 30G X 1/2\" 0.5 ML misc 80 Units Every Night. " "90 each 3   • lisinopril (PRINIVIL,ZESTRIL) 20 MG tablet Take 1 tablet by mouth Daily. 90 tablet 0   • lovastatin (MEVACOR) 40 MG tablet Take 1 tablet by mouth Every Evening. 90 tablet 1   • metFORMIN (GLUCOPHAGE) 1000 MG tablet TAKE 1 TABLET BY MOUTH TWICE DAILY 60 tablet 3   • metoprolol succinate XL (TOPROL-XL) 50 MG 24 hr tablet Take 1 tablet by mouth Daily. 90 tablet 1     Current Facility-Administered Medications on File Prior to Visit   Medication Dose Route Frequency Provider Last Rate Last Dose   • cyanocobalamin injection 1,000 mcg  1,000 mcg Intramuscular Q28 Days Hayes Sharp MD   1,000 mcg at 07/22/20 1727       Allergies   Allergen Reactions   • Sulfa Antibiotics Dizziness       Review of Systems   Constitutional: Negative for fatigue, unexpected weight gain and unexpected weight loss.   Eyes: Negative for visual disturbance.   Respiratory: Negative for shortness of breath.    Cardiovascular: Negative for chest pain, palpitations and leg swelling.   Endocrine: Negative for polyphagia and polyuria.   Genitourinary: Negative for frequency.   Skin: Negative for skin lesions.   Neurological: Negative for syncope, numbness and headache.       Objective   Visit Vitals  /78 (BP Location: Left arm, Patient Position: Sitting, Cuff Size: Large Adult)   Pulse 79   Temp 97.5 °F (36.4 °C)   Resp 15   Ht 177.8 cm (70\")   Wt 113 kg (249 lb 6.4 oz)   SpO2 99%   BMI 35.79 kg/m²     Physical Exam  Vitals signs and nursing note reviewed.   Constitutional:       Appearance: He is well-developed.   HENT:      Head: Normocephalic.   Neck:      Musculoskeletal: Neck supple.      Thyroid: No thyromegaly.      Vascular: No carotid bruit.      Trachea: Trachea normal.   Cardiovascular:      Rate and Rhythm: Normal rate and regular rhythm.      Heart sounds: No murmur. No friction rub. No gallop.    Pulmonary:      Effort: Pulmonary effort is normal. No respiratory distress.      Breath sounds: Normal breath sounds. " No wheezing.   Chest:      Chest wall: No tenderness.   Skin:     General: Skin is dry.      Findings: No rash.      Nails: There is no clubbing.     Neurological:      Mental Status: He is alert and oriented to person, place, and time.   Psychiatric:         Behavior: Behavior is cooperative.           Assessment/Plan .  Problem List Items Addressed This Visit        Medium    Hypertension, benign    Current Assessment & Plan     Borderline poor control; Encouraged to watch salt, exercise more and lose weight.           Uncontrolled diabetes mellitus (CMS/[facility]) - Primary    Current Assessment & Plan     Improving-except for 1 hypoglycemic episode. Advised patient to decrease Glyburide to 1 tablet a day and increase Insulin to up 80units max. Continue to monitor blood sugar at home. Recommended that he keep some candy available if hypoglycemic episode occurs again. Encourged to watch sugar intake, exercise more, lose weight,

## 2020-10-05 DIAGNOSIS — E11.641 UNCONTROLLED TYPE 2 DIABETES MELLITUS WITH HYPOGLYCEMIA AND COMA (HCC): ICD-10-CM

## 2020-10-05 DIAGNOSIS — E08.649 DIABETES MELLITUS DUE TO UNDERLYING CONDITION, UNCONTROLLED, WITH HYPOGLYCEMIA WITHOUT COMA (HCC): ICD-10-CM

## 2020-10-05 DIAGNOSIS — E11.9 TYPE 2 DIABETES MELLITUS WITHOUT COMPLICATION, WITHOUT LONG-TERM CURRENT USE OF INSULIN (HCC): ICD-10-CM

## 2020-10-05 RX ORDER — BLOOD SUGAR DIAGNOSTIC
1 STRIP MISCELLANEOUS 3 TIMES DAILY
Qty: 100 EACH | Refills: 12
Start: 2020-10-05 | End: 2020-10-07 | Stop reason: SDUPTHER

## 2020-10-05 RX ORDER — LANCETS
EACH MISCELLANEOUS
Qty: 102 EACH | Refills: 5 | Status: SHIPPED | OUTPATIENT
Start: 2020-10-05 | End: 2020-10-07 | Stop reason: SDUPTHER

## 2020-10-07 DIAGNOSIS — E11.641 UNCONTROLLED TYPE 2 DIABETES MELLITUS WITH HYPOGLYCEMIA AND COMA (HCC): ICD-10-CM

## 2020-10-07 DIAGNOSIS — E11.9 TYPE 2 DIABETES MELLITUS WITHOUT COMPLICATION, WITHOUT LONG-TERM CURRENT USE OF INSULIN (HCC): ICD-10-CM

## 2020-10-07 DIAGNOSIS — E08.649 DIABETES MELLITUS DUE TO UNDERLYING CONDITION, UNCONTROLLED, WITH HYPOGLYCEMIA WITHOUT COMA (HCC): ICD-10-CM

## 2020-10-08 ENCOUNTER — TELEPHONE (OUTPATIENT)
Dept: FAMILY MEDICINE CLINIC | Facility: CLINIC | Age: 60
End: 2020-10-08

## 2020-10-08 RX ORDER — LANCETS
EACH MISCELLANEOUS
Qty: 102 EACH | Refills: 5 | Status: SHIPPED | OUTPATIENT
Start: 2020-10-08 | End: 2021-08-25 | Stop reason: SDUPTHER

## 2020-10-08 RX ORDER — BLOOD SUGAR DIAGNOSTIC
1 STRIP MISCELLANEOUS 3 TIMES DAILY
Qty: 100 EACH | Refills: 12
Start: 2020-10-08 | End: 2020-10-09 | Stop reason: SDUPTHER

## 2020-10-08 NOTE — TELEPHONE ENCOUNTER
He wanted to see if we could print out his rx for lancets and test strips? The pharmacy is stating that they never received them. Please call him on cell phone when ready to .

## 2020-10-09 DIAGNOSIS — E11.641 UNCONTROLLED TYPE 2 DIABETES MELLITUS WITH HYPOGLYCEMIA AND COMA (HCC): ICD-10-CM

## 2020-10-09 DIAGNOSIS — E08.649 DIABETES MELLITUS DUE TO UNDERLYING CONDITION, UNCONTROLLED, WITH HYPOGLYCEMIA WITHOUT COMA (HCC): ICD-10-CM

## 2020-10-09 RX ORDER — BLOOD SUGAR DIAGNOSTIC
1 STRIP MISCELLANEOUS 3 TIMES DAILY
Qty: 100 EACH | Refills: 12 | Status: SHIPPED | OUTPATIENT
Start: 2020-10-09 | End: 2021-08-25 | Stop reason: SDUPTHER

## 2020-10-19 DIAGNOSIS — I10 HYPERTENSION, BENIGN: ICD-10-CM

## 2020-10-19 DIAGNOSIS — I51.7 LEFT ATRIAL ENLARGEMENT: ICD-10-CM

## 2020-10-19 RX ORDER — CLOPIDOGREL BISULFATE 75 MG/1
75 TABLET ORAL DAILY
Qty: 90 TABLET | Refills: 1 | Status: SHIPPED | OUTPATIENT
Start: 2020-10-19 | End: 2021-02-08 | Stop reason: SDUPTHER

## 2020-10-19 RX ORDER — METOPROLOL SUCCINATE 50 MG/1
50 TABLET, EXTENDED RELEASE ORAL DAILY
Qty: 90 TABLET | Refills: 1 | Status: SHIPPED | OUTPATIENT
Start: 2020-10-19 | End: 2021-02-08 | Stop reason: SDUPTHER

## 2020-10-24 ENCOUNTER — RESULTS ENCOUNTER (OUTPATIENT)
Dept: FAMILY MEDICINE CLINIC | Facility: CLINIC | Age: 60
End: 2020-10-24

## 2020-10-24 DIAGNOSIS — D50.9 MICROCYTIC ANEMIA: ICD-10-CM

## 2020-10-24 DIAGNOSIS — E53.8 VITAMIN B12 DEFICIENCY: ICD-10-CM

## 2020-10-24 DIAGNOSIS — E78.2 MIXED HYPERLIPIDEMIA: ICD-10-CM

## 2020-10-24 DIAGNOSIS — E11.641 UNCONTROLLED TYPE 2 DIABETES MELLITUS WITH HYPOGLYCEMIA AND COMA (HCC): ICD-10-CM

## 2020-10-25 LAB
ALBUMIN SERPL-MCNC: 4.1 G/DL (ref 3.8–4.8)
ALBUMIN/GLOB SERPL: 1.6 {RATIO} (ref 1.2–2.2)
ALP SERPL-CCNC: 77 IU/L (ref 39–117)
ALT SERPL-CCNC: 28 IU/L (ref 0–44)
AST SERPL-CCNC: 24 IU/L (ref 0–40)
BASOPHILS # BLD AUTO: 0.1 X10E3/UL (ref 0–0.2)
BASOPHILS NFR BLD AUTO: 1 %
BILIRUB SERPL-MCNC: 0.3 MG/DL (ref 0–1.2)
BUN SERPL-MCNC: 28 MG/DL (ref 8–27)
BUN/CREAT SERPL: 21 (ref 10–24)
CALCIUM SERPL-MCNC: 9.5 MG/DL (ref 8.6–10.2)
CHLORIDE SERPL-SCNC: 105 MMOL/L (ref 96–106)
CHOLEST SERPL-MCNC: 119 MG/DL (ref 100–199)
CHOLEST/HDLC SERPL: 3.5 RATIO (ref 0–5)
CO2 SERPL-SCNC: 26 MMOL/L (ref 20–29)
CREAT SERPL-MCNC: 1.33 MG/DL (ref 0.76–1.27)
EOSINOPHIL # BLD AUTO: 0.5 X10E3/UL (ref 0–0.4)
EOSINOPHIL NFR BLD AUTO: 7 %
ERYTHROCYTE [DISTWIDTH] IN BLOOD BY AUTOMATED COUNT: 13.1 % (ref 11.6–15.4)
GLOBULIN SER CALC-MCNC: 2.6 G/DL (ref 1.5–4.5)
GLUCOSE SERPL-MCNC: 102 MG/DL (ref 65–99)
HBA1C MFR BLD: 8.5 % (ref 4.8–5.6)
HCT VFR BLD AUTO: 45.6 % (ref 37.5–51)
HDLC SERPL-MCNC: 34 MG/DL
HGB BLD-MCNC: 14.7 G/DL (ref 13–17.7)
IMM GRANULOCYTES # BLD AUTO: 0 X10E3/UL (ref 0–0.1)
IMM GRANULOCYTES NFR BLD AUTO: 0 %
LDLC SERPL CALC-MCNC: 67 MG/DL (ref 0–99)
LYMPHOCYTES # BLD AUTO: 1.6 X10E3/UL (ref 0.7–3.1)
LYMPHOCYTES NFR BLD AUTO: 23 %
MCH RBC QN AUTO: 27.5 PG (ref 26.6–33)
MCHC RBC AUTO-ENTMCNC: 32.2 G/DL (ref 31.5–35.7)
MCV RBC AUTO: 85 FL (ref 79–97)
MONOCYTES # BLD AUTO: 0.7 X10E3/UL (ref 0.1–0.9)
MONOCYTES NFR BLD AUTO: 10 %
NEUTROPHILS # BLD AUTO: 4.1 X10E3/UL (ref 1.4–7)
NEUTROPHILS NFR BLD AUTO: 59 %
PLATELET # BLD AUTO: 360 X10E3/UL (ref 150–450)
POTASSIUM SERPL-SCNC: 5 MMOL/L (ref 3.5–5.2)
PROT SERPL-MCNC: 6.7 G/DL (ref 6–8.5)
RBC # BLD AUTO: 5.35 X10E6/UL (ref 4.14–5.8)
SODIUM SERPL-SCNC: 144 MMOL/L (ref 134–144)
TRIGL SERPL-MCNC: 93 MG/DL (ref 0–149)
VIT B12 SERPL-MCNC: 227 PG/ML (ref 232–1245)
VLDLC SERPL CALC-MCNC: 18 MG/DL (ref 5–40)
WBC # BLD AUTO: 6.9 X10E3/UL (ref 3.4–10.8)

## 2020-10-26 DIAGNOSIS — E78.2 MIXED HYPERLIPIDEMIA: ICD-10-CM

## 2020-10-26 RX ORDER — LOVASTATIN 40 MG/1
40 TABLET ORAL EVERY EVENING
Qty: 90 TABLET | Refills: 1 | Status: SHIPPED | OUTPATIENT
Start: 2020-10-26 | End: 2020-11-02 | Stop reason: SDUPTHER

## 2020-11-02 ENCOUNTER — OFFICE VISIT (OUTPATIENT)
Dept: FAMILY MEDICINE CLINIC | Facility: CLINIC | Age: 60
End: 2020-11-02

## 2020-11-02 VITALS
HEIGHT: 70 IN | OXYGEN SATURATION: 96 % | BODY MASS INDEX: 35.9 KG/M2 | RESPIRATION RATE: 18 BRPM | DIASTOLIC BLOOD PRESSURE: 80 MMHG | WEIGHT: 250.8 LBS | SYSTOLIC BLOOD PRESSURE: 150 MMHG | HEART RATE: 87 BPM | TEMPERATURE: 97.5 F

## 2020-11-02 DIAGNOSIS — D50.9 MICROCYTIC ANEMIA: ICD-10-CM

## 2020-11-02 DIAGNOSIS — I10 HYPERTENSION, BENIGN: ICD-10-CM

## 2020-11-02 DIAGNOSIS — R79.9 ELEVATED BUN: ICD-10-CM

## 2020-11-02 DIAGNOSIS — R79.89 ELEVATED LIVER FUNCTION TESTS: ICD-10-CM

## 2020-11-02 DIAGNOSIS — E78.2 MIXED HYPERLIPIDEMIA: ICD-10-CM

## 2020-11-02 DIAGNOSIS — E11.641 UNCONTROLLED TYPE 2 DIABETES MELLITUS WITH HYPOGLYCEMIA AND COMA (HCC): ICD-10-CM

## 2020-11-02 DIAGNOSIS — E53.8 VITAMIN B12 DEFICIENCY: ICD-10-CM

## 2020-11-02 DIAGNOSIS — N28.9 RENAL INSUFFICIENCY, MILD: ICD-10-CM

## 2020-11-02 DIAGNOSIS — E13.65 UNCONTROLLED OTHER SPECIFIED DIABETES MELLITUS WITH HYPERGLYCEMIA (HCC): ICD-10-CM

## 2020-11-02 DIAGNOSIS — E87.5 HYPERKALEMIA: Primary | ICD-10-CM

## 2020-11-02 DIAGNOSIS — E08.649 DIABETES MELLITUS DUE TO UNDERLYING CONDITION, UNCONTROLLED, WITH HYPOGLYCEMIA WITHOUT COMA (HCC): ICD-10-CM

## 2020-11-02 PROCEDURE — 96372 THER/PROPH/DIAG INJ SC/IM: CPT | Performed by: FAMILY MEDICINE

## 2020-11-02 PROCEDURE — 99214 OFFICE O/P EST MOD 30 MIN: CPT | Performed by: FAMILY MEDICINE

## 2020-11-02 RX ORDER — LOVASTATIN 40 MG/1
40 TABLET ORAL EVERY EVENING
Qty: 90 TABLET | Refills: 1
Start: 2020-11-02 | End: 2021-02-08 | Stop reason: SDUPTHER

## 2020-11-02 RX ORDER — LISINOPRIL 20 MG/1
20 TABLET ORAL DAILY
Qty: 90 TABLET | Refills: 1 | Status: SHIPPED | OUTPATIENT
Start: 2020-11-02 | End: 2021-02-08 | Stop reason: SDUPTHER

## 2020-11-02 RX ORDER — CYANOCOBALAMIN 1000 UG/ML
1000 INJECTION, SOLUTION INTRAMUSCULAR; SUBCUTANEOUS
Status: SHIPPED | OUTPATIENT
Start: 2020-11-02

## 2020-11-02 RX ORDER — EMPAGLIFLOZIN 10 MG/1
1 TABLET, FILM COATED ORAL DAILY
Qty: 90 TABLET | Refills: 1 | Status: SHIPPED | OUTPATIENT
Start: 2020-11-02 | End: 2021-05-10 | Stop reason: SDUPTHER

## 2020-11-02 RX ADMIN — CYANOCOBALAMIN 1000 MCG: 1000 INJECTION, SOLUTION INTRAMUSCULAR; SUBCUTANEOUS at 17:25

## 2020-11-02 NOTE — ASSESSMENT & PLAN NOTE
Borderline poor control.  He tolerates his lisinopril and metoprolol quite well without side effects benefits of the drugs outweigh the risk.  Encouraged to watch salt, exercise more and lose weight.

## 2020-11-02 NOTE — ASSESSMENT & PLAN NOTE
Labs done 10-, read by me, reviewed with pt.  Trig.93 down from 228, Tot. Chol. 119 down from 154, HDL 34 samew as last, LDL 67 down from 74.  Improved.  Not at goal  Encouraged to watch fatty intake, exercise more, and lose weight.   Compliant with medication.  Is not getting adequate diet and exercise  Goals developed at last visit were not met because diet and exercise  Follow up in 3  months  Care management needs are self-addressed.  Self-management abilities addressed and patient is capable of managing his own disease.

## 2020-11-02 NOTE — PROGRESS NOTES
Subjective   Thor Crouch is a 60 y.o. male.     Thor comes in for follow-up on chronic renal failure, anemia, abnormal BUN, hypokalemia, low B12 and elevated liver functions.    Diabetes  He presents for his follow-up diabetic visit. He has type 2 diabetes mellitus. His disease course has been improving. There are no hypoglycemic associated symptoms. There are no diabetic associated symptoms. Pertinent negatives for diabetes include no chest pain, no fatigue, no polyphagia, no polyuria and no weight loss. There are no hypoglycemic complications. Risk factors for coronary artery disease include diabetes mellitus, dyslipidemia, hypertension and obesity. Current diabetic treatment includes oral agent (triple therapy). He never participates in exercise. Eye exam is not current.   Hypertension  This is a chronic problem. The current episode started more than 1 year ago. The problem is controlled. Pertinent negatives include no chest pain, palpitations or shortness of breath. There are no associated agents to hypertension. Risk factors for coronary artery disease include diabetes mellitus, dyslipidemia and obesity. There are no compliance problems.  Identifiable causes of hypertension include chronic renal disease.   Hyperlipidemia  This is a chronic problem. The current episode started more than 1 year ago. The problem is controlled. Recent lipid tests were reviewed and are high. Exacerbating diseases include chronic renal disease, diabetes and obesity. Factors aggravating his hyperlipidemia include fatty foods. Pertinent negatives include no chest pain, myalgias or shortness of breath. Current antihyperlipidemic treatment includes statins. Risk factors for coronary artery disease include diabetes mellitus, dyslipidemia and hypertension.        The following portions of the patient's history were reviewed and updated as appropriate: allergies, current medications, past family history, past medical history, past social  history, past surgical history and problem list.    Family History   Problem Relation Age of Onset   • Arthritis Mother    • Heart disease Mother         ischemic   • Goiter Father    • Heart disease Father         ischemic   • Heart attack Father    • Diabetes Sister         diabetes mellitus   • Diabetes Brother         diabetes mellitus   • Diabetes Maternal Uncle         diabetes mellitus   • Diabetes Paternal Grandmother         diabetes mellitus   • Stroke Paternal Grandfather    • Kidney failure Other         Uncle   • Arthritis Other         grandmother       Social History     Tobacco Use   • Smoking status: Never Smoker   • Smokeless tobacco: Never Used   Substance Use Topics   • Alcohol use: No     Frequency: Never   • Drug use: No       Past Surgical History:   Procedure Laterality Date   • PROSTATE SURGERY         Patient Active Problem List   Diagnosis   • Adult general medical examination   • Cataract of both eyes   • Diabetic nephropathy associated with type 2 diabetes mellitus (CMS/HCC)   • Epiretinal membrane, left   • Family history of ischemic heart disease   • History of prostate cancer   • Hyperlipidemia   • Hypertension, benign   • Left atrial enlargement   • Obstructive sleep apnea   • Overweight   • Renal insufficiency, mild   • Seasonal allergic rhinitis due to pollen   • Situational stress   • Type 2 diabetes mellitus with both eyes affected by mild nonproliferative retinopathy without macular edema, without long-term current use of insulin (CMS/HCC)   • Vitamin B12 deficiency   • Microcytic anemia   • Uncontrolled diabetes mellitus (CMS/HCC)   • Elevated liver function tests   • Hyperkalemia   • Myalgia   • Stroke with cerebral ischemia (CMS/HCC)   • Malignant neoplasm of prostate (CMS/HCC)   • Neoplasm, uncertain whether benign or malignant   • Elevated BUN   • Frontal sinusitis   • Bronchitis   • Cough       Current Outpatient Medications on File Prior to Visit   Medication Sig Dispense  "Refill   • Accu-Chek FastClix Lancets misc Check blood sugar 3 x daily 102 each 5   • clopidogrel (PLAVIX) 75 MG tablet TAKE 1 TABLET BY MOUTH DAILY 90 tablet 1   • Flonase Allergy Relief 50 MCG/ACT nasal spray SHAKE LIQUID AND USE 2 SPRAYS IN EACH NOSTRIL DAILY 3 bottle 0   • glucose blood (Accu-Chek Guide) test strip 1 each by Other route 3 (Three) Times a Day. 100 each 12   • glyburide (DIAbeta) 5 MG tablet Take 2 tablets by mouth 2 (Two) Times a Day. 120 tablet 1   • insulin NPH (NovoLIN N) 100 UNIT/ML injection Inject 80 Units under the skin into the appropriate area as directed Every Night. 30 mL 12   • Insulin Syringe 30G X 1/2\" 0.5 ML misc 80 Units Every Night. 90 each 3   • metoprolol succinate XL (TOPROL-XL) 50 MG 24 hr tablet TAKE 1 TABLET BY MOUTH DAILY 90 tablet 1   • ReliOn Insulin Syringe 31G X 15/64\" 1 ML misc INJECT 80 ONCE DAILY AT NIGHT       Current Facility-Administered Medications on File Prior to Visit   Medication Dose Route Frequency Provider Last Rate Last Dose   • cyanocobalamin injection 1,000 mcg  1,000 mcg Intramuscular Q28 Days Hayes Sharp MD   1,000 mcg at 07/22/20 1727       Allergies   Allergen Reactions   • Sulfa Antibiotics Dizziness       Review of Systems   Constitutional: Negative for fatigue, unexpected weight gain and unexpected weight loss.   HENT: Negative for nosebleeds.    Eyes: Negative for visual disturbance.   Respiratory: Negative for shortness of breath.    Cardiovascular: Negative for chest pain, palpitations and leg swelling.   Gastrointestinal: Negative for blood in stool, nausea and indigestion.   Endocrine: Negative for polyphagia and polyuria.   Genitourinary: Negative for frequency and hematuria.   Musculoskeletal: Negative for myalgias.   Skin: Negative for dry skin and skin lesions.   Neurological: Negative for syncope, numbness and headache.       Objective   Visit Vitals  /80 (BP Location: Left arm, Patient Position: Sitting, Cuff Size: Large " "Adult)   Pulse 87   Temp 97.5 °F (36.4 °C) (Temporal)   Resp 18   Ht 177.8 cm (70\")   Wt 114 kg (250 lb 12.8 oz)   SpO2 96%   BMI 35.99 kg/m²     Physical Exam  Vitals signs and nursing note reviewed.   Constitutional:       Appearance: He is well-developed.   HENT:      Head: Normocephalic.   Neck:      Musculoskeletal: Neck supple.      Thyroid: No thyromegaly.      Vascular: No carotid bruit.      Trachea: Trachea normal.   Cardiovascular:      Rate and Rhythm: Normal rate and regular rhythm.      Heart sounds: No murmur. No friction rub. No gallop.    Pulmonary:      Effort: Pulmonary effort is normal. No respiratory distress.      Breath sounds: Normal breath sounds. No wheezing.   Chest:      Chest wall: No tenderness.   Skin:     General: Skin is dry.      Findings: No rash.      Nails: There is no clubbing.     Neurological:      Mental Status: He is alert and oriented to person, place, and time.   Psychiatric:         Behavior: Behavior is cooperative.           Assessment/Plan .  Problem List Items Addressed This Visit        Medium    Hyperlipidemia    Current Assessment & Plan     Labs done 10-, read by me, reviewed with pt.  Trig.93 down from 228, Tot. Chol. 119 down from 154, HDL 34 samew as last, LDL 67 down from 74.  Improved.  Not at goal  Encouraged to watch fatty intake, exercise more, and lose weight.   Compliant with medication.  Is not getting adequate diet and exercise  Goals developed at last visit were not met because diet and exercise  Follow up in 3  months  Care management needs are self-addressed.  Self-management abilities addressed and patient is capable of managing his own disease.           Relevant Medications    lovastatin (MEVACOR) 40 MG tablet    Other Relevant Orders    Lipid Panel With / Chol / HDL Ratio    Comprehensive Metabolic Panel    Hypertension, benign    Current Assessment & Plan     Borderline poor control.  He tolerates his lisinopril and metoprolol quite well " without side effects benefits of the drugs outweigh the risk.  Encouraged to watch salt, exercise more and lose weight.           Relevant Medications    lisinopril (PRINIVIL,ZESTRIL) 20 MG tablet    Microcytic anemia    Current Assessment & Plan     Resolved.  Labs done 10-, read by me, reviewed with pt. CBC was normal         Renal insufficiency, mild    Current Assessment & Plan     Improved.  Labs done 10-, read by me, reviewed with pt.  Creatinine was 1.33 down from 1.39, EGFR was 57 up from 54         Uncontrolled diabetes mellitus (CMS/[facility])    Current Assessment & Plan     Labs done 10-, read by me, reviewed with pt.  A1c was8.5 down from 11.1  Improved, pt. Advised to increase Insulin by 2 units up to a max of 80 units.  Encouraged to watch sugar intake, exercise more and lose weight.   Compliant with medication.   Monitoring sugar at home.   Follow up in 3 months  Care management needs are self-addressed.  Self-management abilities addressed and patient is capable of managing his own disease.           Relevant Medications    Empagliflozin (Jardiance) 10 MG tablet    metFORMIN (GLUCOPHAGE) 1000 MG tablet    Other Relevant Orders    Hemoglobin A1c       Low    Elevated liver function tests    Current Assessment & Plan     Doing well.  Labs done 10-, read by me, reviewed with pt.  AST and ALT was normal         Hyperkalemia - Primary    Current Assessment & Plan     Doing well.  Labs done 10-, read by me, reviewed with pt.  Potassium was 5.0 same as last         Vitamin B12 deficiency    Overview                Current Assessment & Plan     Worse.  Labs done 10-, read by me, reviewed with pt.  Vit. B12 was 227 up from 206.  Advised to have monthly B12 injections.         Relevant Medications    cyanocobalamin injection 1,000 mcg    Other Relevant Orders    Vitamin B12       Unprioritized    Elevated BUN    Current Assessment & Plan     Improved  Labs done 10-,  read by me, reviewed with pt.  BUN was 28 down from 30

## 2020-11-02 NOTE — ASSESSMENT & PLAN NOTE
Worse.  Labs done 10-, read by me, reviewed with pt.  Vit. B12 was 227 up from 206.  Advised to have monthly B12 injections.

## 2020-11-02 NOTE — ASSESSMENT & PLAN NOTE
Labs done 10-, read by me, reviewed with pt.  A1c was8.5 down from 11.1  Improved, pt. Advised to increase Insulin by 2 units up to a max of 80 units.  Encouraged to watch sugar intake, exercise more and lose weight.   Compliant with medication.   Monitoring sugar at home.   Follow up in 3 months  Care management needs are self-addressed.  Self-management abilities addressed and patient is capable of managing his own disease.

## 2020-11-02 NOTE — ASSESSMENT & PLAN NOTE
Improved.  Labs done 10-, read by me, reviewed with pt.  Creatinine was 1.33 down from 1.39, EGFR was 57 up from 54

## 2020-11-05 ENCOUNTER — OFFICE VISIT (OUTPATIENT)
Dept: FAMILY MEDICINE CLINIC | Facility: CLINIC | Age: 60
End: 2020-11-05

## 2020-11-05 VITALS
HEART RATE: 91 BPM | SYSTOLIC BLOOD PRESSURE: 154 MMHG | RESPIRATION RATE: 15 BRPM | HEIGHT: 70 IN | WEIGHT: 248.8 LBS | DIASTOLIC BLOOD PRESSURE: 76 MMHG | TEMPERATURE: 97.5 F | BODY MASS INDEX: 35.62 KG/M2 | OXYGEN SATURATION: 95 %

## 2020-11-05 DIAGNOSIS — J01.11 ACUTE RECURRENT FRONTAL SINUSITIS: Primary | ICD-10-CM

## 2020-11-05 DIAGNOSIS — R05.9 COUGH: ICD-10-CM

## 2020-11-05 DIAGNOSIS — J40 BRONCHITIS: ICD-10-CM

## 2020-11-05 PROBLEM — J32.1 FRONTAL SINUSITIS: Status: ACTIVE | Noted: 2020-11-05

## 2020-11-05 PROCEDURE — 99213 OFFICE O/P EST LOW 20 MIN: CPT | Performed by: FAMILY MEDICINE

## 2020-11-05 RX ORDER — AMOXICILLIN 500 MG/1
500 TABLET, FILM COATED ORAL 3 TIMES DAILY
Qty: 30 TABLET | Refills: 0 | Status: SHIPPED | OUTPATIENT
Start: 2020-11-05 | End: 2021-01-27

## 2020-11-05 NOTE — ASSESSMENT & PLAN NOTE
Worsening; Will test for Covid19 and call patient with the test results.  Patient informed to quarantine until test results are available.

## 2020-11-05 NOTE — PROGRESS NOTES
Subjective   Thor Crouch is a 60 y.o. male.     URI   This is a new problem. The current episode started in the past 7 days. The problem has been gradually worsening. There has been no fever. Associated symptoms include congestion, coughing, ear pain (pressure), sinus pain and a sore throat. Pertinent negatives include no nausea or vomiting. He has tried decongestant for the symptoms. The treatment provided mild relief.        The following portions of the patient's history were reviewed and updated as appropriate: current medications, past family history, past medical history, past social history, past surgical history and problem list.    Family History   Problem Relation Age of Onset   • Arthritis Mother    • Heart disease Mother         ischemic   • Goiter Father    • Heart disease Father         ischemic   • Heart attack Father    • Diabetes Sister         diabetes mellitus   • Diabetes Brother         diabetes mellitus   • Diabetes Maternal Uncle         diabetes mellitus   • Diabetes Paternal Grandmother         diabetes mellitus   • Stroke Paternal Grandfather    • Kidney failure Other         Uncle   • Arthritis Other         grandmother       Social History     Tobacco Use   • Smoking status: Never Smoker   • Smokeless tobacco: Never Used   Substance Use Topics   • Alcohol use: No     Frequency: Never   • Drug use: No       Past Surgical History:   Procedure Laterality Date   • PROSTATE SURGERY         Patient Active Problem List   Diagnosis   • Adult general medical examination   • Cataract of both eyes   • Diabetic nephropathy associated with type 2 diabetes mellitus (CMS/HCC)   • Epiretinal membrane, left   • Family history of ischemic heart disease   • History of prostate cancer   • Hyperlipidemia   • Hypertension, benign   • Left atrial enlargement   • Obstructive sleep apnea   • Overweight   • Renal insufficiency, mild   • Seasonal allergic rhinitis due to pollen   • Situational stress   • Type 2  "diabetes mellitus with both eyes affected by mild nonproliferative retinopathy without macular edema, without long-term current use of insulin (CMS/HCC)   • Vitamin B12 deficiency   • Microcytic anemia   • Uncontrolled diabetes mellitus (CMS/HCC)   • Elevated liver function tests   • Hyperkalemia   • Myalgia   • Stroke with cerebral ischemia (CMS/HCC)   • Malignant neoplasm of prostate (CMS/HCC)   • Neoplasm, uncertain whether benign or malignant   • Elevated BUN   • Frontal sinusitis   • Bronchitis   • Cough       Current Outpatient Medications on File Prior to Visit   Medication Sig Dispense Refill   • Accu-Chek FastClix Lancets misc Check blood sugar 3 x daily 102 each 5   • clopidogrel (PLAVIX) 75 MG tablet TAKE 1 TABLET BY MOUTH DAILY 90 tablet 1   • Empagliflozin (Jardiance) 10 MG tablet Take 10 mg by mouth Daily. 90 tablet 1   • Flonase Allergy Relief 50 MCG/ACT nasal spray SHAKE LIQUID AND USE 2 SPRAYS IN EACH NOSTRIL DAILY 3 bottle 0   • glucose blood (Accu-Chek Guide) test strip 1 each by Other route 3 (Three) Times a Day. 100 each 12   • glyburide (DIAbeta) 5 MG tablet Take 2 tablets by mouth 2 (Two) Times a Day. 120 tablet 1   • insulin NPH (NovoLIN N) 100 UNIT/ML injection Inject 80 Units under the skin into the appropriate area as directed Every Night. 30 mL 12   • Insulin Syringe 30G X 1/2\" 0.5 ML misc 80 Units Every Night. 90 each 3   • lisinopril (PRINIVIL,ZESTRIL) 20 MG tablet Take 1 tablet by mouth Daily. 90 tablet 1   • lovastatin (MEVACOR) 40 MG tablet Take 1 tablet by mouth Every Evening. 90 tablet 1   • metFORMIN (GLUCOPHAGE) 1000 MG tablet Take 1 tablet by mouth 2 (Two) Times a Day. 60 tablet 5   • metoprolol succinate XL (TOPROL-XL) 50 MG 24 hr tablet TAKE 1 TABLET BY MOUTH DAILY 90 tablet 1   • ReliOn Insulin Syringe 31G X 15/64\" 1 ML misc INJECT 80 ONCE DAILY AT NIGHT       Current Facility-Administered Medications on File Prior to Visit   Medication Dose Route Frequency Provider Last Rate " "Last Dose   • cyanocobalamin injection 1,000 mcg  1,000 mcg Intramuscular Q28 Days Hayes Sharp MD   1,000 mcg at 07/22/20 1727   • cyanocobalamin injection 1,000 mcg  1,000 mcg Intramuscular Q28 Days Hayes Sharp MD   1,000 mcg at 11/02/20 1725       Allergies   Allergen Reactions   • Sulfa Antibiotics Dizziness       Review of Systems   HENT: Positive for congestion, ear pain (pressure), sinus pressure and sore throat.    Respiratory: Positive for cough. Negative for shortness of breath.    Gastrointestinal: Negative for nausea and vomiting.       Objective   Visit Vitals  /76 (BP Location: Left arm, Patient Position: Sitting, Cuff Size: Large Adult)   Pulse 91   Temp 97.5 °F (36.4 °C)   Resp 15   Ht 177.8 cm (70\")   Wt 113 kg (248 lb 12.8 oz)   SpO2 95%   BMI 35.70 kg/m²     Physical Exam  Vitals signs and nursing note reviewed.   Constitutional:       Appearance: He is well-developed.   HENT:      Head: Normocephalic.      Right Ear: Tympanic membrane, ear canal and external ear normal. No middle ear effusion. There is no impacted cerumen.      Left Ear: Tympanic membrane, ear canal and external ear normal.  No middle ear effusion. There is no impacted cerumen.      Nose: No septal deviation, mucosal edema or congestion.      Right Sinus: No maxillary sinus tenderness or frontal sinus tenderness.      Left Sinus: No maxillary sinus tenderness or frontal sinus tenderness.      Mouth/Throat:      Mouth: No oral lesions.      Pharynx: No oropharyngeal exudate.      Tonsils: No tonsillar abscesses.   Neck:      Musculoskeletal: Neck supple.      Thyroid: No thyromegaly.      Vascular: No carotid bruit.      Trachea: Trachea normal.   Cardiovascular:      Rate and Rhythm: Normal rate and regular rhythm.      Heart sounds: No murmur. No friction rub. No gallop.    Pulmonary:      Effort: Pulmonary effort is normal. No respiratory distress.      Breath sounds: Normal breath sounds. No wheezing.   Chest: "      Chest wall: No tenderness.   Skin:     General: Skin is dry.      Findings: No rash.      Nails: There is no clubbing.     Neurological:      Mental Status: He is alert and oriented to person, place, and time.   Psychiatric:         Behavior: Behavior is cooperative.           Assessment/Plan .  Problem List Items Addressed This Visit        Unprioritized    Bronchitis    Current Assessment & Plan     Worsening; Advised to start Amoxicillin 500mg TID for 10 days         Cough    Current Assessment & Plan     Worsening; Will test for Covid19 and call patient with the test results.  Patient informed to quarantine until test results are available.         Relevant Orders    COVID-19,[facility] ROUTINE, NP/OP SWAB IN TRANSPORT MEDIA OR ESWAB 72 HR TAT - Swab, Anterior nasal (Completed)    Frontal sinusitis - Primary    Current Assessment & Plan     Worsening; Advised to start Amoxicillin 500mg TID for 10 days.

## 2020-11-08 LAB — SARS-COV-2 RNA RESP QL NAA+PROBE: NOT DETECTED

## 2020-11-08 RX ORDER — SYRINGE AND NEEDLE,INSULIN,1ML 31GX15/64"
SYRINGE, EMPTY DISPOSABLE MISCELLANEOUS
COMMUNITY
Start: 2020-10-15 | End: 2021-08-25 | Stop reason: SDUPTHER

## 2020-11-28 DIAGNOSIS — E11.641 UNCONTROLLED TYPE 2 DIABETES MELLITUS WITH HYPOGLYCEMIA AND COMA (HCC): ICD-10-CM

## 2020-11-28 DIAGNOSIS — E08.649 DIABETES MELLITUS DUE TO UNDERLYING CONDITION, UNCONTROLLED, WITH HYPOGLYCEMIA WITHOUT COMA (HCC): ICD-10-CM

## 2020-12-09 DIAGNOSIS — E08.649 DIABETES MELLITUS DUE TO UNDERLYING CONDITION, UNCONTROLLED, WITH HYPOGLYCEMIA WITHOUT COMA (HCC): ICD-10-CM

## 2020-12-09 DIAGNOSIS — E11.641 UNCONTROLLED TYPE 2 DIABETES MELLITUS WITH HYPOGLYCEMIA AND COMA (HCC): ICD-10-CM

## 2021-01-27 ENCOUNTER — OFFICE VISIT (OUTPATIENT)
Dept: NEUROLOGY | Facility: CLINIC | Age: 61
End: 2021-01-27

## 2021-01-27 VITALS
HEART RATE: 79 BPM | WEIGHT: 254 LBS | BODY MASS INDEX: 36.36 KG/M2 | SYSTOLIC BLOOD PRESSURE: 145 MMHG | HEIGHT: 70 IN | DIASTOLIC BLOOD PRESSURE: 76 MMHG | TEMPERATURE: 97.1 F

## 2021-01-27 DIAGNOSIS — E66.09 CLASS 2 OBESITY DUE TO EXCESS CALORIES WITH BODY MASS INDEX (BMI) OF 36.0 TO 36.9 IN ADULT, UNSPECIFIED WHETHER SERIOUS COMORBIDITY PRESENT: ICD-10-CM

## 2021-01-27 DIAGNOSIS — G47.33 OBSTRUCTIVE SLEEP APNEA: Primary | ICD-10-CM

## 2021-01-27 PROCEDURE — 99213 OFFICE O/P EST LOW 20 MIN: CPT | Performed by: PSYCHIATRY & NEUROLOGY

## 2021-01-27 NOTE — PROGRESS NOTES
Sleep medicine follow-up visit    Thor Crouch   1960  60 y.o. male   DATE OF SERVICE: 1/27/2021     Yearly f/u for CPAP compliance, doing well with pap therapy. Pt. uses nasal pillows and goes through [facility] for supplies.     SLEEP TESTING HISTORY:    On NPSG at [facility] , 10/12/2018 patient had Moderate obstructive sleep apnea syndrome with apnea-hypopnea index of 17.2 per sleep hour, minimum SpO2 of 82%    The compliance data reviewed and the patient is on CPAP therapy at 8-14 cm/H2O and compliance data indicates excellent compliance with 100% usage for more than 4 hours with an average usage of 8 hours 39 minutes. AHI down to 3.0 with CPAP therapy and mean CPAP pressure 8.8 cm of water.      The patient's hypersomnia also resolved with [place] Sleepiness Scale score of 4 with CPAP therapy.  The patient feels great and is benefiting from it and is compliant.     History of stroke, unchanged pt. still taking current medication.  no new symptoms    Obesity BMI-36.4    EPWORTH SLEEPINESS SCALE  Sitting and reading 1 WatchingTV 1  Sitting, inactive, in a public place 0  As a passenger in a car for 1 hour w/o a break  0  Lying down to rest in the afternoon  2  Sitting and talking to someone  0  Sitting quietly after a lunch  0  In a car, while stopped for traffic or a light  0  Total 4    Review of Systems   Constitutional: Negative for appetite change and fatigue.   HENT: Negative for sinus pressure and sinus pain.    Eyes: Negative for pain and itching.   Respiratory: Positive for apnea. Negative for cough and shortness of breath.    Gastrointestinal: Negative for constipation and diarrhea.   Endocrine: Negative for cold intolerance and heat intolerance.   Genitourinary: Negative for difficulty urinating and frequency.   Musculoskeletal: Negative for back pain and neck pain.   Allergic/Immunologic: Negative for environmental allergies and food allergies.   Neurological: Negative for dizziness, tremors, seizures,  syncope, facial asymmetry, speech difficulty, weakness, light-headedness, numbness and headaches.   Psychiatric/Behavioral: Negative for agitation and confusion.     I reviewed and addressed ROS entered by MA.    The following portions of the patient's history were reviewed and updated as appropriate: allergies, current medications, past family history, past medical history, past social history, past surgical history and problem list.      Family History   Problem Relation Age of Onset   • Arthritis Mother    • Heart disease Mother         ischemic   • Goiter Father    • Heart disease Father         ischemic   • Heart attack Father    • Diabetes Sister         diabetes mellitus   • Diabetes Brother         diabetes mellitus   • Diabetes Maternal Uncle         diabetes mellitus   • Diabetes Paternal Grandmother         diabetes mellitus   • Stroke Paternal Grandfather    • Kidney failure Other         Uncle   • Arthritis Other         grandmother       Past Medical History:   Diagnosis Date   • Cataract of both eyes     Other cataract of both eyes; Impression: Stable   • Diabetic nephropathy associated with type 2 diabetes mellitus (CMS/HCC)     Impression: Protein normal 06/18, will continue to repeat.   • Epiretinal membrane, left     Impression: Followed with Dr. Murcia   • History of prostate cancer     Impression: Patient has a follow up appt with Dr. Taylor NP today in [place], discussed with patient about transferring to Dr. Li or Michelle, so he can see them in the [place] office.   • Hypertension, benign     Impression: Good control; Encouraged to watch salt, exercise more and lose weight   • Left atrial enlargement     Impression: Stable, will follow conservatively   • Malignant neoplasm of prostate (CMS/HCC)     Impression: Doing well. Followed with Dr. Washington   • Microcytic anemia     Impression: cbc with next labs   • Overweight     >25   • Renal insufficiency, mild     Impression: new dx for  chart--worse creat up to 1.29 from 1.25   • Seasonal allergic rhinitis due to pollen     Impression: Doing well   • Situational stress     Impression: Stable   • Skin lesion     Unspecified Skin Lesion; Impression: Will obtain notes from [facility].   • Stroke with cerebral ischemia (CMS/HCC)     Impression: Doing well at this time. Will obtain records from Dr. Seipel, regarding medication Plavix.   • Type 2 diabetes mellitus with both eyes affected by mild nonproliferative retinopathy without macular edema, without long-term current use of insulin (CMS/[facility])     Impression: Uncertain control; Advised patient to start monitoring blood sugar at home since taking OTC cough/cold medications.   • Vitamin B12 deficiency     Impression: Patient was supposed to recieve a B12 injection, however patient left before it was done. Patient was called to come back to get this injection.       Social History     Socioeconomic History   • Marital status:      Spouse name: Not on file   • Number of children: Not on file   • Years of education: Not on file   • Highest education level: Not on file   Tobacco Use   • Smoking status: Never Smoker   • Smokeless tobacco: Never Used   Substance and Sexual Activity   • Alcohol use: No     Frequency: Never   • Drug use: No   • Sexual activity: Defer   Social History Narrative     1 x in 1979.  No children at home.  Works at [employer] in [place] has been there since 1977, mild stress.  42.5 hours weekly.  Caffeine none.  Always wears seatbelts, No exercise.  Hobbies none.         Current Outpatient Medications:   •  Accu-Chek FastClix Lancets misc, Check blood sugar 3 x daily, Disp: 102 each, Rfl: 5  •  clopidogrel (PLAVIX) 75 MG tablet, TAKE 1 TABLET BY MOUTH DAILY, Disp: 90 tablet, Rfl: 1  •  Empagliflozin (Jardiance) 10 MG tablet, Take 10 mg by mouth Daily., Disp: 90 tablet, Rfl: 1  •  Flonase Allergy Relief 50 MCG/ACT nasal spray, SHAKE LIQUID AND USE 2 SPRAYS IN EACH  "NOSTRIL DAILY, Disp: 3 bottle, Rfl: 0  •  glucose blood (Accu-Chek Guide) test strip, 1 each by Other route 3 (Three) Times a Day., Disp: 100 each, Rfl: 12  •  glyburide (DIAbeta) 5 MG tablet, Take 2 tablets by mouth 2 (Two) Times a Day., Disp: 120 tablet, Rfl: 1  •  insulin NPH (NovoLIN N) 100 UNIT/ML injection, Inject 80 Units under the skin into the appropriate area as directed Every Night., Disp: 30 mL, Rfl: 12  •  Insulin Syringe 30G X 1/2\" 0.5 ML misc, 80 Units Every Night., Disp: 90 each, Rfl: 3  •  lisinopril (PRINIVIL,ZESTRIL) 20 MG tablet, Take 1 tablet by mouth Daily., Disp: 90 tablet, Rfl: 1  •  lovastatin (MEVACOR) 40 MG tablet, Take 1 tablet by mouth Every Evening., Disp: 90 tablet, Rfl: 1  •  metFORMIN (GLUCOPHAGE) 1000 MG tablet, Take 1 tablet by mouth 2 (Two) Times a Day., Disp: 60 tablet, Rfl: 5  •  metoprolol succinate XL (TOPROL-XL) 50 MG 24 hr tablet, TAKE 1 TABLET BY MOUTH DAILY, Disp: 90 tablet, Rfl: 1  •  ReliOn Insulin Syringe 31G X 15/64\" 1 ML misc, INJECT 80 ONCE DAILY AT NIGHT, Disp: , Rfl:     Current Facility-Administered Medications:   •  cyanocobalamin injection 1,000 mcg, 1,000 mcg, Intramuscular, Q28 Days, Hayes Sharp MD, 1,000 mcg at 07/22/20 1727  •  cyanocobalamin injection 1,000 mcg, 1,000 mcg, Intramuscular, Q28 Days, Hayes Sharp MD, 1,000 mcg at 11/02/20 1725    Allergies   Allergen Reactions   • Sulfa Antibiotics Dizziness        PHYSICAL EXAMINATION:  Vitals:    01/27/21 1548   BP: 145/76   Pulse: 79   Temp: 97.1 °F (36.2 °C)      Body mass index is 36.45 kg/m².       HEENT: Normal.      EXTREMITIES: No edema.     Diagnoses and all orders for this visit:    1. Obstructive sleep apnea (Primary)    2. Class 2 obesity due to excess calories with body mass index (BMI) of 36.0 to 36.9 in adult, unspecified whether serious comorbidity present      Pt doing  Well with CPAP  Will increase  Pressure to min 9     Pt encouraged to lose weight            This document has " been electronically signed by Joseph Seipel, MD on January 27, 2021 16:22 EST

## 2021-02-07 LAB
ALBUMIN SERPL-MCNC: 4.1 G/DL (ref 3.8–4.8)
ALBUMIN/GLOB SERPL: 1.6 {RATIO} (ref 1.2–2.2)
ALP SERPL-CCNC: 85 IU/L (ref 39–117)
ALT SERPL-CCNC: 41 IU/L (ref 0–44)
AST SERPL-CCNC: 25 IU/L (ref 0–40)
BILIRUB SERPL-MCNC: 0.3 MG/DL (ref 0–1.2)
BUN SERPL-MCNC: 30 MG/DL (ref 8–27)
BUN/CREAT SERPL: 24 (ref 10–24)
CALCIUM SERPL-MCNC: 9.6 MG/DL (ref 8.6–10.2)
CHLORIDE SERPL-SCNC: 104 MMOL/L (ref 96–106)
CHOLEST SERPL-MCNC: 139 MG/DL (ref 100–199)
CHOLEST/HDLC SERPL: 4.1 RATIO (ref 0–5)
CO2 SERPL-SCNC: 24 MMOL/L (ref 20–29)
CREAT SERPL-MCNC: 1.25 MG/DL (ref 0.76–1.27)
GLOBULIN SER CALC-MCNC: 2.6 G/DL (ref 1.5–4.5)
GLUCOSE SERPL-MCNC: 133 MG/DL (ref 65–99)
HBA1C MFR BLD: 8.2 % (ref 4.8–5.6)
HDLC SERPL-MCNC: 34 MG/DL
LDLC SERPL CALC-MCNC: 85 MG/DL (ref 0–99)
POTASSIUM SERPL-SCNC: 4.8 MMOL/L (ref 3.5–5.2)
PROT SERPL-MCNC: 6.7 G/DL (ref 6–8.5)
SODIUM SERPL-SCNC: 141 MMOL/L (ref 134–144)
TRIGL SERPL-MCNC: 108 MG/DL (ref 0–149)
VIT B12 SERPL-MCNC: 313 PG/ML (ref 232–1245)
VLDLC SERPL CALC-MCNC: 20 MG/DL (ref 5–40)

## 2021-02-08 ENCOUNTER — OFFICE VISIT (OUTPATIENT)
Dept: FAMILY MEDICINE CLINIC | Facility: CLINIC | Age: 61
End: 2021-02-08

## 2021-02-08 VITALS
RESPIRATION RATE: 16 BRPM | HEART RATE: 89 BPM | SYSTOLIC BLOOD PRESSURE: 144 MMHG | OXYGEN SATURATION: 97 % | DIASTOLIC BLOOD PRESSURE: 78 MMHG | WEIGHT: 257.4 LBS | BODY MASS INDEX: 36.85 KG/M2 | HEIGHT: 70 IN | TEMPERATURE: 97.3 F

## 2021-02-08 DIAGNOSIS — E16.2 HYPOGLYCEMIA: ICD-10-CM

## 2021-02-08 DIAGNOSIS — E13.65 UNCONTROLLED OTHER SPECIFIED DIABETES MELLITUS WITH HYPERGLYCEMIA (HCC): ICD-10-CM

## 2021-02-08 DIAGNOSIS — I51.7 LEFT ATRIAL ENLARGEMENT: ICD-10-CM

## 2021-02-08 DIAGNOSIS — E53.8 VITAMIN B12 DEFICIENCY: ICD-10-CM

## 2021-02-08 DIAGNOSIS — E78.2 MIXED HYPERLIPIDEMIA: ICD-10-CM

## 2021-02-08 DIAGNOSIS — I10 HYPERTENSION, BENIGN: ICD-10-CM

## 2021-02-08 DIAGNOSIS — N28.9 RENAL INSUFFICIENCY, MILD: ICD-10-CM

## 2021-02-08 DIAGNOSIS — I10 HYPERTENSION, BENIGN: Primary | ICD-10-CM

## 2021-02-08 PROCEDURE — 99214 OFFICE O/P EST MOD 30 MIN: CPT | Performed by: FAMILY MEDICINE

## 2021-02-08 PROCEDURE — 96372 THER/PROPH/DIAG INJ SC/IM: CPT | Performed by: FAMILY MEDICINE

## 2021-02-08 RX ORDER — CYANOCOBALAMIN 1000 UG/ML
1000 INJECTION, SOLUTION INTRAMUSCULAR; SUBCUTANEOUS ONCE
Status: COMPLETED | OUTPATIENT
Start: 2021-02-08 | End: 2021-02-09

## 2021-02-08 NOTE — PROGRESS NOTES
Subjective   Thor Crouch is a 60 y.o. male.     Diabetes  He presents for his follow-up diabetic visit. He has type 2 diabetes mellitus. His disease course has been improving. There are no hypoglycemic associated symptoms. There are no diabetic associated symptoms. Pertinent negatives for diabetes include no chest pain, no fatigue, no polyphagia, no polyuria and no weight loss. There are no hypoglycemic complications. There are no diabetic complications. Risk factors for coronary artery disease include dyslipidemia, diabetes mellitus and hypertension.   Hyperlipidemia  This is a chronic problem. The current episode started more than 1 year ago. The problem is controlled. Exacerbating diseases include diabetes. Pertinent negatives include no chest pain, myalgias or shortness of breath. Current antihyperlipidemic treatment includes statins. Risk factors for coronary artery disease include diabetes mellitus, dyslipidemia and hypertension.   Hypertension  This is a chronic problem. The current episode started more than 1 year ago. The problem has been gradually improving since onset. The problem is controlled. Pertinent negatives include no chest pain, palpitations or shortness of breath. Risk factors for coronary artery disease include diabetes mellitus and dyslipidemia. Current antihypertension treatment includes beta blockers.        The following portions of the patient's history were reviewed and updated as appropriate: current medications, past family history, past medical history, past social history, past surgical history and problem list.    Family History   Problem Relation Age of Onset   • Arthritis Mother    • Heart disease Mother         ischemic   • Goiter Father    • Heart disease Father         ischemic   • Heart attack Father    • Diabetes Sister         diabetes mellitus   • Diabetes Brother         diabetes mellitus   • Diabetes Maternal Uncle         diabetes mellitus   • Diabetes Paternal Grandmother          diabetes mellitus   • Stroke Paternal Grandfather    • Kidney failure Other         Uncle   • Arthritis Other         grandmother       Social History     Tobacco Use   • Smoking status: Never Smoker   • Smokeless tobacco: Never Used   Substance Use Topics   • Alcohol use: No     Frequency: Never   • Drug use: No       Past Surgical History:   Procedure Laterality Date   • PROSTATE SURGERY         Patient Active Problem List   Diagnosis   • Adult general medical examination   • Cataract of both eyes   • Diabetic nephropathy associated with type 2 diabetes mellitus (CMS/HCC)   • Epiretinal membrane, left   • Family history of ischemic heart disease   • History of prostate cancer   • Hyperlipidemia   • Hypertension, benign   • Left atrial enlargement   • Obstructive sleep apnea   • Overweight   • Renal insufficiency, mild   • Seasonal allergic rhinitis due to pollen   • Situational stress   • Type 2 diabetes mellitus with both eyes affected by mild nonproliferative retinopathy without macular edema, without long-term current use of insulin (CMS/HCC)   • Vitamin B12 deficiency   • Microcytic anemia   • Uncontrolled diabetes mellitus (CMS/HCC)   • Elevated liver function tests   • Hyperkalemia   • Myalgia   • Stroke with cerebral ischemia (CMS/HCC)   • Malignant neoplasm of prostate (CMS/HCC)   • Neoplasm, uncertain whether benign or malignant   • Elevated BUN   • Frontal sinusitis   • Cough   • Hypoglycemia       Current Outpatient Medications on File Prior to Visit   Medication Sig Dispense Refill   • Accu-Chek FastClix Lancets misc Check blood sugar 3 x daily 102 each 5   • Empagliflozin (Jardiance) 10 MG tablet Take 10 mg by mouth Daily. 90 tablet 1   • Flonase Allergy Relief 50 MCG/ACT nasal spray SHAKE LIQUID AND USE 2 SPRAYS IN EACH NOSTRIL DAILY 3 bottle 0   • glucose blood (Accu-Chek Guide) test strip 1 each by Other route 3 (Three) Times a Day. 100 each 12   • insulin NPH (NovoLIN N) 100 UNIT/ML  "injection Inject 80 Units under the skin into the appropriate area as directed Every Night. 30 mL 12   • Insulin Syringe 30G X 1/2\" 0.5 ML misc 80 Units Every Night. 90 each 3   • metFORMIN (GLUCOPHAGE) 1000 MG tablet Take 1 tablet by mouth 2 (Two) Times a Day. 60 tablet 5   • ReliOn Insulin Syringe 31G X 15/64\" 1 ML misc INJECT 80 ONCE DAILY AT NIGHT     • glyburide (DIAbeta) 5 MG tablet Take 2 tablets by mouth 2 (Two) Times a Day. 120 tablet 1     Current Facility-Administered Medications on File Prior to Visit   Medication Dose Route Frequency Provider Last Rate Last Admin   • cyanocobalamin injection 1,000 mcg  1,000 mcg Intramuscular Q28 Days Hayes Sharp MD   1,000 mcg at 07/22/20 1727   • cyanocobalamin injection 1,000 mcg  1,000 mcg Intramuscular Q28 Days Hayes Sharp MD   1,000 mcg at 11/02/20 1725       Allergies   Allergen Reactions   • Sulfa Antibiotics Dizziness       Review of Systems   Constitutional: Negative for fatigue, unexpected weight gain and unexpected weight loss.   Eyes: Negative for visual disturbance.   Respiratory: Negative for shortness of breath.    Cardiovascular: Negative for chest pain, palpitations and leg swelling.   Gastrointestinal: Negative for nausea.   Endocrine: Negative for polyphagia and polyuria.   Genitourinary: Negative for frequency.   Musculoskeletal: Negative for myalgias.   Skin: Negative for dry skin and skin lesions.   Neurological: Negative for syncope, numbness and headache.       Objective   Visit Vitals  /78 (BP Location: Right arm, Patient Position: Sitting, Cuff Size: Large Adult)   Pulse 89   Temp 97.3 °F (36.3 °C)   Resp 16   Ht 177.8 cm (70\")   Wt 117 kg (257 lb 6.4 oz)   SpO2 97%   BMI 36.93 kg/m²     Physical Exam  Vitals signs and nursing note reviewed.   Constitutional:       Appearance: He is well-developed.   HENT:      Head: Normocephalic.   Neck:      Musculoskeletal: Neck supple.      Thyroid: No thyromegaly.      Vascular: No " carotid bruit.      Trachea: Trachea normal.   Cardiovascular:      Rate and Rhythm: Normal rate and regular rhythm.      Heart sounds: No murmur. No friction rub. No gallop.    Pulmonary:      Effort: Pulmonary effort is normal. No respiratory distress.      Breath sounds: Normal breath sounds. No wheezing.   Chest:      Chest wall: No tenderness.   Skin:     General: Skin is dry.      Findings: No rash.      Nails: There is no clubbing.     Neurological:      Mental Status: He is alert and oriented to person, place, and time.   Psychiatric:         Behavior: Behavior is cooperative.           Assessment/Plan .  Problem List Items Addressed This Visit        Medium    Hyperlipidemia    Current Assessment & Plan     Slightly worse; -Chol 139 up from 119, Trig 108 up from 93, HDL 34-same, LDL-85 up from 67  Encouraged to watch fatty intake, exercise more, and lose weight.   compliant with medication  Patient tolerated lovastatin well without side effects. I feel the benefits of the medication outweigh the risks.    Is not getting adequate diet and exercise  Goals developed at last visit were not met   Follow up in 3  months  Care management needs are self-addressed. . Self-management abilities addressed and patient is capable of managing his own disease.           Relevant Orders    Lipid Panel With / Chol / HDL Ratio    Comprehensive Metabolic Panel    Hypertension, benign - Primary    Current Assessment & Plan     Borderline poor control; Encouraged to watch salt, exercise more and lose weight.  Patient tolerated toprol well without side effects. I feel the benefits of the medication outweigh the risks.           Renal insufficiency, mild    Current Assessment & Plan     Improved with GFR up to 61 from 37         Uncontrolled diabetes mellitus (CMS/[facility])    Current Assessment & Plan     Improved,. Hgb a1c 8.2 down from 8.5.  Discussed adding a short acting insulin but will hold on that for now due to episodes of  hypoglycemia.     Encouraged to watch sugar intake, exercise more and lose weight.   compliant with medication. Patient tolerated Novolin,metformin well without side effects. I feel the benefits of the medication outweigh the risks.   monitoring sugar at home.   Follow up in 3 months  Care management needs are self-addressed.  Self-management abilities addressed and patient is capable of managing his own disease.           Relevant Orders    Hemoglobin A1c       Low    Vitamin B12 deficiency    Overview                Current Assessment & Plan     Will give a Vitamin B12 injection today.             Unprioritized    Hypoglycemia    Current Assessment & Plan     Patient states that he has had probably 5 episodes of hypoglycemia, which usually occurs early in the morning. Discussed with jean and decided not to change any medications at this time.

## 2021-02-08 NOTE — ASSESSMENT & PLAN NOTE
Improved,. Hgb a1c 8.2 down from 8.5.  Discussed adding a short acting insulin but will hold on that for now due to episodes of hypoglycemia.     Encouraged to watch sugar intake, exercise more and lose weight.   compliant with medication. Patient tolerated Novolin,metformin well without side effects. I feel the benefits of the medication outweigh the risks.   monitoring sugar at home.   Follow up in 3 months  Care management needs are self-addressed.  Self-management abilities addressed and patient is capable of managing his own disease.

## 2021-02-08 NOTE — ASSESSMENT & PLAN NOTE
Slightly worse; -Chol 139 up from 119, Trig 108 up from 93, HDL 34-same, LDL-85 up from 67  Encouraged to watch fatty intake, exercise more, and lose weight.   compliant with medication  Patient tolerated lovastatin well without side effects. I feel the benefits of the medication outweigh the risks.    Is not getting adequate diet and exercise  Goals developed at last visit were not met   Follow up in 3  months  Care management needs are self-addressed. . Self-management abilities addressed and patient is capable of managing his own disease.

## 2021-02-08 NOTE — ASSESSMENT & PLAN NOTE
Borderline poor control; Encouraged to watch salt, exercise more and lose weight.  Patient tolerated toprol well without side effects. I feel the benefits of the medication outweigh the risks.

## 2021-02-09 PROBLEM — E16.2 HYPOGLYCEMIA: Status: ACTIVE | Noted: 2021-02-09

## 2021-02-09 RX ORDER — LISINOPRIL 20 MG/1
20 TABLET ORAL DAILY
Qty: 90 TABLET | Refills: 1 | Status: SHIPPED | OUTPATIENT
Start: 2021-02-09 | End: 2021-08-25 | Stop reason: SDUPTHER

## 2021-02-09 RX ORDER — METOPROLOL SUCCINATE 50 MG/1
50 TABLET, EXTENDED RELEASE ORAL DAILY
Qty: 90 TABLET | Refills: 1 | Status: SHIPPED | OUTPATIENT
Start: 2021-02-09 | End: 2021-08-25 | Stop reason: SDUPTHER

## 2021-02-09 RX ORDER — CLOPIDOGREL BISULFATE 75 MG/1
75 TABLET ORAL DAILY
Qty: 90 TABLET | Refills: 1 | Status: SHIPPED | OUTPATIENT
Start: 2021-02-09 | End: 2021-08-25 | Stop reason: SDUPTHER

## 2021-02-09 RX ORDER — LOVASTATIN 40 MG/1
40 TABLET ORAL EVERY EVENING
Qty: 90 TABLET | Refills: 1
Start: 2021-02-09 | End: 2021-04-21

## 2021-02-09 RX ADMIN — CYANOCOBALAMIN 1000 MCG: 1000 INJECTION, SOLUTION INTRAMUSCULAR; SUBCUTANEOUS at 09:56

## 2021-04-20 DIAGNOSIS — E78.2 MIXED HYPERLIPIDEMIA: ICD-10-CM

## 2021-04-21 RX ORDER — LOVASTATIN 40 MG/1
40 TABLET ORAL EVERY EVENING
Qty: 90 TABLET | Refills: 1 | Status: SHIPPED | OUTPATIENT
Start: 2021-04-21 | End: 2021-08-25 | Stop reason: SDUPTHER

## 2021-05-09 LAB
ALBUMIN SERPL-MCNC: 4.3 G/DL (ref 3.8–4.8)
ALBUMIN/GLOB SERPL: 1.7 {RATIO} (ref 1.2–2.2)
ALP SERPL-CCNC: 78 IU/L (ref 39–117)
ALT SERPL-CCNC: 25 IU/L (ref 0–44)
AST SERPL-CCNC: 17 IU/L (ref 0–40)
BILIRUB SERPL-MCNC: 0.4 MG/DL (ref 0–1.2)
BUN SERPL-MCNC: 24 MG/DL (ref 8–27)
BUN/CREAT SERPL: 17 (ref 10–24)
CALCIUM SERPL-MCNC: 9.8 MG/DL (ref 8.6–10.2)
CHLORIDE SERPL-SCNC: 104 MMOL/L (ref 96–106)
CHOLEST SERPL-MCNC: 146 MG/DL (ref 100–199)
CHOLEST/HDLC SERPL: 3.9 RATIO (ref 0–5)
CO2 SERPL-SCNC: 25 MMOL/L (ref 20–29)
CREAT SERPL-MCNC: 1.4 MG/DL (ref 0.76–1.27)
GLOBULIN SER CALC-MCNC: 2.5 G/DL (ref 1.5–4.5)
GLUCOSE SERPL-MCNC: 106 MG/DL (ref 65–99)
HBA1C MFR BLD: 8.3 % (ref 4.8–5.6)
HDLC SERPL-MCNC: 37 MG/DL
LDLC SERPL CALC-MCNC: 88 MG/DL (ref 0–99)
POTASSIUM SERPL-SCNC: 5 MMOL/L (ref 3.5–5.2)
PROT SERPL-MCNC: 6.8 G/DL (ref 6–8.5)
SODIUM SERPL-SCNC: 142 MMOL/L (ref 134–144)
TRIGL SERPL-MCNC: 114 MG/DL (ref 0–149)
VLDLC SERPL CALC-MCNC: 21 MG/DL (ref 5–40)

## 2021-05-10 ENCOUNTER — OFFICE VISIT (OUTPATIENT)
Dept: FAMILY MEDICINE CLINIC | Facility: CLINIC | Age: 61
End: 2021-05-10

## 2021-05-10 VITALS
DIASTOLIC BLOOD PRESSURE: 81 MMHG | WEIGHT: 254.4 LBS | HEART RATE: 90 BPM | RESPIRATION RATE: 18 BRPM | SYSTOLIC BLOOD PRESSURE: 148 MMHG | BODY MASS INDEX: 36.42 KG/M2 | OXYGEN SATURATION: 96 % | TEMPERATURE: 97.7 F | HEIGHT: 70 IN

## 2021-05-10 DIAGNOSIS — E11.641 UNCONTROLLED TYPE 2 DIABETES MELLITUS WITH HYPOGLYCEMIA AND COMA (HCC): ICD-10-CM

## 2021-05-10 DIAGNOSIS — E13.65 UNCONTROLLED OTHER SPECIFIED DIABETES MELLITUS WITH HYPERGLYCEMIA (HCC): Primary | ICD-10-CM

## 2021-05-10 DIAGNOSIS — R79.89 ELEVATED LIVER FUNCTION TESTS: ICD-10-CM

## 2021-05-10 DIAGNOSIS — E16.2 HYPOGLYCEMIA: ICD-10-CM

## 2021-05-10 DIAGNOSIS — E78.2 MIXED HYPERLIPIDEMIA: ICD-10-CM

## 2021-05-10 DIAGNOSIS — E08.649 DIABETES MELLITUS DUE TO UNDERLYING CONDITION, UNCONTROLLED, WITH HYPOGLYCEMIA WITHOUT COMA (HCC): ICD-10-CM

## 2021-05-10 DIAGNOSIS — E87.5 HYPERKALEMIA: ICD-10-CM

## 2021-05-10 DIAGNOSIS — J30.1 SEASONAL ALLERGIC RHINITIS DUE TO POLLEN: ICD-10-CM

## 2021-05-10 DIAGNOSIS — N18.31 CHRONIC RENAL FAILURE, STAGE 3A (HCC): ICD-10-CM

## 2021-05-10 DIAGNOSIS — I10 HYPERTENSION, BENIGN: ICD-10-CM

## 2021-05-10 PROBLEM — N18.30 CHRONIC RENAL FAILURE, STAGE 3 (MODERATE): Status: ACTIVE | Noted: 2019-07-01

## 2021-05-10 PROCEDURE — 99214 OFFICE O/P EST MOD 30 MIN: CPT | Performed by: FAMILY MEDICINE

## 2021-05-10 RX ORDER — EMPAGLIFLOZIN 10 MG/1
1 TABLET, FILM COATED ORAL DAILY
Qty: 90 TABLET | Refills: 1 | Status: SHIPPED | OUTPATIENT
Start: 2021-05-10 | End: 2021-08-25 | Stop reason: SDUPTHER

## 2021-05-10 RX ORDER — FLUTICASONE PROPIONATE 50 MCG
2 SPRAY, SUSPENSION (ML) NASAL DAILY
Qty: 16 G | Refills: 3 | Status: SHIPPED | OUTPATIENT
Start: 2021-05-10 | End: 2022-09-14 | Stop reason: SDUPTHER

## 2021-05-10 RX ORDER — HUMAN INSULIN 100 [IU]/ML
100 INJECTION, SUSPENSION SUBCUTANEOUS NIGHTLY
Qty: 30 ML | Refills: 12 | Status: SHIPPED | OUTPATIENT
Start: 2021-05-10 | End: 2021-08-02 | Stop reason: SDUPTHER

## 2021-05-10 NOTE — ASSESSMENT & PLAN NOTE
Worse.  Labs done 5-8-2021, read by me, reviewed with pt.  Creatinine was 1.40 up from 1.25, EGFR was 53 down from 61.  Discussed following with Nephrologist or repeating cmp in 3 months, pt. Prefers to repeat CMP in 3 months.

## 2021-05-10 NOTE — ASSESSMENT & PLAN NOTE
Borderline poor control.  Patient tolerated Lisinopril and Metoprolol well without side effects. I feel the benefits of the medication outweigh the risks.  Encouraged to watch salt, exercise more and lose weight.

## 2021-05-10 NOTE — PROGRESS NOTES
Subjective   Thor Crouch is a 60 y.o. male.   Chief Complaint   Patient presents with   • Diabetes   • Hyperlipidemia   • Hypertension     Diabetes  He presents for his follow-up diabetic visit. He has type 2 diabetes mellitus. His disease course has been worsening. There are no hypoglycemic associated symptoms. There are no diabetic associated symptoms. Pertinent negatives for diabetes include no chest pain, no fatigue, no polyphagia, no polyuria and no weight loss. There are no hypoglycemic complications. There are no diabetic complications. Risk factors for coronary artery disease include diabetes mellitus, dyslipidemia and hypertension. Current diabetic treatment includes insulin injections and oral agent (monotherapy). He is compliant with treatment all of the time. He never participates in exercise. He does not see a podiatrist.Eye exam is not current.   Hyperlipidemia  This is a chronic problem. The current episode started more than 1 year ago. The problem is controlled. Recent lipid tests were reviewed and are high. Exacerbating diseases include diabetes. Factors aggravating his hyperlipidemia include fatty foods. Pertinent negatives include no chest pain, myalgias or shortness of breath. Current antihyperlipidemic treatment includes statins. Compliance problems include adherence to exercise.  Risk factors for coronary artery disease include diabetes mellitus, dyslipidemia and hypertension.   Hypertension  This is a chronic problem. The current episode started more than 1 year ago. The problem is unchanged. The problem is controlled. Pertinent negatives include no chest pain, palpitations or shortness of breath. Risk factors for coronary artery disease include dyslipidemia and diabetes mellitus. The current treatment provides mild improvement. Compliance problems include exercise.         The following portions of the patient's history were reviewed and updated as appropriate: allergies, current medications,  past family history, past medical history, past social history, past surgical history and problem list.    Past Medical History:   Diagnosis Date   • Cataract of both eyes     Other cataract of both eyes; Impression: Stable   • Diabetic nephropathy associated with type 2 diabetes mellitus (CMS/HCC)     Impression: Protein normal 06/18, will continue to repeat.   • Epiretinal membrane, left     Impression: Followed with Dr. Murcia   • History of prostate cancer     Impression: Patient has a follow up appt with Dr. Taylor NP today in [place], discussed with patient about transferring to Dr. Li or Michelle, so he can see them in the [place] office.   • Hypertension, benign     Impression: Good control; Encouraged to watch salt, exercise more and lose weight   • Left atrial enlargement     Impression: Stable, will follow conservatively   • Malignant neoplasm of prostate (CMS/HCC)     Impression: Doing well. Followed with Dr. Washington   • Microcytic anemia     Impression: cbc with next labs   • Overweight     >25   • Renal insufficiency, mild     Impression: new dx for chart--worse creat up to 1.29 from 1.25   • Seasonal allergic rhinitis due to pollen     Impression: Doing well   • Situational stress     Impression: Stable   • Skin lesion     Unspecified Skin Lesion; Impression: Will obtain notes from [facility].   • Stroke with cerebral ischemia (CMS/HCC)     Impression: Doing well at this time. Will obtain records from Dr. Seipel, regarding medication Plavix.   • Type 2 diabetes mellitus with both eyes affected by mild nonproliferative retinopathy without macular edema, without long-term current use of insulin (CMS/HCC)     Impression: Uncertain control; Advised patient to start monitoring blood sugar at home since taking OTC cough/cold medications.   • Vitamin B12 deficiency     Impression: Patient was supposed to recieve a B12 injection, however patient left before it was done. Patient was called  "to come back to get this injection.       Past Surgical History:   Procedure Laterality Date   • PROSTATE SURGERY          Family History   Problem Relation Age of Onset   • Arthritis Mother    • Heart disease Mother         ischemic   • Goiter Father    • Heart disease Father         ischemic   • Heart attack Father    • Diabetes Sister         diabetes mellitus   • Diabetes Brother         diabetes mellitus   • Diabetes Maternal Uncle         diabetes mellitus   • Diabetes Paternal Grandmother         diabetes mellitus   • Stroke Paternal Grandfather    • Kidney failure Other         Uncle   • Arthritis Other         grandmother        Social History     Socioeconomic History   • Marital status:      Spouse name: Not on file   • Number of children: Not on file   • Years of education: Not on file   • Highest education level: Not on file   Tobacco Use   • Smoking status: Never Smoker   • Smokeless tobacco: Never Used   Substance and Sexual Activity   • Alcohol use: No   • Drug use: No   • Sexual activity: Defer         Review of Systems   Constitutional: Negative for fatigue, unexpected weight gain and unexpected weight loss.   Eyes: Negative for visual disturbance.   Respiratory: Negative for shortness of breath.    Cardiovascular: Negative for chest pain, palpitations and leg swelling.   Gastrointestinal: Negative for nausea.   Endocrine: Negative for polyphagia and polyuria.   Genitourinary: Negative for frequency.   Musculoskeletal: Negative for myalgias.   Skin: Negative for dry skin and skin lesions.   Neurological: Negative for syncope, numbness and headache.       Objective   Visit Vitals  /81 (BP Location: Right arm, Patient Position: Sitting, Cuff Size: Adult)   Pulse 90   Temp 97.7 °F (36.5 °C) (Temporal)   Resp 18   Ht 177.8 cm (70\")   Wt 115 kg (254 lb 6.4 oz)   SpO2 96%   BMI 36.50 kg/m²     Physical Exam  Vitals and nursing note reviewed.   Constitutional:       Appearance: He is " well-developed.   HENT:      Head: Normocephalic.   Neck:      Thyroid: No thyromegaly.      Vascular: No carotid bruit.      Trachea: Trachea normal.   Cardiovascular:      Rate and Rhythm: Normal rate and regular rhythm.      Heart sounds: No murmur heard.   No friction rub. No gallop.    Pulmonary:      Effort: Pulmonary effort is normal. No respiratory distress.      Breath sounds: Normal breath sounds. No wheezing.   Chest:      Chest wall: No tenderness.   Musculoskeletal:      Cervical back: Neck supple.   Skin:     General: Skin is dry.      Findings: No rash.      Nails: There is no clubbing.   Neurological:      Mental Status: He is alert and oriented to person, place, and time.   Psychiatric:         Behavior: Behavior is cooperative.         Assessment/Plan   Problem List Items Addressed This Visit        Medium    Chronic renal failure, stage 3 (moderate) (CMS/HCC)    Current Assessment & Plan     Worse.  Labs done 5-8-2021, read by me, reviewed with pt.  Creatinine was 1.40 up from 1.25, EGFR was 53 down from 61.  Discussed following with Nephrologist or repeating cmp in 3 months, pt. Prefers to repeat CMP in 3 months.         Hyperlipidemia    Current Assessment & Plan     Lipid and CMP reviewed with patient--labs done 5-8-2021, read by me, reviewed with pt.  Trig. 114 up from 108, Tot. Chol. 146 up from 139, HDL 37 up from 34, LDL 88 up from 85  Slightly Improved. Patient tolerated Lovastatin well without side effects. I feel the benefits of the medication outweigh the risks.  Encouraged to watch fatty intake, exercise more, and lose weight.   Compliant with medication    Is not getting adequate diet and exercise  Goals developed at last visit were not met because diet and exercise  Follow up in 3  months  Care management needs are self-addressed.  Self-management abilities addressed and patient is capable of managing his own disease.           Hypertension, benign    Current Assessment & Plan      Borderline poor control.  Patient tolerated Lisinopril and Metoprolol well without side effects. I feel the benefits of the medication outweigh the risks.  Encouraged to watch salt, exercise more and lose weight.           Uncontrolled diabetes mellitus (CMS/HCC) - Primary    Current Assessment & Plan     Patient increased is not feeling in on his own from 80 to 90 units has gotten much better control with this in the last week or 2.  Labs done 5-8-2021, read by me, reviewed with pt.  a1c was 8.3 up from 8.2.  Increase NovoLIN N by 2 units until at a max of 100 units.  He has no episodes of hypoglycemia below blood sugars get low he may gradually titrate down to 80 event.  Worsening.  Patient tolerated Metformin, Jardiance and Novolin N well without side effects. I feel the benefits of the medication outweigh the risks.  Encouraged to watch sugar intake, exercise more and lose weight.   Compliant with medication.   Monitoring sugar at home.  Blood sugars reviewed and scanned to chart.  Patient encouraged to see me back within a month he prefers to wait and to follow-up in 3 months.  Follow up in 3 months  Care management needs are self-addressed. Self-management abilities addressed and patient is capable of managing his own disease.           Relevant Orders    Microalbumin / Creatinine Urine Ratio - Urine, Clean Catch       Unprioritized    Hypoglycemia    Current Assessment & Plan     No recurrance           Other Visit Diagnoses     Elevated liver function tests        resolved    Hyperkalemia        resolved

## 2021-05-10 NOTE — ASSESSMENT & PLAN NOTE
Patient increased is not feeling in on his own from 80 to 90 units has gotten much better control with this in the last week or 2.  Labs done 5-8-2021, read by me, reviewed with pt.  a1c was 8.3 up from 8.2.  Increase NovoLIN N by 2 units until at a max of 100 units.  He has no episodes of hypoglycemia below blood sugars get low he may gradually titrate down to 80 event.  Worsening.  Patient tolerated Metformin, Jardiance and Novolin N well without side effects. I feel the benefits of the medication outweigh the risks.  Encouraged to watch sugar intake, exercise more and lose weight.   Compliant with medication.   Monitoring sugar at home.  Blood sugars reviewed and scanned to chart.  Patient encouraged to see me back within a month he prefers to wait and to follow-up in 3 months.  Follow up in 3 months  Care management needs are self-addressed. Self-management abilities addressed and patient is capable of managing his own disease.

## 2021-05-10 NOTE — ASSESSMENT & PLAN NOTE
Lipid and CMP reviewed with patient--labs done 5-8-2021, read by me, reviewed with pt.  Trig. 114 up from 108, Tot. Chol. 146 up from 139, HDL 37 up from 34, LDL 88 up from 85  Slightly Improved. Patient tolerated Lovastatin well without side effects. I feel the benefits of the medication outweigh the risks.  Encouraged to watch fatty intake, exercise more, and lose weight.   Compliant with medication    Is not getting adequate diet and exercise  Goals developed at last visit were not met because diet and exercise  Follow up in 3  months  Care management needs are self-addressed.  Self-management abilities addressed and patient is capable of managing his own disease.

## 2021-06-23 DIAGNOSIS — E11.641 UNCONTROLLED TYPE 2 DIABETES MELLITUS WITH HYPOGLYCEMIA AND COMA (HCC): ICD-10-CM

## 2021-06-24 RX ORDER — SYRINGE AND NEEDLE,INSULIN,1ML 31GX15/64"
SYRINGE, EMPTY DISPOSABLE MISCELLANEOUS
Qty: 90 EACH | Refills: 0 | Status: SHIPPED | OUTPATIENT
Start: 2021-06-24 | End: 2022-09-14 | Stop reason: SDUPTHER

## 2021-08-02 DIAGNOSIS — E11.641 UNCONTROLLED TYPE 2 DIABETES MELLITUS WITH HYPOGLYCEMIA AND COMA (HCC): ICD-10-CM

## 2021-08-02 RX ORDER — HUMAN INSULIN 100 [IU]/ML
100 INJECTION, SUSPENSION SUBCUTANEOUS NIGHTLY
Qty: 30 ML | Refills: 12 | Status: SHIPPED | OUTPATIENT
Start: 2021-08-02 | End: 2021-08-02

## 2021-08-02 RX ORDER — HUMAN INSULIN 100 [IU]/ML
INJECTION, SUSPENSION SUBCUTANEOUS
Qty: 30 ML | Refills: 0 | Status: SHIPPED | OUTPATIENT
Start: 2021-08-02 | End: 2021-08-25 | Stop reason: SDUPTHER

## 2021-08-18 ENCOUNTER — OFFICE VISIT (OUTPATIENT)
Dept: FAMILY MEDICINE CLINIC | Facility: CLINIC | Age: 61
End: 2021-08-18

## 2021-08-18 VITALS
WEIGHT: 257.4 LBS | HEIGHT: 70 IN | TEMPERATURE: 97.7 F | RESPIRATION RATE: 18 BRPM | DIASTOLIC BLOOD PRESSURE: 82 MMHG | BODY MASS INDEX: 36.85 KG/M2 | OXYGEN SATURATION: 96 % | SYSTOLIC BLOOD PRESSURE: 163 MMHG | HEART RATE: 71 BPM

## 2021-08-18 DIAGNOSIS — R53.83 LETHARGY: ICD-10-CM

## 2021-08-18 DIAGNOSIS — Z85.46 HISTORY OF PROSTATE CANCER: ICD-10-CM

## 2021-08-18 DIAGNOSIS — E13.65 UNCONTROLLED OTHER SPECIFIED DIABETES MELLITUS WITH HYPERGLYCEMIA (HCC): ICD-10-CM

## 2021-08-18 DIAGNOSIS — E53.8 VITAMIN B12 DEFICIENCY: ICD-10-CM

## 2021-08-18 DIAGNOSIS — E78.2 MIXED HYPERLIPIDEMIA: Primary | ICD-10-CM

## 2021-08-18 DIAGNOSIS — R35.1 NOCTURIA: ICD-10-CM

## 2021-08-18 DIAGNOSIS — D50.9 MICROCYTIC ANEMIA: ICD-10-CM

## 2021-08-18 DIAGNOSIS — I51.7 LEFT ATRIAL ENLARGEMENT: ICD-10-CM

## 2021-08-18 PROCEDURE — 99213 OFFICE O/P EST LOW 20 MIN: CPT | Performed by: FAMILY MEDICINE

## 2021-08-18 PROCEDURE — 93000 ELECTROCARDIOGRAM COMPLETE: CPT | Performed by: FAMILY MEDICINE

## 2021-08-18 NOTE — ASSESSMENT & PLAN NOTE
Microalbumin order given to pt. Monofilament foot exam was done and was WNL. No ulcers seen on the feet. Eye exam up to date up  by Dr. Manzo.  Patient encouraged to check blood sugar daily. Encouraged to watch sugar intake, exercise more, lose weight.  Patient given order for fasting labs.  Advised to increase NovoLIN N 40 units in the am and 50 units in the evening.  He previously was having hypoglycemic attacks thus I feel he is not getting a full 24hour spread on the insulin thus will go from daily to twice daily.  Pt. Strongly encouraged to exercise.

## 2021-08-18 NOTE — PROGRESS NOTES
Subjective   Thor Crouch is a 60 y.o. male.   Chief Complaint   Patient presents with   • Heart Problem   • Diabetes     Heart Problem  This is a chronic problem. The current episode started more than 1 year ago. The problem occurs constantly. The problem has been unchanged. Pertinent negatives include no chest pain or joint swelling. Fatigue: mild. Nothing aggravates the symptoms. He has tried nothing for the symptoms. The treatment provided no relief.   Diabetes  He presents for his follow-up diabetic visit. He has type 2 diabetes mellitus. There are no hypoglycemic associated symptoms. There are no diabetic associated symptoms. Pertinent negatives for diabetes include no chest pain. Fatigue: mild. There are no hypoglycemic complications. Risk factors for coronary artery disease include diabetes mellitus, dyslipidemia, hypertension and obesity. Current diabetic treatment includes oral agent (monotherapy) and insulin injections. He has not had a previous visit with a dietitian. He never participates in exercise. Eye exam is current.        The following portions of the patient's history were reviewed and updated as appropriate: allergies, current medications, past family history, past medical history, past social history, past surgical history and problem list.    Past Medical History:   Diagnosis Date   • Cataract of both eyes     Other cataract of both eyes; Impression: Stable   • Diabetic nephropathy associated with type 2 diabetes mellitus (CMS/HCC)     Impression: Protein normal 06/18, will continue to repeat.   • Epiretinal membrane, left     Impression: Followed with Dr. Murcia   • History of prostate cancer     Impression: Patient has a follow up appt with Dr. Taylor NP today in [place], discussed with patient about transferring to Dr. Li or Michelle, so he can see them in the [place] office.   • Hypertension, benign     Impression: Good control; Encouraged to watch salt, exercise more and lose  weight   • Left atrial enlargement     Impression: Stable, will follow conservatively   • Malignant neoplasm of prostate (CMS/HCC)     Impression: Doing well. Followed with Dr. Washington   • Microcytic anemia     Impression: cbc with next labs   • Overweight     >25   • Renal insufficiency, mild     Impression: new dx for chart--worse creat up to 1.29 from 1.25   • Seasonal allergic rhinitis due to pollen     Impression: Doing well   • Situational stress     Impression: Stable   • Skin lesion     Unspecified Skin Lesion; Impression: Will obtain notes from [facility].   • Stroke with cerebral ischemia (CMS/HCC)     Impression: Doing well at this time. Will obtain records from Dr. Seipel, regarding medication Plavix.   • Type 2 diabetes mellitus with both eyes affected by mild nonproliferative retinopathy without macular edema, without long-term current use of insulin (CMS/HCC)     Impression: Uncertain control; Advised patient to start monitoring blood sugar at home since taking OTC cough/cold medications.   • Vitamin B12 deficiency     Impression: Patient was supposed to recieve a B12 injection, however patient left before it was done. Patient was called to come back to get this injection.       Past Surgical History:   Procedure Laterality Date   • PROSTATE SURGERY          Family History   Problem Relation Age of Onset   • Arthritis Mother    • Heart disease Mother         ischemic   • Goiter Father    • Heart disease Father         ischemic   • Heart attack Father    • Diabetes Sister         diabetes mellitus   • Diabetes Brother         diabetes mellitus   • Diabetes Maternal Uncle         diabetes mellitus   • Diabetes Paternal Grandmother         diabetes mellitus   • Stroke Paternal Grandfather    • Kidney failure Other         Uncle   • Arthritis Other         grandmother        Social History     Socioeconomic History   • Marital status:      Spouse name: Not on file   • Number of  "children: Not on file   • Years of education: Not on file   • Highest education level: Not on file   Tobacco Use   • Smoking status: Never Smoker   • Smokeless tobacco: Never Used   Substance and Sexual Activity   • Alcohol use: No   • Drug use: No   • Sexual activity: Defer         Review of Systems   Constitutional: Fatigue: mild.   HENT: Negative for hearing loss.    Respiratory: Negative for shortness of breath.    Cardiovascular: Negative for chest pain and palpitations.   Genitourinary: Positive for nocturia (1x nightly). Negative for frequency.        Nocturia   Musculoskeletal: Negative for joint swelling.   Neurological: Negative for memory problem.       Objective   Visit Vitals  /82 (BP Location: Right arm, Patient Position: Sitting, Cuff Size: Large Adult)   Pulse 71   Temp 97.7 °F (36.5 °C) (Temporal)   Resp 18   Ht 177.8 cm (70\")   Wt 117 kg (257 lb 6.4 oz)   SpO2 96%   BMI 36.93 kg/m²     Physical Exam  Vitals and nursing note reviewed.   Constitutional:       Appearance: He is well-developed.   HENT:      Head: Normocephalic.   Neck:      Thyroid: No thyromegaly.      Vascular: No carotid bruit.      Trachea: Trachea normal.   Cardiovascular:      Rate and Rhythm: Normal rate and regular rhythm.      Heart sounds: No murmur heard.   No friction rub. No gallop.    Pulmonary:      Effort: Pulmonary effort is normal. No respiratory distress.      Breath sounds: Normal breath sounds. No wheezing.   Chest:      Chest wall: No tenderness.   Musculoskeletal:      Cervical back: Neck supple.   Skin:     General: Skin is dry.      Findings: No rash.      Nails: There is no clubbing.   Neurological:      Mental Status: He is alert and oriented to person, place, and time.   Psychiatric:         Behavior: Behavior is cooperative.         Assessment/Plan   Problem List Items Addressed This Visit        High    History of prostate cancer    Current Assessment & Plan     Discussed taking over management of " PSA's, per pt. request.            Medium    Hyperlipidemia - Primary    Current Assessment & Plan     Patient given order for fasting labs.         Relevant Orders    Lipid Panel With / Chol / HDL Ratio    Comprehensive metabolic panel    Microcytic anemia    Current Assessment & Plan     Patient given order for fasting labs.         Relevant Orders    CBC & Differential    Uncontrolled diabetes mellitus (CMS/HCC)    Current Assessment & Plan     Microalbumin order given to pt. Monofilament foot exam was done and was WNL. No ulcers seen on the feet. Eye exam up to date up  by Dr. Manzo.  Patient encouraged to check blood sugar daily. Encouraged to watch sugar intake, exercise more, lose weight.  Patient given order for fasting labs.  Advised NovoLIN N 40 units in the am and 50 units in the evening.  Pt. Strongly encouraged to exercise.         Relevant Orders    Hemoglobin A1c    Microalbumin / Creatinine Urine Ratio - Urine, Clean Catch       Low    Left atrial enlargement    Current Assessment & Plan     EKG done today, read by me, reviewed with pt.  EKG showed NSR with no abnormalities         Relevant Orders    ECG 12 Lead    Vitamin B12 deficiency    Overview                Current Assessment & Plan     Patient given order for fasting labs.         Relevant Orders    Vitamin B12       Unprioritized    Lethargy    Current Assessment & Plan     Patient given order for fasting labs.         Relevant Orders    TSH    Nocturia    Current Assessment & Plan     Pt. Given order for U/A         Relevant Orders    Urinalysis without microscopic (no culture) - Urine, Clean Catch

## 2021-08-24 LAB
ALBUMIN SERPL-MCNC: 4.3 G/DL (ref 3.8–4.8)
ALBUMIN/CREAT UR: 45 MG/G CREAT (ref 0–29)
ALBUMIN/GLOB SERPL: 1.7 {RATIO} (ref 1.2–2.2)
ALP SERPL-CCNC: 77 IU/L (ref 48–121)
ALT SERPL-CCNC: 24 IU/L (ref 0–44)
APPEARANCE UR: CLEAR
AST SERPL-CCNC: 18 IU/L (ref 0–40)
BASOPHILS # BLD AUTO: 0.1 X10E3/UL (ref 0–0.2)
BASOPHILS NFR BLD AUTO: 1 %
BILIRUB SERPL-MCNC: 0.3 MG/DL (ref 0–1.2)
BILIRUB UR QL STRIP: NEGATIVE
BUN SERPL-MCNC: 32 MG/DL (ref 8–27)
BUN/CREAT SERPL: 23 (ref 10–24)
CALCIUM SERPL-MCNC: 9.4 MG/DL (ref 8.6–10.2)
CHLORIDE SERPL-SCNC: 105 MMOL/L (ref 96–106)
CHOLEST SERPL-MCNC: 142 MG/DL (ref 100–199)
CHOLEST/HDLC SERPL: 4.1 RATIO (ref 0–5)
CO2 SERPL-SCNC: 24 MMOL/L (ref 20–29)
COLOR UR: YELLOW
CREAT SERPL-MCNC: 1.38 MG/DL (ref 0.76–1.27)
CREAT UR-MCNC: 114.9 MG/DL
EOSINOPHIL # BLD AUTO: 0.3 X10E3/UL (ref 0–0.4)
EOSINOPHIL NFR BLD AUTO: 5 %
ERYTHROCYTE [DISTWIDTH] IN BLOOD BY AUTOMATED COUNT: 14.1 % (ref 11.6–15.4)
GLOBULIN SER CALC-MCNC: 2.5 G/DL (ref 1.5–4.5)
GLUCOSE SERPL-MCNC: 109 MG/DL (ref 65–99)
GLUCOSE UR QL: ABNORMAL
HBA1C MFR BLD: 7.9 % (ref 4.8–5.6)
HCT VFR BLD AUTO: 46.4 % (ref 37.5–51)
HDLC SERPL-MCNC: 35 MG/DL
HGB BLD-MCNC: 14.7 G/DL (ref 13–17.7)
HGB UR QL STRIP: NEGATIVE
IMM GRANULOCYTES # BLD AUTO: 0 X10E3/UL (ref 0–0.1)
IMM GRANULOCYTES NFR BLD AUTO: 0 %
KETONES UR QL STRIP: NEGATIVE
LDLC SERPL CALC-MCNC: 86 MG/DL (ref 0–99)
LEUKOCYTE ESTERASE UR QL STRIP: NEGATIVE
LYMPHOCYTES # BLD AUTO: 1.5 X10E3/UL (ref 0.7–3.1)
LYMPHOCYTES NFR BLD AUTO: 22 %
MCH RBC QN AUTO: 27.1 PG (ref 26.6–33)
MCHC RBC AUTO-ENTMCNC: 31.7 G/DL (ref 31.5–35.7)
MCV RBC AUTO: 86 FL (ref 79–97)
MICROALBUMIN UR-MCNC: 52.1 UG/ML
MONOCYTES # BLD AUTO: 0.9 X10E3/UL (ref 0.1–0.9)
MONOCYTES NFR BLD AUTO: 13 %
NEUTROPHILS # BLD AUTO: 4.1 X10E3/UL (ref 1.4–7)
NEUTROPHILS NFR BLD AUTO: 59 %
NITRITE UR QL STRIP: NEGATIVE
PH UR STRIP: 5.5 [PH] (ref 5–7.5)
PLATELET # BLD AUTO: 359 X10E3/UL (ref 150–450)
POTASSIUM SERPL-SCNC: 5.4 MMOL/L (ref 3.5–5.2)
PROT SERPL-MCNC: 6.8 G/DL (ref 6–8.5)
PROT UR QL STRIP: ABNORMAL
RBC # BLD AUTO: 5.42 X10E6/UL (ref 4.14–5.8)
SODIUM SERPL-SCNC: 142 MMOL/L (ref 134–144)
SP GR UR: >=1.03 (ref 1–1.03)
TRIGL SERPL-MCNC: 116 MG/DL (ref 0–149)
TSH SERPL DL<=0.005 MIU/L-ACNC: 1.44 UIU/ML (ref 0.45–4.5)
UROBILINOGEN UR STRIP-MCNC: 0.2 MG/DL (ref 0.2–1)
VIT B12 SERPL-MCNC: 289 PG/ML (ref 232–1245)
VLDLC SERPL CALC-MCNC: 21 MG/DL (ref 5–40)
WBC # BLD AUTO: 6.9 X10E3/UL (ref 3.4–10.8)

## 2021-08-25 ENCOUNTER — OFFICE VISIT (OUTPATIENT)
Dept: FAMILY MEDICINE CLINIC | Facility: CLINIC | Age: 61
End: 2021-08-25

## 2021-08-25 VITALS
RESPIRATION RATE: 18 BRPM | HEIGHT: 70 IN | SYSTOLIC BLOOD PRESSURE: 162 MMHG | OXYGEN SATURATION: 97 % | HEART RATE: 84 BPM | BODY MASS INDEX: 36.88 KG/M2 | TEMPERATURE: 97.7 F | DIASTOLIC BLOOD PRESSURE: 90 MMHG | WEIGHT: 257.6 LBS

## 2021-08-25 DIAGNOSIS — D50.9 MICROCYTIC ANEMIA: ICD-10-CM

## 2021-08-25 DIAGNOSIS — E11.9 TYPE 2 DIABETES MELLITUS WITHOUT COMPLICATION, WITH LONG-TERM CURRENT USE OF INSULIN (HCC): ICD-10-CM

## 2021-08-25 DIAGNOSIS — J30.1 SEASONAL ALLERGIC RHINITIS DUE TO POLLEN: ICD-10-CM

## 2021-08-25 DIAGNOSIS — I63.9 STROKE WITH CEREBRAL ISCHEMIA (HCC): Primary | ICD-10-CM

## 2021-08-25 DIAGNOSIS — E11.21 DIABETIC NEPHROPATHY ASSOCIATED WITH TYPE 2 DIABETES MELLITUS (HCC): ICD-10-CM

## 2021-08-25 DIAGNOSIS — M79.10 MYALGIA: ICD-10-CM

## 2021-08-25 DIAGNOSIS — R35.1 BENIGN PROSTATIC HYPERPLASIA WITH NOCTURIA: ICD-10-CM

## 2021-08-25 DIAGNOSIS — E11.3293 TYPE 2 DIABETES MELLITUS WITH BOTH EYES AFFECTED BY MILD NONPROLIFERATIVE RETINOPATHY WITHOUT MACULAR EDEMA, WITHOUT LONG-TERM CURRENT USE OF INSULIN (HCC): ICD-10-CM

## 2021-08-25 DIAGNOSIS — N18.31 CHRONIC RENAL FAILURE, STAGE 3A (HCC): ICD-10-CM

## 2021-08-25 DIAGNOSIS — E53.8 VITAMIN B12 DEFICIENCY: ICD-10-CM

## 2021-08-25 DIAGNOSIS — G47.33 OBSTRUCTIVE SLEEP APNEA: ICD-10-CM

## 2021-08-25 DIAGNOSIS — H35.372 EPIRETINAL MEMBRANE, LEFT: ICD-10-CM

## 2021-08-25 DIAGNOSIS — I10 HYPERTENSION, BENIGN: ICD-10-CM

## 2021-08-25 DIAGNOSIS — C61 MALIGNANT NEOPLASM OF PROSTATE (HCC): ICD-10-CM

## 2021-08-25 DIAGNOSIS — E66.3 OVERWEIGHT: ICD-10-CM

## 2021-08-25 DIAGNOSIS — Z82.49 FAMILY HISTORY OF ISCHEMIC HEART DISEASE: ICD-10-CM

## 2021-08-25 DIAGNOSIS — E16.2 HYPOGLYCEMIA: ICD-10-CM

## 2021-08-25 DIAGNOSIS — Z79.4 TYPE 2 DIABETES MELLITUS WITHOUT COMPLICATION, WITH LONG-TERM CURRENT USE OF INSULIN (HCC): ICD-10-CM

## 2021-08-25 DIAGNOSIS — E78.2 MIXED HYPERLIPIDEMIA: ICD-10-CM

## 2021-08-25 DIAGNOSIS — D48.9 NEOPLASM, UNCERTAIN WHETHER BENIGN OR MALIGNANT: ICD-10-CM

## 2021-08-25 DIAGNOSIS — F43.9 SITUATIONAL STRESS: ICD-10-CM

## 2021-08-25 DIAGNOSIS — N40.1 BENIGN PROSTATIC HYPERPLASIA WITH NOCTURIA: ICD-10-CM

## 2021-08-25 DIAGNOSIS — Z00.01 ENCOUNTER FOR GENERAL ADULT MEDICAL EXAMINATION WITH ABNORMAL FINDINGS: ICD-10-CM

## 2021-08-25 DIAGNOSIS — I51.7 LEFT ATRIAL ENLARGEMENT: ICD-10-CM

## 2021-08-25 DIAGNOSIS — E87.5 HYPERKALEMIA: ICD-10-CM

## 2021-08-25 DIAGNOSIS — R35.1 NOCTURIA: ICD-10-CM

## 2021-08-25 DIAGNOSIS — H25.093 AGE-RELATED INCIPIENT CATARACT OF BOTH EYES: ICD-10-CM

## 2021-08-25 PROCEDURE — 99396 PREV VISIT EST AGE 40-64: CPT | Performed by: FAMILY MEDICINE

## 2021-08-25 PROCEDURE — 99214 OFFICE O/P EST MOD 30 MIN: CPT | Performed by: FAMILY MEDICINE

## 2021-08-25 RX ORDER — METOPROLOL SUCCINATE 50 MG/1
50 TABLET, EXTENDED RELEASE ORAL DAILY
Qty: 90 TABLET | Refills: 1
Start: 2021-08-25 | End: 2022-02-08 | Stop reason: SDUPTHER

## 2021-08-25 RX ORDER — HUMAN INSULIN 100 [IU]/ML
INJECTION, SUSPENSION SUBCUTANEOUS
Qty: 30 ML | Refills: 0
Start: 2021-08-25 | End: 2021-09-08 | Stop reason: SDUPTHER

## 2021-08-25 RX ORDER — LOVASTATIN 40 MG/1
40 TABLET ORAL EVERY EVENING
Qty: 90 TABLET | Refills: 1 | Status: SHIPPED | OUTPATIENT
Start: 2021-08-25 | End: 2022-02-08 | Stop reason: SDUPTHER

## 2021-08-25 RX ORDER — METOPROLOL SUCCINATE 50 MG/1
50 TABLET, EXTENDED RELEASE ORAL DAILY
Qty: 90 TABLET | Refills: 1 | Status: SHIPPED | OUTPATIENT
Start: 2021-08-25 | End: 2021-08-25 | Stop reason: SDUPTHER

## 2021-08-25 RX ORDER — LISINOPRIL 20 MG/1
20 TABLET ORAL 2 TIMES DAILY
Qty: 180 TABLET | Refills: 3 | Status: SHIPPED | OUTPATIENT
Start: 2021-08-25 | End: 2021-09-08 | Stop reason: SDUPTHER

## 2021-08-25 RX ORDER — CLOPIDOGREL BISULFATE 75 MG/1
75 TABLET ORAL DAILY
Qty: 90 TABLET | Refills: 1 | Status: SHIPPED | OUTPATIENT
Start: 2021-08-25 | End: 2022-02-08 | Stop reason: SDUPTHER

## 2021-08-25 RX ORDER — INSULIN HUMAN 100 [IU]/ML
30 INJECTION, SOLUTION PARENTERAL DAILY
Qty: 10 EACH | Refills: 12 | Status: SHIPPED | OUTPATIENT
Start: 2021-08-25 | End: 2021-09-02

## 2021-08-25 RX ORDER — LANCETS
EACH MISCELLANEOUS
Qty: 102 EACH | Refills: 5 | Status: SHIPPED | OUTPATIENT
Start: 2021-08-25 | End: 2022-05-11 | Stop reason: SDUPTHER

## 2021-08-25 RX ORDER — SYRINGE AND NEEDLE,INSULIN,1ML 31GX15/64"
80 SYRINGE, EMPTY DISPOSABLE MISCELLANEOUS 2 TIMES DAILY
Qty: 100 EACH | Refills: 12 | Status: SHIPPED | OUTPATIENT
Start: 2021-08-25 | End: 2022-09-14 | Stop reason: SDUPTHER

## 2021-08-25 RX ORDER — BLOOD SUGAR DIAGNOSTIC
1 STRIP MISCELLANEOUS 3 TIMES DAILY
Qty: 100 EACH | Refills: 12 | Status: SHIPPED | OUTPATIENT
Start: 2021-08-25 | End: 2022-05-11 | Stop reason: SDUPTHER

## 2021-09-02 DIAGNOSIS — E11.3293 TYPE 2 DIABETES MELLITUS WITH BOTH EYES AFFECTED BY MILD NONPROLIFERATIVE RETINOPATHY WITHOUT MACULAR EDEMA, WITHOUT LONG-TERM CURRENT USE OF INSULIN (HCC): Primary | ICD-10-CM

## 2021-09-02 RX ORDER — HUMAN INSULIN 100 [IU]/ML
30 INJECTION, SOLUTION SUBCUTANEOUS DAILY
Qty: 1 EACH | Refills: 12 | Status: SHIPPED | OUTPATIENT
Start: 2021-09-02 | End: 2022-09-14 | Stop reason: SDUPTHER

## 2021-09-08 DIAGNOSIS — I10 HYPERTENSION, BENIGN: ICD-10-CM

## 2021-09-08 DIAGNOSIS — E11.9 TYPE 2 DIABETES MELLITUS WITHOUT COMPLICATION, WITH LONG-TERM CURRENT USE OF INSULIN (HCC): ICD-10-CM

## 2021-09-08 DIAGNOSIS — Z79.4 TYPE 2 DIABETES MELLITUS WITHOUT COMPLICATION, WITH LONG-TERM CURRENT USE OF INSULIN (HCC): ICD-10-CM

## 2021-09-09 RX ORDER — HUMAN INSULIN 100 [IU]/ML
INJECTION, SUSPENSION SUBCUTANEOUS
Qty: 30 ML | Refills: 12
Start: 2021-09-09 | End: 2022-09-14 | Stop reason: SDUPTHER

## 2021-09-09 RX ORDER — LISINOPRIL 20 MG/1
20 TABLET ORAL 2 TIMES DAILY
Qty: 180 TABLET | Refills: 3 | Status: SHIPPED | OUTPATIENT
Start: 2021-09-09 | End: 2021-09-13 | Stop reason: SDUPTHER

## 2021-09-13 DIAGNOSIS — I10 HYPERTENSION, BENIGN: ICD-10-CM

## 2021-09-13 RX ORDER — LISINOPRIL 40 MG/1
40 TABLET ORAL 2 TIMES DAILY
Qty: 90 TABLET | Refills: 0 | Status: SHIPPED | OUTPATIENT
Start: 2021-09-13 | End: 2021-11-01

## 2021-10-17 ENCOUNTER — DOCUMENTATION (OUTPATIENT)
Dept: FAMILY MEDICINE CLINIC | Facility: CLINIC | Age: 61
End: 2021-10-17

## 2021-10-17 LAB
ALBUMIN SERPL-MCNC: 4.2 G/DL (ref 3.8–4.8)
ALBUMIN/GLOB SERPL: 1.6 {RATIO} (ref 1.2–2.2)
ALP SERPL-CCNC: 75 IU/L (ref 44–121)
ALT SERPL-CCNC: 31 IU/L (ref 0–44)
AST SERPL-CCNC: 21 IU/L (ref 0–40)
BASOPHILS # BLD AUTO: 0.1 X10E3/UL (ref 0–0.2)
BASOPHILS NFR BLD AUTO: 1 %
BILIRUB SERPL-MCNC: 0.4 MG/DL (ref 0–1.2)
BUN SERPL-MCNC: 18 MG/DL (ref 8–27)
BUN/CREAT SERPL: 15 (ref 10–24)
CALCIUM SERPL-MCNC: 9.4 MG/DL (ref 8.6–10.2)
CHLORIDE SERPL-SCNC: 104 MMOL/L (ref 96–106)
CHOLEST SERPL-MCNC: 153 MG/DL (ref 100–199)
CHOLEST/HDLC SERPL: 3.8 RATIO (ref 0–5)
CO2 SERPL-SCNC: 25 MMOL/L (ref 20–29)
CREAT SERPL-MCNC: 1.17 MG/DL (ref 0.76–1.27)
EOSINOPHIL # BLD AUTO: 0.4 X10E3/UL (ref 0–0.4)
EOSINOPHIL NFR BLD AUTO: 6 %
ERYTHROCYTE [DISTWIDTH] IN BLOOD BY AUTOMATED COUNT: 13.2 % (ref 11.6–15.4)
GLOBULIN SER CALC-MCNC: 2.6 G/DL (ref 1.5–4.5)
GLUCOSE SERPL-MCNC: 38 MG/DL (ref 65–99)
HBA1C MFR BLD: 7 % (ref 4.8–5.6)
HCT VFR BLD AUTO: 48.5 % (ref 37.5–51)
HDLC SERPL-MCNC: 40 MG/DL
HGB BLD-MCNC: 15.4 G/DL (ref 13–17.7)
IMM GRANULOCYTES # BLD AUTO: 0 X10E3/UL (ref 0–0.1)
IMM GRANULOCYTES NFR BLD AUTO: 0 %
LDLC SERPL CALC-MCNC: 94 MG/DL (ref 0–99)
LYMPHOCYTES # BLD AUTO: 1.7 X10E3/UL (ref 0.7–3.1)
LYMPHOCYTES NFR BLD AUTO: 22 %
MCH RBC QN AUTO: 27.4 PG (ref 26.6–33)
MCHC RBC AUTO-ENTMCNC: 31.8 G/DL (ref 31.5–35.7)
MCV RBC AUTO: 86 FL (ref 79–97)
MONOCYTES # BLD AUTO: 0.7 X10E3/UL (ref 0.1–0.9)
MONOCYTES NFR BLD AUTO: 10 %
NEUTROPHILS # BLD AUTO: 4.5 X10E3/UL (ref 1.4–7)
NEUTROPHILS NFR BLD AUTO: 61 %
PLATELET # BLD AUTO: 382 X10E3/UL (ref 150–450)
POTASSIUM SERPL-SCNC: 4.3 MMOL/L (ref 3.5–5.2)
PROT SERPL-MCNC: 6.8 G/DL (ref 6–8.5)
RBC # BLD AUTO: 5.62 X10E6/UL (ref 4.14–5.8)
SODIUM SERPL-SCNC: 143 MMOL/L (ref 134–144)
TRIGL SERPL-MCNC: 100 MG/DL (ref 0–149)
VIT B12 SERPL-MCNC: 287 PG/ML (ref 232–1245)
VLDLC SERPL CALC-MCNC: 19 MG/DL (ref 5–40)
WBC # BLD AUTO: 7.5 X10E3/UL (ref 3.4–10.8)

## 2021-10-17 NOTE — PROGRESS NOTES
Attempted to reach patient 9:10 am 10/17/21 regarding blood sugar 38 drawn on labs 10/16/21.  Called home phone.  No answer.  No ability to leave VM.  No new hospital encounters per [facility].  Patient has a f/u appt with Dr. Sharp 11/1.

## 2021-10-20 LAB
Lab: NORMAL
METHYLMALONATE SERPL-SCNC: 201 NMOL/L (ref 0–378)

## 2021-11-01 ENCOUNTER — OFFICE VISIT (OUTPATIENT)
Dept: FAMILY MEDICINE CLINIC | Facility: CLINIC | Age: 61
End: 2021-11-01

## 2021-11-01 VITALS
DIASTOLIC BLOOD PRESSURE: 83 MMHG | RESPIRATION RATE: 18 BRPM | SYSTOLIC BLOOD PRESSURE: 169 MMHG | BODY MASS INDEX: 36.65 KG/M2 | TEMPERATURE: 97.2 F | WEIGHT: 256 LBS | OXYGEN SATURATION: 97 % | HEART RATE: 82 BPM | HEIGHT: 70 IN

## 2021-11-01 DIAGNOSIS — I10 HYPERTENSION, BENIGN: ICD-10-CM

## 2021-11-01 DIAGNOSIS — E87.5 HYPERKALEMIA: ICD-10-CM

## 2021-11-01 DIAGNOSIS — D50.9 MICROCYTIC ANEMIA: ICD-10-CM

## 2021-11-01 DIAGNOSIS — Z79.4 TYPE 2 DIABETES MELLITUS WITHOUT COMPLICATION, WITH LONG-TERM CURRENT USE OF INSULIN (HCC): ICD-10-CM

## 2021-11-01 DIAGNOSIS — E53.8 VITAMIN B12 DEFICIENCY: ICD-10-CM

## 2021-11-01 DIAGNOSIS — E11.9 TYPE 2 DIABETES MELLITUS WITHOUT COMPLICATION, WITH LONG-TERM CURRENT USE OF INSULIN (HCC): ICD-10-CM

## 2021-11-01 DIAGNOSIS — N18.31 CHRONIC RENAL FAILURE, STAGE 3A (HCC): ICD-10-CM

## 2021-11-01 DIAGNOSIS — E78.2 MIXED HYPERLIPIDEMIA: Primary | ICD-10-CM

## 2021-11-01 PROCEDURE — 99214 OFFICE O/P EST MOD 30 MIN: CPT | Performed by: FAMILY MEDICINE

## 2021-11-01 RX ORDER — LISINOPRIL 20 MG/1
20 TABLET ORAL 2 TIMES DAILY
Qty: 60 TABLET | Refills: 5 | Status: SHIPPED | OUTPATIENT
Start: 2021-11-01 | End: 2021-11-02

## 2021-11-01 RX ORDER — LOVASTATIN 10 MG/1
10 TABLET ORAL NIGHTLY
COMMUNITY
End: 2021-11-01 | Stop reason: DRUGHIGH

## 2021-11-01 RX ORDER — AMLODIPINE BESYLATE 5 MG/1
5 TABLET ORAL DAILY
Qty: 30 TABLET | Refills: 5 | Status: SHIPPED | OUTPATIENT
Start: 2021-11-01 | End: 2021-11-02

## 2021-11-01 NOTE — ASSESSMENT & PLAN NOTE
Doing well.  Labs done 10-, read by me, reviewed with pt.  Vit. B12 was 287 down from 289,  MMA was 201 down from 208

## 2021-11-01 NOTE — ASSESSMENT & PLAN NOTE
Lipid and CMP reviewed with patient--labs done 10-, read by me, reviewed with pt.  Trig. 100 down from 116, Tot. Chol. 153 up from 142, HDL 40 up from 35, LDL 94 up from 89.  Improved.   Encouraged to watch fatty intake, exercise more, and lose weight. Compliant with medication.   Patient tolerated Lovastatin well without side effects. I feel the benefits of the medication outweigh the risks.  Is getting adequate diet and exercise  Goals developed at last visit were met.  Follow up in 3 months  Care management needs are self-addressed. Would benefit from care management. Self-management abilities addressed and patient is capable of managing his own disease.

## 2021-11-01 NOTE — PROGRESS NOTES
Subjective   Thor Crouch is a 61 y.o. male.   Chief Complaint   Patient presents with   • Diabetes   • Hypertension   • Hyperlipidemia     Diabetes  He presents for his follow-up diabetic visit. He has type 2 diabetes mellitus. His disease course has been improving. There are no hypoglycemic associated symptoms. There are no diabetic associated symptoms. Pertinent negatives for diabetes include no chest pain, no fatigue, no polyphagia, no polyuria and no weight loss. There are no hypoglycemic complications. There are no diabetic complications. Risk factors for coronary artery disease include diabetes mellitus, dyslipidemia and hypertension. Current diabetic treatment includes insulin injections and oral agent (dual therapy). He has not had a previous visit with a dietitian. He does not see a podiatrist.Eye exam is current.   Hypertension  This is a chronic problem. The current episode started more than 1 year ago. The problem has been gradually worsening since onset. The problem is controlled. Pertinent negatives include no chest pain, palpitations or shortness of breath. Risk factors for coronary artery disease include dyslipidemia and diabetes mellitus. The current treatment provides no improvement.   Hyperlipidemia  This is a chronic problem. The current episode started more than 1 year ago. The problem is controlled. Recent lipid tests were reviewed and are normal. Exacerbating diseases include diabetes. Factors aggravating his hyperlipidemia include fatty foods. Pertinent negatives include no chest pain, myalgias or shortness of breath. Current antihyperlipidemic treatment includes statins. The current treatment provides significant improvement of lipids. There are no compliance problems.  Risk factors for coronary artery disease include diabetes mellitus, hypertension and dyslipidemia.        The following portions of the patient's history were reviewed and updated as appropriate: allergies, current  medications, past family history, past medical history, past social history, past surgical history and problem list.    Past Medical History:   Diagnosis Date   • Cataract of both eyes     Other cataract of both eyes; Impression: Stable   • Diabetic nephropathy associated with type 2 diabetes mellitus (HCC)     Impression: Protein normal 06/18, will continue to repeat.   • Epiretinal membrane, left     Impression: Followed with Dr. Murcia   • History of prostate cancer     Impression: Patient has a follow up appt with Dr. Taylor NP today in [place], discussed with patient about transferring to Dr. Li or Michelle, so he can see them in the [place] office.   • Hypertension, benign     Impression: Good control; Encouraged to watch salt, exercise more and lose weight   • Left atrial enlargement     Impression: Stable, will follow conservatively   • Malignant neoplasm of prostate (HCC)     Impression: Doing well. Followed with Dr. Washington   • Microcytic anemia     Impression: cbc with next labs   • Overweight     >25   • Seasonal allergic rhinitis due to pollen     Impression: Doing well   • Situational stress     Impression: Stable   • Skin lesion     Unspecified Skin Lesion; Impression: Will obtain notes from [facility].   • Stroke with cerebral ischemia (HCC)     Impression: Doing well at this time. Will obtain records from Dr. Seipel, regarding medication Plavix.   • Type 2 diabetes mellitus with both eyes affected by mild nonproliferative retinopathy without macular edema, without long-term current use of insulin (HCC)     Impression: Uncertain control; Advised patient to start monitoring blood sugar at home since taking OTC cough/cold medications.   • Vitamin B12 deficiency     Impression: Patient was supposed to recieve a B12 injection, however patient left before it was done. Patient was called to come back to get this injection.       Past Surgical History:   Procedure Laterality Date   •  "PROSTATE SURGERY          Family History   Problem Relation Age of Onset   • Arthritis Mother    • Heart disease Mother         ischemic   • Goiter Father    • Heart disease Father         ischemic   • Heart attack Father    • Diabetes Sister         diabetes mellitus   • Diabetes Brother         diabetes mellitus   • Diabetes Maternal Uncle         diabetes mellitus   • Diabetes Paternal Grandmother         diabetes mellitus   • Stroke Paternal Grandfather    • Kidney failure Other         Uncle   • Arthritis Other         grandmother        Social History     Socioeconomic History   • Marital status:    Tobacco Use   • Smoking status: Never Smoker   • Smokeless tobacco: Never Used   Substance and Sexual Activity   • Alcohol use: No   • Drug use: No   • Sexual activity: Defer         Review of Systems   Constitutional: Negative for fatigue, unexpected weight gain and unexpected weight loss.   Eyes: Negative for visual disturbance.   Respiratory: Negative for shortness of breath.    Cardiovascular: Negative for chest pain, palpitations and leg swelling.   Gastrointestinal: Negative for nausea.   Endocrine: Negative for polyphagia and polyuria.   Genitourinary: Negative for frequency.   Musculoskeletal: Negative for myalgias.   Skin: Negative for dry skin and skin lesions.   Neurological: Negative for syncope, numbness and headache.       Objective   Visit Vitals  /83 (BP Location: Right arm, Patient Position: Sitting, Cuff Size: Large Adult)   Pulse 82   Temp 97.2 °F (36.2 °C) (Temporal)   Resp 18   Ht 177.8 cm (70\")   Wt 116 kg (256 lb)   SpO2 97%   BMI 36.73 kg/m²     Physical Exam  Vitals and nursing note reviewed.   Constitutional:       Appearance: He is well-developed.   HENT:      Head: Normocephalic.   Neck:      Thyroid: No thyromegaly.      Vascular: No carotid bruit.      Trachea: Trachea normal.   Cardiovascular:      Rate and Rhythm: Normal rate and regular rhythm.      Heart sounds: No " murmur heard.  No friction rub. No gallop.    Pulmonary:      Effort: Pulmonary effort is normal. No respiratory distress.      Breath sounds: Normal breath sounds. No wheezing.   Chest:      Chest wall: No tenderness.   Musculoskeletal:      Cervical back: Neck supple.   Skin:     General: Skin is dry.      Findings: No rash.      Nails: There is no clubbing.   Neurological:      Mental Status: He is alert and oriented to person, place, and time.   Psychiatric:         Behavior: Behavior is cooperative.         Assessment/Plan   Problem List Items Addressed This Visit        Medium    Chronic renal failure, stage 3a (HCC)    Current Assessment & Plan     Resolved.  Labs done 10-, read by me, reviewed with pt.  Creatinine and EGFR was normal         Hyperlipidemia - Primary    Current Assessment & Plan     Lipid and CMP reviewed with patient--labs done 10-, read by me, reviewed with pt.  Trig. 100 down from 116, Tot. Chol. 153 up from 142, HDL 40 up from 35, LDL 94 up from 89.  Improved.   Encouraged to watch fatty intake, exercise more, and lose weight. Compliant with medication.   Patient tolerated Lovastatin well without side effects. I feel the benefits of the medication outweigh the risks.  Is getting adequate diet and exercise  Goals developed at last visit were met.  Follow up in 3 months  Care management needs are self-addressed. Would benefit from care management. Self-management abilities addressed and patient is capable of managing his own disease.           Relevant Orders    Lipid Panel With / Chol / HDL Ratio    Comprehensive metabolic panel    Hypertension, benign    Current Assessment & Plan     Poor control.  Add Norvasc 5 mg daily.   Patient tolerated Lisinopril and Metoprolol well without side effects. I feel the benefits of the medication outweigh the risks.  Encouraged to watch salt, exercise more and lose weight.           Microcytic anemia    Current Assessment & Plan      Resolved.  Labs done 10-, read by me, reviewed with pt. CBC was normal         Type 2 diabetes mellitus without complication, with long-term current use of insulin (HCC)    Current Assessment & Plan     Labs done 10-, read by me, reviewed with pt.  A1c was 7.0 down from 7.9  Improved.  Encouraged to watch sugar intake, exercise more and lose weight. Compliant with medication.  Patient tolerated Jardiance, Metformin, Novolin N and Novolin R well without side effects.  He is taking an 40 in the a.m. and 50 in the p.m. plus are with meals he may gradually titrate this up for larger meals up to 10.  I feel the benefits of the medication outweigh the risks.  Need to consider cost and the continuance of Jardiance.  Monitoring sugar at home.  These are reviewed and scanned to chart  Follow up in 3 months  Care management needs are self-addressed.  Self-management abilities addressed and patient is capable of managing his own disease.           Relevant Medications    metFORMIN (GLUCOPHAGE) 1000 MG tablet    empagliflozin (Jardiance) 10 MG tablet tablet    Other Relevant Orders    Hemoglobin A1c       Low    Vitamin B12 deficiency    Overview                Current Assessment & Plan     Doing well.  Labs done 10-, read by me, reviewed with pt.  Vit. B12 was 287 down from 289,  MMA was 201 down from 208            Unprioritized    Hyperkalemia    Current Assessment & Plan     Resolved. Labs done 10-, read by me, reviewed with pt.  Potassium was 4.3 down from 5.4

## 2021-11-01 NOTE — ASSESSMENT & PLAN NOTE
Poor control.  Add Norvasc 5 mg daily.   Patient tolerated Lisinopril and Metoprolol well without side effects. I feel the benefits of the medication outweigh the risks.  Encouraged to watch salt, exercise more and lose weight.

## 2021-11-01 NOTE — ASSESSMENT & PLAN NOTE
Labs done 10-, read by me, reviewed with pt.  A1c was 7.0 down from 7.9  Improved.  Encouraged to watch sugar intake, exercise more and lose weight. Compliant with medication.  Patient tolerated Jardiance, Metformin, Novolin N and Novolin R well without side effects.  He is taking an 40 in the a.m. and 50 in the p.m. plus are with meals he may gradually titrate this up for larger meals up to 10.  I feel the benefits of the medication outweigh the risks.  Need to consider cost and the continuance of Jardiance.  Monitoring sugar at home.  These are reviewed and scanned to chart  Follow up in 3 months  Care management needs are self-addressed.  Self-management abilities addressed and patient is capable of managing his own disease.

## 2021-11-02 RX ORDER — LISINOPRIL 20 MG/1
TABLET ORAL
Qty: 180 TABLET | Refills: 1 | Status: SHIPPED | OUTPATIENT
Start: 2021-11-02 | End: 2022-02-08 | Stop reason: SDUPTHER

## 2021-11-02 RX ORDER — AMLODIPINE BESYLATE 5 MG/1
5 TABLET ORAL DAILY
Qty: 90 TABLET | Refills: 1 | Status: SHIPPED | OUTPATIENT
Start: 2021-11-02 | End: 2022-02-08 | Stop reason: SDUPTHER

## 2022-01-16 LAB
ALBUMIN SERPL-MCNC: 4.5 G/DL (ref 3.8–4.8)
ALBUMIN/GLOB SERPL: 1.7 {RATIO} (ref 1.2–2.2)
ALP SERPL-CCNC: 78 IU/L (ref 44–121)
ALT SERPL-CCNC: 26 IU/L (ref 0–44)
AST SERPL-CCNC: 22 IU/L (ref 0–40)
BILIRUB SERPL-MCNC: 0.3 MG/DL (ref 0–1.2)
BUN SERPL-MCNC: 25 MG/DL (ref 8–27)
BUN/CREAT SERPL: 22 (ref 10–24)
CALCIUM SERPL-MCNC: 9.4 MG/DL (ref 8.6–10.2)
CHLORIDE SERPL-SCNC: 104 MMOL/L (ref 96–106)
CHOLEST SERPL-MCNC: 148 MG/DL (ref 100–199)
CHOLEST/HDLC SERPL: 4.2 RATIO (ref 0–5)
CO2 SERPL-SCNC: 21 MMOL/L (ref 20–29)
CREAT SERPL-MCNC: 1.13 MG/DL (ref 0.76–1.27)
GLOBULIN SER CALC-MCNC: 2.6 G/DL (ref 1.5–4.5)
GLUCOSE SERPL-MCNC: 122 MG/DL (ref 65–99)
HBA1C MFR BLD: 6.8 % (ref 4.8–5.6)
HDLC SERPL-MCNC: 35 MG/DL
LDLC SERPL CALC-MCNC: 94 MG/DL (ref 0–99)
POTASSIUM SERPL-SCNC: 5.1 MMOL/L (ref 3.5–5.2)
PROT SERPL-MCNC: 7.1 G/DL (ref 6–8.5)
SODIUM SERPL-SCNC: 140 MMOL/L (ref 134–144)
TRIGL SERPL-MCNC: 103 MG/DL (ref 0–149)
VLDLC SERPL CALC-MCNC: 19 MG/DL (ref 5–40)

## 2022-01-25 NOTE — PROGRESS NOTES
"Chief Complaint  Sleep Apnea    Subjective          Thor Crouch presents to [facility]UR[facility]  History of Present Illness  Yearly f/u for CPAP compliance, doing well with pap therapy.patient states he has received his new pap machine due to recall he states he got machine 10/2021 Pt. uses nasal pillows and gets supplies online.      SLEEP TESTING HISTORY:    On NPSG at [facility] , 10/12/2018 patient had Moderate obstructive sleep apnea syndrome with apnea-hypopnea index of 17.2 per sleep hour, minimum SpO2 of 82%    PAP download:  The patient is on CPAP therapy at 8-14 cm/H2O.   Data indicates Excellent compliance. With 100% usage for more than 4 hours with an average usage of 9 hours 18 minutes. AHI down to 3.1 .  Average pressures 8.5.  Average large leak 2min.     The patient's hypersomnia has resolved       [place] Sleepiness Scale:  Sitting and reading 1 WatchingTV 0  Sitting, inactive, in a public place 0  As a passenger in a car for 1 hour w/o a break  0  Lying down to rest in the afternoon  1  Sitting and talking to someone  0  Sitting quietly after a lunch  0  In a car, while stopped for traffic or a light  0  Total 2  Review of Systems   Constitutional: Negative for fatigue.   HENT: Negative for sinus pressure and sinus pain.    Eyes: Negative for pain and itching.   Respiratory: Negative for cough and shortness of breath.    Cardiovascular: Negative for chest pain.   Gastrointestinal: Negative for abdominal distention.   Genitourinary: Negative for frequency and urgency.   Musculoskeletal: Negative for neck pain and neck stiffness.   Neurological: Negative for dizziness and headaches.   Psychiatric/Behavioral: Negative for decreased concentration and sleep disturbance.       Objective   Vital Signs:   /82   Pulse 80   Temp 97.7 °F (36.5 °C) (Temporal)   Resp 16   Ht 177.8 cm (70\")   Wt 119 kg (262 lb)   BMI 37.59 kg/m²     Physical Exam  Vitals reviewed.   Constitutional:       " Appearance: Normal appearance.   Pulmonary:      Effort: Pulmonary effort is normal. No respiratory distress.   Neurological:      General: No focal deficit present.      Mental Status: He is alert and oriented to person, place, and time.   Psychiatric:         Mood and Affect: Mood normal.         Behavior: Behavior normal.        Result Review :                 Assessment and Plan    Diagnoses and all orders for this visit:    1. Obstructive sleep apnea (Primary)      Continue cpap   Increase min pressure to 9 continue max of 14  The patient is compliant with and benefiting from PAP therapy.      Follow Up   Return in about 1 year (around 1/27/2023).    Patient was given instructions and counseling regarding his condition or for health maintenance advice. Please see specific information pulled into the AVS if appropriate.       This document has been electronically signed by Joseph Seipel, MD on January 27, 2022 16:01 EST

## 2022-01-27 ENCOUNTER — OFFICE VISIT (OUTPATIENT)
Dept: NEUROLOGY | Facility: CLINIC | Age: 62
End: 2022-01-27

## 2022-01-27 VITALS
BODY MASS INDEX: 37.51 KG/M2 | HEART RATE: 80 BPM | RESPIRATION RATE: 16 BRPM | DIASTOLIC BLOOD PRESSURE: 82 MMHG | TEMPERATURE: 97.7 F | SYSTOLIC BLOOD PRESSURE: 143 MMHG | HEIGHT: 70 IN | WEIGHT: 262 LBS

## 2022-01-27 DIAGNOSIS — G47.33 OBSTRUCTIVE SLEEP APNEA: Primary | ICD-10-CM

## 2022-01-27 PROCEDURE — 99213 OFFICE O/P EST LOW 20 MIN: CPT | Performed by: PSYCHIATRY & NEUROLOGY

## 2022-02-08 ENCOUNTER — OFFICE VISIT (OUTPATIENT)
Dept: FAMILY MEDICINE CLINIC | Facility: CLINIC | Age: 62
End: 2022-02-08

## 2022-02-08 VITALS
RESPIRATION RATE: 18 BRPM | BODY MASS INDEX: 37.45 KG/M2 | DIASTOLIC BLOOD PRESSURE: 84 MMHG | HEART RATE: 75 BPM | WEIGHT: 261.6 LBS | TEMPERATURE: 97.5 F | HEIGHT: 70 IN | OXYGEN SATURATION: 97 % | SYSTOLIC BLOOD PRESSURE: 160 MMHG

## 2022-02-08 DIAGNOSIS — I51.7 LEFT ATRIAL ENLARGEMENT: ICD-10-CM

## 2022-02-08 DIAGNOSIS — R19.7 DIARRHEA, UNSPECIFIED TYPE: ICD-10-CM

## 2022-02-08 DIAGNOSIS — E11.21 DIABETIC NEPHROPATHY ASSOCIATED WITH TYPE 2 DIABETES MELLITUS: ICD-10-CM

## 2022-02-08 DIAGNOSIS — I10 HYPERTENSION, BENIGN: ICD-10-CM

## 2022-02-08 DIAGNOSIS — E78.2 MIXED HYPERLIPIDEMIA: Primary | ICD-10-CM

## 2022-02-08 DIAGNOSIS — Z79.4 TYPE 2 DIABETES MELLITUS WITHOUT COMPLICATION, WITH LONG-TERM CURRENT USE OF INSULIN: ICD-10-CM

## 2022-02-08 DIAGNOSIS — E11.9 TYPE 2 DIABETES MELLITUS WITHOUT COMPLICATION, WITH LONG-TERM CURRENT USE OF INSULIN: ICD-10-CM

## 2022-02-08 DIAGNOSIS — N18.31 CHRONIC RENAL FAILURE, STAGE 3A: ICD-10-CM

## 2022-02-08 DIAGNOSIS — E87.5 HYPERKALEMIA: ICD-10-CM

## 2022-02-08 PROCEDURE — 99214 OFFICE O/P EST MOD 30 MIN: CPT | Performed by: FAMILY MEDICINE

## 2022-02-08 RX ORDER — LISINOPRIL 20 MG/1
20 TABLET ORAL 2 TIMES DAILY
Qty: 180 TABLET | Refills: 1 | Status: SHIPPED | OUTPATIENT
Start: 2022-02-08 | End: 2022-09-13 | Stop reason: SDUPTHER

## 2022-02-08 RX ORDER — AMLODIPINE BESYLATE 5 MG/1
5 TABLET ORAL DAILY
Qty: 90 TABLET | Refills: 1 | Status: SHIPPED | OUTPATIENT
Start: 2022-02-08 | End: 2022-05-11 | Stop reason: SDUPTHER

## 2022-02-08 RX ORDER — CLOPIDOGREL BISULFATE 75 MG/1
75 TABLET ORAL DAILY
Qty: 90 TABLET | Refills: 1 | Status: SHIPPED | OUTPATIENT
Start: 2022-02-08 | End: 2022-09-13 | Stop reason: SDUPTHER

## 2022-02-08 RX ORDER — LOVASTATIN 40 MG/1
40 TABLET ORAL EVERY EVENING
Qty: 90 TABLET | Refills: 1 | Status: SHIPPED | OUTPATIENT
Start: 2022-02-08 | End: 2022-09-13 | Stop reason: SDUPTHER

## 2022-02-08 RX ORDER — METOPROLOL SUCCINATE 50 MG/1
50 TABLET, EXTENDED RELEASE ORAL DAILY
Qty: 90 TABLET | Refills: 3 | Status: SHIPPED | OUTPATIENT
Start: 2022-02-08 | End: 2022-09-14 | Stop reason: SDUPTHER

## 2022-02-08 NOTE — ASSESSMENT & PLAN NOTE
Borderline poor control with adding Norvasc last visit..  Patient tolerated Norvasc, Metoprolol and Lisinoprilwell without side effects. I feel the benefits of the medication outweigh the risks.  Encouraged to watch salt, exercise more and lose weight.

## 2022-02-08 NOTE — ASSESSMENT & PLAN NOTE
A1c CMP done 1-, read by me, reviewed with pt.  A1c was 6.8 down from 7.0  Improved.  Patient tolerated Jardiance, Novolin N, Novolin R and Metformin well without side effects. I feel the benefits of the medication outweigh the risks.  Encouraged to watch sugar intake, exercise more and lose weight. Compliant with medication.   Monitoring sugar at home.  Results reviewed and scanned to chart  Follow up in 3 months  Care management needs are self-addressed.  Self-management abilities addressed and patient is capable of managing his own disease.

## 2022-02-08 NOTE — ASSESSMENT & PLAN NOTE
Lipid and CMP done 1-, read by me, reviewed with pt.  Trig. 103 up from 100, Tot. Chol. 148 down from 153, HDL 35 down from 40, LDL 94 same as last  Worsening.   Encouraged to watch fatty intake, exercise more, and lose weight. Compliant with medication.  Patient tolerated Lovastatin well without side effects. I feel the benefits of the medication outweigh the risks.  Is not getting adequate diet and exercise  Goals developed at last visit were not met because diet and exercise.  Follow up in 3  months  Care management needs are self-addressed.  Self-management abilities addressed and patient is capable of managing his own disease.

## 2022-02-08 NOTE — PROGRESS NOTES
Subjective   Thor Crouch is a 61 y.o. male.   Chief Complaint   Patient presents with   • Diabetes   • Hypertension   • Hyperlipidemia     Diabetes  He presents for his follow-up diabetic visit. He has type 2 diabetes mellitus. His disease course has been improving. There are no hypoglycemic associated symptoms. There are no diabetic associated symptoms. Pertinent negatives for diabetes include no chest pain, no fatigue, no polyphagia, no polyuria and no weight loss. There are no hypoglycemic complications. Symptoms are resolved. There are no diabetic complications. Risk factors for coronary artery disease include diabetes mellitus, dyslipidemia, hypertension and obesity. Current diabetic treatment includes insulin injections and oral agent (monotherapy). He is compliant with treatment all of the time. He does not see a podiatrist.Eye exam is current.   Hypertension  This is a chronic problem. The current episode started more than 1 year ago. The problem has been gradually worsening since onset. The problem is controlled. Pertinent negatives include no chest pain, palpitations or shortness of breath. Risk factors for coronary artery disease include dyslipidemia and diabetes mellitus. The current treatment provides mild improvement. There are no compliance problems.    Hyperlipidemia  This is a chronic problem. The current episode started more than 1 year ago. The problem is controlled. Recent lipid tests were reviewed and are high. Exacerbating diseases include diabetes. Factors aggravating his hyperlipidemia include fatty foods. Pertinent negatives include no chest pain, myalgias or shortness of breath. Current antihyperlipidemic treatment includes statins. The current treatment provides moderate improvement of lipids. There are no compliance problems.  Risk factors for coronary artery disease include dyslipidemia, diabetes mellitus and hypertension.        The following portions of the patient's history were  reviewed and updated as appropriate: allergies, current medications, past family history, past medical history, past social history, past surgical history and problem list.    Past Medical History:   Diagnosis Date   • Cataract of both eyes     Other cataract of both eyes; Impression: Stable   • Diabetic nephropathy associated with type 2 diabetes mellitus (HCC)     Impression: Protein normal 06/18, will continue to repeat.   • Epiretinal membrane, left     Impression: Followed with Dr. Murcia   • History of prostate cancer     Impression: Patient has a follow up appt with Dr. Taylor NP today in [place], discussed with patient about transferring to Dr. Li or Michelle, so he can see them in the [place] office.   • Hypertension, benign     Impression: Good control; Encouraged to watch salt, exercise more and lose weight   • Left atrial enlargement     Impression: Stable, will follow conservatively   • Malignant neoplasm of prostate (HCC)     Impression: Doing well. Followed with Dr. Washington   • Microcytic anemia     Impression: cbc with next labs   • Overweight     >25   • Seasonal allergic rhinitis due to pollen     Impression: Doing well   • Situational stress     Impression: Stable   • Skin lesion     Unspecified Skin Lesion; Impression: Will obtain notes from [facility].   • Stroke with cerebral ischemia (HCC)     Impression: Doing well at this time. Will obtain records from Dr. Seipel, regarding medication Plavix.   • Type 2 diabetes mellitus with both eyes affected by mild nonproliferative retinopathy without macular edema, without long-term current use of insulin (HCC)     Impression: Uncertain control; Advised patient to start monitoring blood sugar at home since taking OTC cough/cold medications.   • Vitamin B12 deficiency     Impression: Patient was supposed to recieve a B12 injection, however patient left before it was done. Patient was called to come back to get this injection.  "      Past Surgical History:   Procedure Laterality Date   • PROSTATE SURGERY          Family History   Problem Relation Age of Onset   • Arthritis Mother    • Heart disease Mother         ischemic   • Goiter Father    • Heart disease Father         ischemic   • Heart attack Father    • Diabetes Sister         diabetes mellitus   • Diabetes Brother         diabetes mellitus   • Diabetes Maternal Uncle         diabetes mellitus   • Diabetes Paternal Grandmother         diabetes mellitus   • Stroke Paternal Grandfather    • Kidney failure Other         Uncle   • Arthritis Other         grandmother        Social History     Socioeconomic History   • Marital status:    Tobacco Use   • Smoking status: Never Smoker   • Smokeless tobacco: Never Used   Substance and Sexual Activity   • Alcohol use: No   • Drug use: No   • Sexual activity: Defer         Review of Systems   Constitutional: Negative for fatigue, unexpected weight gain and unexpected weight loss.   Eyes: Negative for visual disturbance.   Respiratory: Negative for shortness of breath.    Cardiovascular: Negative for chest pain, palpitations and leg swelling.   Gastrointestinal: Negative for nausea.   Endocrine: Negative for polyphagia and polyuria.   Genitourinary: Negative for frequency.   Musculoskeletal: Negative for myalgias.   Skin: Negative for dry skin and skin lesions.   Neurological: Negative for syncope, numbness and headache.       Objective   Visit Vitals  /84 (BP Location: Left arm, Patient Position: Sitting, Cuff Size: Large Adult)   Pulse 75   Temp 97.5 °F (36.4 °C) (Temporal)   Resp 18   Ht 177.8 cm (70\")   Wt 119 kg (261 lb 9.6 oz)   SpO2 97%   BMI 37.54 kg/m²     Physical Exam  Vitals and nursing note reviewed.   Constitutional:       Appearance: He is well-developed.   HENT:      Head: Normocephalic.   Neck:      Thyroid: No thyromegaly.      Vascular: No carotid bruit.      Trachea: Trachea normal.   Cardiovascular:      Rate " and Rhythm: Normal rate and regular rhythm.      Heart sounds: No murmur heard.  No friction rub. No gallop.    Pulmonary:      Effort: Pulmonary effort is normal. No respiratory distress.      Breath sounds: Normal breath sounds. No wheezing.   Chest:      Chest wall: No tenderness.   Musculoskeletal:      Cervical back: Neck supple.   Skin:     General: Skin is dry.      Findings: No rash.      Nails: There is no clubbing.   Neurological:      Mental Status: He is alert and oriented to person, place, and time.   Psychiatric:         Behavior: Behavior is cooperative.         Assessment/Plan   Problem List Items Addressed This Visit        Medium    Chronic renal failure, stage 3a (HCC)    Current Assessment & Plan     Resolved.         Diabetic nephropathy associated with type 2 diabetes mellitus (HCC)    Relevant Medications    empagliflozin (Jardiance) 10 MG tablet tablet    metFORMIN (GLUCOPHAGE) 500 MG tablet    Hyperlipidemia - Primary    Current Assessment & Plan     Lipid and CMP done 1-, read by me, reviewed with pt.  Trig. 103 up from 100, Tot. Chol. 148 down from 153, HDL 35 down from 40, LDL 94 same as last  Worsening.   Encouraged to watch fatty intake, exercise more, and lose weight. Compliant with medication.  Patient tolerated Lovastatin well without side effects. I feel the benefits of the medication outweigh the risks.  Is not getting adequate diet and exercise  Goals developed at last visit were not met because diet and exercise.  Follow up in 3  months  Care management needs are self-addressed.  Self-management abilities addressed and patient is capable of managing his own disease.           Relevant Medications    lovastatin (MEVACOR) 40 MG tablet    Other Relevant Orders    Lipid Panel With / Chol / HDL Ratio    Comprehensive metabolic panel    Hypertension, benign    Current Assessment & Plan     Borderline poor control with adding Norvasc last visit..  Patient tolerated Norvasc,  Metoprolol and Lisinoprilwell without side effects. I feel the benefits of the medication outweigh the risks.  Encouraged to watch salt, exercise more and lose weight.           Relevant Medications    amLODIPine (NORVASC) 5 MG tablet    lisinopril (PRINIVIL,ZESTRIL) 20 MG tablet    metoprolol succinate XL (TOPROL-XL) 50 MG 24 hr tablet    Type 2 diabetes mellitus without complication, with long-term current use of insulin (HCC)    Current Assessment & Plan     A1c CMP done 1-, read by me, reviewed with pt.  A1c was 6.8 down from 7.0  Improved.  Patient tolerated Jardiance, Novolin N, Novolin R and Metformin well without side effects. I feel the benefits of the medication outweigh the risks.  Encouraged to watch sugar intake, exercise more and lose weight. Compliant with medication.   Monitoring sugar at home.  Results reviewed and scanned to chart  Follow up in 3 months  Care management needs are self-addressed.  Self-management abilities addressed and patient is capable of managing his own disease.           Relevant Medications    empagliflozin (Jardiance) 10 MG tablet tablet    metFORMIN (GLUCOPHAGE) 500 MG tablet    Other Relevant Orders    Hemoglobin A1c       Low    Left atrial enlargement    Relevant Medications    amLODIPine (NORVASC) 5 MG tablet    clopidogrel (PLAVIX) 75 MG tablet    metoprolol succinate XL (TOPROL-XL) 50 MG 24 hr tablet       Unprioritized    Diarrhea    Current Assessment & Plan     New dx.  2nd to Metformin, advised to decrease Metformin to  500 mg BID from 1000 mg BID.         Hyperkalemia    Current Assessment & Plan     Resolved.  CMP done 1-, read by me, reviewed with pt.  Potassium was 5.1 up from 4.3

## 2022-04-18 ENCOUNTER — OFFICE VISIT (OUTPATIENT)
Dept: FAMILY MEDICINE CLINIC | Facility: CLINIC | Age: 62
End: 2022-04-18

## 2022-04-18 VITALS
OXYGEN SATURATION: 98 % | HEART RATE: 91 BPM | WEIGHT: 258.2 LBS | BODY MASS INDEX: 39.13 KG/M2 | RESPIRATION RATE: 18 BRPM | HEIGHT: 68 IN | DIASTOLIC BLOOD PRESSURE: 80 MMHG | TEMPERATURE: 98.6 F | SYSTOLIC BLOOD PRESSURE: 146 MMHG

## 2022-04-18 DIAGNOSIS — E66.3 OVERWEIGHT: ICD-10-CM

## 2022-04-18 DIAGNOSIS — J06.9 UPPER RESPIRATORY TRACT INFECTION, UNSPECIFIED TYPE: Primary | ICD-10-CM

## 2022-04-18 PROCEDURE — 99213 OFFICE O/P EST LOW 20 MIN: CPT | Performed by: FAMILY MEDICINE

## 2022-04-18 RX ORDER — AMOXICILLIN 500 MG/1
1000 TABLET, FILM COATED ORAL 3 TIMES DAILY
Qty: 60 TABLET | Refills: 0 | Status: SHIPPED | OUTPATIENT
Start: 2022-04-18 | End: 2022-05-11

## 2022-04-18 NOTE — PROGRESS NOTES
Subjective   Thor Crouch is a 61 y.o. male.     Chief Complaint   Patient presents with   • URI       At home covid test was negative 04/17/2022.     URI   This is a new problem. Episode onset: 04/16/2022. The problem has been unchanged. Associated symptoms include congestion, coughing, ear pain, headaches, a plugged ear sensation and rhinorrhea. Pertinent negatives include no abdominal pain, chest pain, nausea, sinus pain, sneezing, sore throat, vomiting or wheezing. Associated symptoms comments: No taste or smell.. He has tried antihistamine and decongestant for the symptoms. The treatment provided mild relief.            I personally reviewed and updated the patient's allergies, medications, problem list, and past medical, surgical, social, and family history. I have reviewed and confirmed the accuracy of the History of Present Illness and Review of Symptoms as documented by the MA/LPN/RN. Osiel Piper MD    Family History   Problem Relation Age of Onset   • Arthritis Mother    • Heart disease Mother         ischemic   • Goiter Father    • Heart disease Father         ischemic   • Heart attack Father    • Diabetes Sister         diabetes mellitus   • Diabetes Brother         diabetes mellitus   • Diabetes Maternal Uncle         diabetes mellitus   • Diabetes Paternal Grandmother         diabetes mellitus   • Stroke Paternal Grandfather    • Kidney failure Other         Uncle   • Arthritis Other         grandmother       Social History     Tobacco Use   • Smoking status: Never Smoker   • Smokeless tobacco: Never Used   Substance Use Topics   • Alcohol use: No   • Drug use: No       Past Surgical History:   Procedure Laterality Date   • PROSTATE SURGERY         Patient Active Problem List   Diagnosis   • Cataract of both eyes   • Diabetic nephropathy associated with type 2 diabetes mellitus (HCC)   • Epiretinal membrane, left   • Family history of ischemic heart disease   • Hyperlipidemia   • Hypertension, benign    • Left atrial enlargement   • Obstructive sleep apnea   • Overweight   • Chronic renal failure, stage 3a (HCC)   • Seasonal allergic rhinitis due to pollen   • Situational stress   • Type 2 diabetes mellitus with both eyes affected by mild nonproliferative retinopathy without macular edema, without long-term current use of insulin (HCC)   • Vitamin B12 deficiency   • Microcytic anemia   • Type 2 diabetes mellitus without complication, with long-term current use of insulin (HCC)   • Myalgia   • Stroke with cerebral ischemia (HCC)   • Malignant neoplasm of prostate (HCC)   • Neoplasm, uncertain whether benign or malignant   • Hypoglycemia   • Nocturia   • Hyperkalemia   • Encounter for general adult medical examination with abnormal findings   • Diarrhea         Current Outpatient Medications:   •  clopidogrel (PLAVIX) 75 MG tablet, Take 1 tablet by mouth Daily., Disp: 90 tablet, Rfl: 1  •  empagliflozin (Jardiance) 10 MG tablet tablet, Take 1 tablet by mouth Daily., Disp: 90 tablet, Rfl: 1  •  fluticasone (Flonase Allergy Relief) 50 MCG/ACT nasal spray, 2 sprays by Each Nare route Daily. Shake well before using., Disp: 16 g, Rfl: 3  •  insulin NPH (NovoLIN N ReliOn) 100 UNIT/ML injection, Inject 90 units under the skin at hs, Disp: 30 mL, Rfl: 12  •  insulin regular (NovoLIN R) 100 UNIT/ML injection, Inject 30 Units under the skin into the appropriate area as directed Daily. Take within 30 minutes of beginning largest  meal., Disp: 1 each, Rfl: 12  •  lisinopril (PRINIVIL,ZESTRIL) 20 MG tablet, Take 1 tablet by mouth 2 (Two) Times a Day., Disp: 180 tablet, Rfl: 1  •  Loratadine (CLARITIN PO), Take  by mouth., Disp: , Rfl:   •  lovastatin (MEVACOR) 40 MG tablet, Take 1 tablet by mouth Every Evening., Disp: 90 tablet, Rfl: 1  •  metFORMIN (GLUCOPHAGE) 500 MG tablet, Take 1 tablet by mouth 2 (Two) Times a Day., Disp: 180 tablet, Rfl: 3  •  metoprolol succinate XL (TOPROL-XL) 50 MG 24 hr tablet, Take 1 tablet by mouth  "Daily., Disp: 90 tablet, Rfl: 3  •  ReliOn Insulin Syringe 31G X 15/64\" 1 ML misc, INJECT 80  ONCE DAILY AT NIGHT, Disp: 90 each, Rfl: 0  •  ReliOn Insulin Syringe 31G X 15/64\" 1 ML misc, Inject 80 Units under the skin into the appropriate area as directed 2 (two) times a day., Disp: 100 each, Rfl: 12  •  Accu-Chek FastClix Lancets misc, Check blood sugar 3 x daily, Disp: 102 each, Rfl: 5  •  amLODIPine (NORVASC) 5 MG tablet, Take 1 tablet by mouth Daily., Disp: 90 tablet, Rfl: 3  •  Dulaglutide (Trulicity) 0.75 MG/0.5ML solution pen-injector, Inject 0.75 mg under the skin into the appropriate area as directed 1 (One) Time Per Week., Disp: 1 pen, Rfl: 12  •  glucose blood (Accu-Chek Guide) test strip, 1 each by Other route 3 (Three) Times a Day., Disp: 100 each, Rfl: 12    Current Facility-Administered Medications:   •  cyanocobalamin injection 1,000 mcg, 1,000 mcg, Intramuscular, Q28 Days, Hayes Sharp MD, 1,000 mcg at 07/22/20 1727  •  cyanocobalamin injection 1,000 mcg, 1,000 mcg, Intramuscular, Q28 Days, Hayes Sharp MD, 1,000 mcg at 11/02/20 1725         Review of Systems   Constitutional: Negative for chills and diaphoresis.   HENT: Positive for congestion, ear pain and rhinorrhea. Negative for sneezing and sore throat.    Eyes: Negative for visual disturbance.   Respiratory: Positive for cough. Negative for shortness of breath and wheezing.    Cardiovascular: Negative for chest pain and palpitations.   Gastrointestinal: Negative for abdominal pain, nausea and vomiting.   Endocrine: Negative for polydipsia and polyphagia.   Musculoskeletal: Negative for neck stiffness.   Skin: Negative for color change and pallor.   Neurological: Negative for seizures and syncope.   Hematological: Negative for adenopathy.       I have reviewed and confirmed the accuracy of the ROS as documented by the MA/LPN/RN Osiel Piper MD      Objective   /80 (BP Location: Right arm, Patient Position: Sitting, Cuff Size: " "Adult)   Pulse 91   Temp 98.6 °F (37 °C) (Temporal)   Resp 18   Ht 172.7 cm (68\")   Wt 117 kg (258 lb 3.2 oz)   SpO2 98% Comment: room air  BMI 39.26 kg/m²   BP Readings from Last 3 Encounters:   05/11/22 139/88   04/18/22 146/80   02/08/22 160/84     Wt Readings from Last 3 Encounters:   05/11/22 118 kg (260 lb 9.6 oz)   04/18/22 117 kg (258 lb 3.2 oz)   02/08/22 119 kg (261 lb 9.6 oz)     Physical Exam  Constitutional:       Appearance: Normal appearance. He is well-developed. He is not diaphoretic.   HENT:      Head: Normocephalic.      Right Ear: Hearing, ear canal and external ear normal. A middle ear effusion is present. Tympanic membrane is erythematous.      Left Ear: Hearing, ear canal and external ear normal. A middle ear effusion is present. Tympanic membrane is erythematous.      Nose: Congestion present.      Right Sinus: Maxillary sinus tenderness and frontal sinus tenderness present.      Left Sinus: Maxillary sinus tenderness and frontal sinus tenderness present.      Mouth/Throat:      Pharynx: Posterior oropharyngeal erythema present.      Tonsils: No tonsillar abscesses. 1+ on the right. 1+ on the left.   Eyes:      General: Lids are normal.      Conjunctiva/sclera: Conjunctivae normal.      Pupils: Pupils are equal, round, and reactive to light.   Neck:      Meningeal: Brudzinski's sign and Kernig's sign absent.   Cardiovascular:      Rate and Rhythm: Normal rate and regular rhythm.      Pulses: Normal pulses.      Heart sounds: Normal heart sounds, S1 normal and S2 normal. No murmur heard.    No friction rub. No gallop.   Pulmonary:      Effort: Pulmonary effort is normal. No accessory muscle usage or respiratory distress.      Breath sounds: No stridor. Examination of the right-upper field reveals wheezing and rhonchi. Examination of the left-upper field reveals wheezing and rhonchi. Examination of the right-middle field reveals wheezing and rhonchi. Examination of the left-middle field " reveals wheezing and rhonchi. Examination of the right-lower field reveals wheezing and rhonchi. Examination of the left-lower field reveals wheezing and rhonchi. Wheezing and rhonchi present. No decreased breath sounds or rales.   Abdominal:      General: Bowel sounds are normal. There is no distension.      Palpations: Abdomen is soft. There is no mass.      Tenderness: There is no abdominal tenderness.      Hernia: No hernia is present.   Skin:     General: Skin is warm and dry.      Coloration: Skin is not pale.   Neurological:      Mental Status: He is alert and oriented to person, place, and time.      Cranial Nerves: No cranial nerve deficit.      Coordination: Coordination normal.      Gait: Gait normal.         Data / Lab Results:    Hemoglobin A1C   Date Value Ref Range Status   05/04/2022 7.6 (H) 4.8 - 5.6 % Final     Comment:              Prediabetes: 5.7 - 6.4           Diabetes: >6.4           Glycemic control for adults with diabetes: <7.0     01/15/2022 6.8 (H) 4.8 - 5.6 % Final     Comment:              Prediabetes: 5.7 - 6.4           Diabetes: >6.4           Glycemic control for adults with diabetes: <7.0     10/16/2021 7.0 (H) 4.8 - 5.6 % Final     Comment:              Prediabetes: 5.7 - 6.4           Diabetes: >6.4           Glycemic control for adults with diabetes: <7.0     09/17/2018 7.1 4.6 - 7.1 % Final   08/28/2017 8.0 4.6 - 7.1 % Final   07/11/2016 7.4 4.6 - 7.1 % Final        Lab Results   Component Value Date    LDL 89 05/04/2022    LDL 94 01/15/2022    LDL 94 10/16/2021     Lab Results   Component Value Date    CHOL 136 12/15/2018    CHOL 165 07/05/2018    CHOL 153 06/16/2018     Lab Results   Component Value Date    TRIG 126 05/04/2022    TRIG 103 01/15/2022    TRIG 100 10/16/2021     Lab Results   Component Value Date    HDL 37 (L) 05/04/2022    HDL 35 (L) 01/15/2022    HDL 40 10/16/2021     No results found for: PSA  Lab Results   Component Value Date    WBC 7.5 10/16/2021    HGB  15.4 10/16/2021    HCT 48.5 10/16/2021    MCV 86 10/16/2021     10/16/2021     Lab Results   Component Value Date    TSH 1.440 08/21/2021      Lab Results   Component Value Date    GLUCOSE 88 05/04/2022    BUN 27 05/04/2022    CREATININE 1.17 05/04/2022    EGFRIFNONA 69 01/15/2022    EGFRIFAFRI 80 01/15/2022    BCR 23 05/04/2022    K 4.7 05/04/2022    CO2 22 05/04/2022    CALCIUM 9.8 05/04/2022    PROTENTOTREF 7.2 05/04/2022    ALBUMIN 4.5 05/04/2022    LABIL2 1.7 05/04/2022    AST 16 05/04/2022    ALT 19 05/04/2022     No results found for: MARKY, RF, SEDRATE   No results found for: CRP   Lab Results   Component Value Date    IRON 61 01/07/2017    FERRITIN 28 01/07/2017      Lab Results   Component Value Date    [id number] 287 10/16/2021          Assessment & Plan      Medications        Problem List         LOS    Acute bacterial bronchitis.  Start antibiotics.  Increase fluid intake.  Call return if fever worsening symptoms.      Diagnoses and all orders for this visit:    1. Upper respiratory tract infection, unspecified type (Primary)  -     Discontinue: HYDROcodone-homatropine (Hydromet) 5-1.5 MG/5ML syrup; Take 5 mL nightly as needed  Dispense: 180 mL; Refill: 0  -     Discontinue: amoxicillin (AMOXIL) 500 MG tablet; Take 2 tablets by mouth 3 (Three) Times a Day.  Dispense: 60 tablet; Refill: 0    2. Overweight  Assessment & Plan:  Patient's (Body mass index is 39.26 kg/m².) indicates that they are overweight with health conditions that include hypertension, diabetes mellitus and dyslipidemias . Weight is unchanged. BMI is is above average; BMI management plan is completed. We discussed portion control and increasing exercise.               Expected course, medications, and adverse effects discussed.  Call or return if worsening or persistent symptoms.  I wore protective equipment throughout this patient encounter including a mask, gloves, and eye protection.  Hand hygiene was performed before donning  protective equipment and after removal when leaving the room. The complete contents of the Assessment and Plan and Data/Lab Results as documented above have been reviewed and addressed by myself with the patient today as part of an ongoing evaluation / treatment plan.  If some of the documentation has been copied from a previous note and is unchanged it indicates that this problem / plan has been assessed today but is stable from a previous visit and no changes have been recommended.

## 2022-05-05 LAB
ALBUMIN SERPL-MCNC: 4.5 G/DL (ref 3.8–4.8)
ALBUMIN/GLOB SERPL: 1.7 {RATIO} (ref 1.2–2.2)
ALP SERPL-CCNC: 84 IU/L (ref 44–121)
ALT SERPL-CCNC: 19 IU/L (ref 0–44)
AST SERPL-CCNC: 16 IU/L (ref 0–40)
BILIRUB SERPL-MCNC: 0.2 MG/DL (ref 0–1.2)
BUN SERPL-MCNC: 27 MG/DL (ref 8–27)
BUN/CREAT SERPL: 23 (ref 10–24)
CALCIUM SERPL-MCNC: 9.8 MG/DL (ref 8.6–10.2)
CHLORIDE SERPL-SCNC: 103 MMOL/L (ref 96–106)
CHOLEST SERPL-MCNC: 149 MG/DL (ref 100–199)
CHOLEST/HDLC SERPL: 4 RATIO (ref 0–5)
CO2 SERPL-SCNC: 22 MMOL/L (ref 20–29)
CREAT SERPL-MCNC: 1.17 MG/DL (ref 0.76–1.27)
EGFRCR SERPLBLD CKD-EPI 2021: 70 ML/MIN/1.73
GLOBULIN SER CALC-MCNC: 2.7 G/DL (ref 1.5–4.5)
GLUCOSE SERPL-MCNC: 88 MG/DL (ref 65–99)
HBA1C MFR BLD: 7.6 % (ref 4.8–5.6)
HDLC SERPL-MCNC: 37 MG/DL
LDLC SERPL CALC-MCNC: 89 MG/DL (ref 0–99)
POTASSIUM SERPL-SCNC: 4.7 MMOL/L (ref 3.5–5.2)
PROT SERPL-MCNC: 7.2 G/DL (ref 6–8.5)
SODIUM SERPL-SCNC: 142 MMOL/L (ref 134–144)
TRIGL SERPL-MCNC: 126 MG/DL (ref 0–149)
VLDLC SERPL CALC-MCNC: 23 MG/DL (ref 5–40)

## 2022-05-11 ENCOUNTER — OFFICE VISIT (OUTPATIENT)
Dept: FAMILY MEDICINE CLINIC | Facility: CLINIC | Age: 62
End: 2022-05-11

## 2022-05-11 VITALS
WEIGHT: 260.6 LBS | OXYGEN SATURATION: 98 % | RESPIRATION RATE: 15 BRPM | HEART RATE: 67 BPM | BODY MASS INDEX: 39.5 KG/M2 | DIASTOLIC BLOOD PRESSURE: 88 MMHG | HEIGHT: 68 IN | SYSTOLIC BLOOD PRESSURE: 139 MMHG | TEMPERATURE: 97.5 F

## 2022-05-11 DIAGNOSIS — E11.3293 TYPE 2 DIABETES MELLITUS WITH BOTH EYES AFFECTED BY MILD NONPROLIFERATIVE RETINOPATHY WITHOUT MACULAR EDEMA, WITHOUT LONG-TERM CURRENT USE OF INSULIN: ICD-10-CM

## 2022-05-11 DIAGNOSIS — I10 HYPERTENSION, BENIGN: ICD-10-CM

## 2022-05-11 DIAGNOSIS — E11.9 TYPE 2 DIABETES MELLITUS WITHOUT COMPLICATION, WITH LONG-TERM CURRENT USE OF INSULIN: ICD-10-CM

## 2022-05-11 DIAGNOSIS — E78.2 MIXED HYPERLIPIDEMIA: Primary | ICD-10-CM

## 2022-05-11 DIAGNOSIS — Z79.4 TYPE 2 DIABETES MELLITUS WITHOUT COMPLICATION, WITH LONG-TERM CURRENT USE OF INSULIN: ICD-10-CM

## 2022-05-11 PROCEDURE — 99214 OFFICE O/P EST MOD 30 MIN: CPT | Performed by: FAMILY MEDICINE

## 2022-05-11 RX ORDER — AMLODIPINE BESYLATE 5 MG/1
5 TABLET ORAL DAILY
Qty: 90 TABLET | Refills: 3 | Status: SHIPPED | OUTPATIENT
Start: 2022-05-11 | End: 2022-09-14 | Stop reason: SDUPTHER

## 2022-05-11 RX ORDER — LANCETS
EACH MISCELLANEOUS
Qty: 102 EACH | Refills: 5 | Status: SHIPPED | OUTPATIENT
Start: 2022-05-11 | End: 2022-09-13 | Stop reason: SDUPTHER

## 2022-05-11 RX ORDER — DULAGLUTIDE 0.75 MG/.5ML
0.75 INJECTION, SOLUTION SUBCUTANEOUS WEEKLY
Qty: 1 PEN | Refills: 12 | Status: SHIPPED | OUTPATIENT
Start: 2022-05-11 | End: 2022-09-13

## 2022-05-11 RX ORDER — BLOOD SUGAR DIAGNOSTIC
1 STRIP MISCELLANEOUS 3 TIMES DAILY
Qty: 100 EACH | Refills: 12 | Status: SHIPPED | OUTPATIENT
Start: 2022-05-11 | End: 2022-09-13 | Stop reason: SDUPTHER

## 2022-05-11 NOTE — ASSESSMENT & PLAN NOTE
Borderline poor control; Encouraged to watch salt, exercise more and lose weight.  Patient tolerated lisinopril, norvasc, metoprolol well without side effects. I feel the benefits of the medication outweigh the risks.

## 2022-05-11 NOTE — ASSESSMENT & PLAN NOTE
Stable   Encouraged to watch fatty intake, exercise more, and lose weight.   compliant with medication   Is not getting adequate diet and exercise  Goals developed at last visit were  met   Follow up in 3  months  Care management needs are self-addressed. . Self-management abilities addressed and patient is capable of managing his own disease.

## 2022-05-11 NOTE — PROGRESS NOTES
Subjective   Thor Crouch is a 61 y.o. male.     Hypertension  This is a chronic problem. The current episode started more than 1 year ago. The problem has been gradually improving since onset. The problem is controlled. Pertinent negatives include no chest pain, palpitations or shortness of breath. Risk factors for coronary artery disease include dyslipidemia and diabetes mellitus. Current antihypertension treatment includes calcium channel blockers, beta blockers and ACE inhibitors.   Hyperlipidemia  This is a chronic problem. The current episode started more than 1 year ago. The problem is controlled. Pertinent negatives include no chest pain, myalgias or shortness of breath. Current antihyperlipidemic treatment includes statins. Risk factors for coronary artery disease include dyslipidemia, diabetes mellitus and hypertension.   Diabetes  He presents for his follow-up diabetic visit. He has type 2 diabetes mellitus. His disease course has been worsening. There are no hypoglycemic associated symptoms. There are no diabetic associated symptoms. Pertinent negatives for diabetes include no chest pain, no fatigue, no polyphagia, no polyuria and no weight loss. There are no diabetic complications. Risk factors for coronary artery disease include dyslipidemia, diabetes mellitus and hypertension.        The following portions of the patient's history were reviewed and updated as appropriate: current medications, past family history, past medical history, past social history, past surgical history and problem list.    Family History   Problem Relation Age of Onset   • Arthritis Mother    • Heart disease Mother         ischemic   • Goiter Father    • Heart disease Father         ischemic   • Heart attack Father    • Diabetes Sister         diabetes mellitus   • Diabetes Brother         diabetes mellitus   • Diabetes Maternal Uncle         diabetes mellitus   • Diabetes Paternal Grandmother         diabetes mellitus   •  Stroke Paternal Grandfather    • Kidney failure Other         Uncle   • Arthritis Other         grandmother       Social History     Tobacco Use   • Smoking status: Never Smoker   • Smokeless tobacco: Never Used   Substance Use Topics   • Alcohol use: No   • Drug use: No       Past Surgical History:   Procedure Laterality Date   • PROSTATE SURGERY         Patient Active Problem List   Diagnosis   • Cataract of both eyes   • Diabetic nephropathy associated with type 2 diabetes mellitus (HCC)   • Epiretinal membrane, left   • Family history of ischemic heart disease   • Hyperlipidemia   • Hypertension, benign   • Left atrial enlargement   • Obstructive sleep apnea   • Overweight   • Chronic renal failure, stage 3a ([facility])   • Seasonal allergic rhinitis due to pollen   • Situational stress   • Type 2 diabetes mellitus with both eyes affected by mild nonproliferative retinopathy without macular edema, without long-term current use of insulin ([facility])   • Vitamin B12 deficiency   • Microcytic anemia   • Type 2 diabetes mellitus without complication, with long-term current use of insulin ([facility])   • Myalgia   • Stroke with cerebral ischemia ([facility])   • Malignant neoplasm of prostate (HCC)   • Neoplasm, uncertain whether benign or malignant   • Hypoglycemia   • Nocturia   • Hyperkalemia   • Encounter for general adult medical examination with abnormal findings   • Diarrhea       Current Outpatient Medications on File Prior to Visit   Medication Sig Dispense Refill   • clopidogrel (PLAVIX) 75 MG tablet Take 1 tablet by mouth Daily. 90 tablet 1   • empagliflozin (Jardiance) 10 MG tablet tablet Take 1 tablet by mouth Daily. 90 tablet 1   • fluticasone (Flonase Allergy Relief) 50 MCG/ACT nasal spray 2 sprays by Each Nare route Daily. Shake well before using. 16 g 3   • insulin NPH (NovoLIN N ReliOn) 100 UNIT/ML injection Inject 90 units under the skin at hs 30 mL 12   • insulin regular (NovoLIN R) 100 UNIT/ML injection Inject 30 Units  "under the skin into the appropriate area as directed Daily. Take within 30 minutes of beginning largest  meal. 1 each 12   • lisinopril (PRINIVIL,ZESTRIL) 20 MG tablet Take 1 tablet by mouth 2 (Two) Times a Day. 180 tablet 1   • Loratadine (CLARITIN PO) Take  by mouth.     • lovastatin (MEVACOR) 40 MG tablet Take 1 tablet by mouth Every Evening. 90 tablet 1   • metFORMIN (GLUCOPHAGE) 500 MG tablet Take 1 tablet by mouth 2 (Two) Times a Day. 180 tablet 3   • metoprolol succinate XL (TOPROL-XL) 50 MG 24 hr tablet Take 1 tablet by mouth Daily. 90 tablet 3   • ReliOn Insulin Syringe 31G X 15/64\" 1 ML misc INJECT 80  ONCE DAILY AT NIGHT 90 each 0   • ReliOn Insulin Syringe 31G X 15/64\" 1 ML misc Inject 80 Units under the skin into the appropriate area as directed 2 (two) times a day. 100 each 12     Current Facility-Administered Medications on File Prior to Visit   Medication Dose Route Frequency Provider Last Rate Last Admin   • cyanocobalamin injection 1,000 mcg  1,000 mcg Intramuscular Q28 Days Hayes Sharp MD   1,000 mcg at 07/22/20 1727   • cyanocobalamin injection 1,000 mcg  1,000 mcg Intramuscular Q28 Days Hayes Sharp MD   1,000 mcg at 11/02/20 1725       Allergies   Allergen Reactions   • Sulfa Antibiotics Dizziness       Review of Systems   Constitutional: Negative for fatigue, unexpected weight gain and unexpected weight loss.   Eyes: Negative for visual disturbance.   Respiratory: Negative for shortness of breath.    Cardiovascular: Negative for chest pain, palpitations and leg swelling.   Gastrointestinal: Negative for nausea.   Endocrine: Negative for polyphagia and polyuria.   Genitourinary: Negative for frequency.   Musculoskeletal: Negative for myalgias.   Skin: Negative for dry skin and skin lesions.   Neurological: Negative for syncope, numbness and headache.       Objective   Visit Vitals  /88 (BP Location: Left arm, Patient Position: Sitting, Cuff Size: Adult)   Pulse 67   Temp 97.5 " "°F (36.4 °C)   Resp 15   Ht 172.7 cm (68\")   Wt 118 kg (260 lb 9.6 oz)   SpO2 98%   BMI 39.62 kg/m²     Physical Exam  Vitals and nursing note reviewed.   Constitutional:       Appearance: He is well-developed. He is obese.   HENT:      Head: Normocephalic.   Neck:      Thyroid: No thyromegaly.      Vascular: No carotid bruit.      Trachea: Trachea normal.   Cardiovascular:      Rate and Rhythm: Normal rate and regular rhythm.      Heart sounds: No murmur heard.    No friction rub. No gallop.   Pulmonary:      Effort: Pulmonary effort is normal. No respiratory distress.      Breath sounds: Normal breath sounds. No wheezing.   Chest:      Chest wall: No tenderness.   Musculoskeletal:      Cervical back: Neck supple.   Skin:     General: Skin is dry.      Findings: No rash.      Nails: There is no clubbing.   Neurological:      Mental Status: He is alert and oriented to person, place, and time.   Psychiatric:         Behavior: Behavior is cooperative.           Assessment & Plan .  Problem List Items Addressed This Visit        Medium    Hyperlipidemia - Primary    Current Assessment & Plan     Stable   Encouraged to watch fatty intake, exercise more, and lose weight.   compliant with medication   Is not getting adequate diet and exercise  Goals developed at last visit were  met   Follow up in 3  months  Care management needs are self-addressed. . Self-management abilities addressed and patient is capable of managing his own disease.             Hypertension, benign    Current Assessment & Plan     Borderline poor control; Encouraged to watch salt, exercise more and lose weight.  Patient tolerated lisinopril, norvasc, metoprolol well without side effects. I feel the benefits of the medication outweigh the risks.             Relevant Medications    amLODIPine (NORVASC) 5 MG tablet    Type 2 diabetes mellitus with both eyes affected by mild nonproliferative retinopathy without macular edema, without long-term current " use of insulin ([facility])    Current Assessment & Plan     Worsening. Hgb a1c 7.6 up from 6.8; Patient was given a prescription for Trulicity to check insurance cost. If affordable stop Jardiance and start Ttrulicity.  If too expensive we will go back to your Jardiance-- also to increase Novolon R by 2 units to a max of 20untis  Encouraged to watch sugar intake, exercise more and lose weight.   compliant with medication. Patient tolerated jardiance, Novolin N and Novoloin R.   well without side effects. I feel the benefits of the medication outweigh the risks.    monitoring sugar at home.   Follow up in months  Care management needs are self-addressed. Would benefit from care management. Self-management abilities addressed and patient is capable of managing his own disease.             Relevant Medications    Dulaglutide (Trulicity) 0.75 MG/0.5ML solution pen-injector    Type 2 diabetes mellitus without complication, with long-term current use of insulin (HCC)    Relevant Medications    Dulaglutide (Trulicity) 0.75 MG/0.5ML solution pen-injector    Accu-Chek FastClix Lancets misc    glucose blood (Accu-Chek Guide) test strip

## 2022-05-11 NOTE — ASSESSMENT & PLAN NOTE
Worsening. Hgb a1c 7.6 up from 6.8; Patient was given a prescription for Trulicity to check insurance cost. If affordable stop Jardiance and start Ttrulicity.  If too expensive we will go back to your Jardiance-- also to increase Novolon R by 2 units to a max of 20untis  Encouraged to watch sugar intake, exercise more and lose weight.   compliant with medication. Patient tolerated jardiance, Novolin N and Novoloin R.   well without side effects. I feel the benefits of the medication outweigh the risks.    monitoring sugar at home.   Follow up in months  Care management needs are self-addressed. Would benefit from care management. Self-management abilities addressed and patient is capable of managing his own disease.

## 2022-08-30 ENCOUNTER — OFFICE VISIT (OUTPATIENT)
Dept: FAMILY MEDICINE CLINIC | Facility: CLINIC | Age: 62
End: 2022-08-30

## 2022-08-30 VITALS
WEIGHT: 264.6 LBS | TEMPERATURE: 97.7 F | BODY MASS INDEX: 40.1 KG/M2 | OXYGEN SATURATION: 98 % | HEIGHT: 68 IN | RESPIRATION RATE: 15 BRPM | HEART RATE: 73 BPM

## 2022-08-30 DIAGNOSIS — R53.83 LETHARGY: ICD-10-CM

## 2022-08-30 DIAGNOSIS — I63.9 STROKE WITH CEREBRAL ISCHEMIA: ICD-10-CM

## 2022-08-30 DIAGNOSIS — I10 HYPERTENSION, BENIGN: Primary | ICD-10-CM

## 2022-08-30 DIAGNOSIS — E78.2 MIXED HYPERLIPIDEMIA: ICD-10-CM

## 2022-08-30 DIAGNOSIS — C61 MALIGNANT NEOPLASM OF PROSTATE: ICD-10-CM

## 2022-08-30 DIAGNOSIS — M79.604 RIGHT LEG PAIN: ICD-10-CM

## 2022-08-30 DIAGNOSIS — D50.9 MICROCYTIC ANEMIA: ICD-10-CM

## 2022-08-30 DIAGNOSIS — E66.3 OVERWEIGHT: ICD-10-CM

## 2022-08-30 DIAGNOSIS — Z79.4 TYPE 2 DIABETES MELLITUS WITHOUT COMPLICATION, WITH LONG-TERM CURRENT USE OF INSULIN: ICD-10-CM

## 2022-08-30 DIAGNOSIS — M79.10 MYALGIA: ICD-10-CM

## 2022-08-30 DIAGNOSIS — E11.9 TYPE 2 DIABETES MELLITUS WITHOUT COMPLICATION, WITH LONG-TERM CURRENT USE OF INSULIN: ICD-10-CM

## 2022-08-30 DIAGNOSIS — E53.8 VITAMIN B12 DEFICIENCY: ICD-10-CM

## 2022-08-30 PROCEDURE — 99214 OFFICE O/P EST MOD 30 MIN: CPT | Performed by: FAMILY MEDICINE

## 2022-09-07 LAB
ALBUMIN SERPL-MCNC: 4.5 G/DL (ref 3.8–4.8)
ALBUMIN/GLOB SERPL: 1.6 {RATIO} (ref 1.2–2.2)
ALP SERPL-CCNC: 85 IU/L (ref 44–121)
ALT SERPL-CCNC: 28 IU/L (ref 0–44)
AST SERPL-CCNC: 19 IU/L (ref 0–40)
BASOPHILS # BLD AUTO: 0.1 X10E3/UL (ref 0–0.2)
BASOPHILS NFR BLD AUTO: 1 %
BILIRUB SERPL-MCNC: 0.3 MG/DL (ref 0–1.2)
BUN SERPL-MCNC: 28 MG/DL (ref 8–27)
BUN/CREAT SERPL: 25 (ref 10–24)
CALCIUM SERPL-MCNC: 9.5 MG/DL (ref 8.6–10.2)
CHLORIDE SERPL-SCNC: 103 MMOL/L (ref 96–106)
CHOLEST SERPL-MCNC: 158 MG/DL (ref 100–199)
CHOLEST/HDLC SERPL: 4.3 RATIO (ref 0–5)
CK SERPL-CCNC: 110 U/L (ref 41–331)
CO2 SERPL-SCNC: 21 MMOL/L (ref 20–29)
CREAT SERPL-MCNC: 1.13 MG/DL (ref 0.76–1.27)
CREAT UR-MCNC: NORMAL MG/DL
EGFRCR-CYS SERPLBLD CKD-EPI 2021: 73 ML/MIN/1.73
EOSINOPHIL # BLD AUTO: 0.4 X10E3/UL (ref 0–0.4)
EOSINOPHIL NFR BLD AUTO: 5 %
ERYTHROCYTE [DISTWIDTH] IN BLOOD BY AUTOMATED COUNT: 13.3 % (ref 11.6–15.4)
GLOBULIN SER CALC-MCNC: 2.8 G/DL (ref 1.5–4.5)
GLUCOSE SERPL-MCNC: 83 MG/DL (ref 65–99)
HBA1C MFR BLD: 7 % (ref 4.8–5.6)
HCT VFR BLD AUTO: 47.9 % (ref 37.5–51)
HDLC SERPL-MCNC: 37 MG/DL
HGB BLD-MCNC: 15.1 G/DL (ref 13–17.7)
IMM GRANULOCYTES # BLD AUTO: 0 X10E3/UL (ref 0–0.1)
IMM GRANULOCYTES NFR BLD AUTO: 0 %
LDLC SERPL CALC-MCNC: 95 MG/DL (ref 0–99)
LYMPHOCYTES # BLD AUTO: 1.9 X10E3/UL (ref 0.7–3.1)
LYMPHOCYTES NFR BLD AUTO: 22 %
MCH RBC QN AUTO: 28.1 PG (ref 26.6–33)
MCHC RBC AUTO-ENTMCNC: 31.5 G/DL (ref 31.5–35.7)
MCV RBC AUTO: 89 FL (ref 79–97)
MICROALBUMIN UR-MCNC: NORMAL
MONOCYTES # BLD AUTO: 0.8 X10E3/UL (ref 0.1–0.9)
MONOCYTES NFR BLD AUTO: 9 %
NEUTROPHILS # BLD AUTO: 5.5 X10E3/UL (ref 1.4–7)
NEUTROPHILS NFR BLD AUTO: 63 %
PLATELET # BLD AUTO: 358 X10E3/UL (ref 150–450)
POTASSIUM SERPL-SCNC: 5 MMOL/L (ref 3.5–5.2)
PROT SERPL-MCNC: 7.3 G/DL (ref 6–8.5)
PSA SERPL-MCNC: 0.3 NG/ML (ref 0–4)
RBC # BLD AUTO: 5.37 X10E6/UL (ref 4.14–5.8)
SODIUM SERPL-SCNC: 142 MMOL/L (ref 134–144)
TRIGL SERPL-MCNC: 148 MG/DL (ref 0–149)
TSH SERPL DL<=0.005 MIU/L-ACNC: 1.91 UIU/ML (ref 0.45–4.5)
VIT B12 SERPL-MCNC: 244 PG/ML (ref 232–1245)
VLDLC SERPL CALC-MCNC: 26 MG/DL (ref 5–40)
WBC # BLD AUTO: 8.7 X10E3/UL (ref 3.4–10.8)

## 2022-09-09 LAB — METHYLMALONATE SERPL-SCNC: 217 NMOL/L (ref 0–378)

## 2022-09-13 ENCOUNTER — OFFICE VISIT (OUTPATIENT)
Dept: FAMILY MEDICINE CLINIC | Facility: CLINIC | Age: 62
End: 2022-09-13

## 2022-09-13 VITALS
OXYGEN SATURATION: 95 % | BODY MASS INDEX: 38.11 KG/M2 | RESPIRATION RATE: 18 BRPM | DIASTOLIC BLOOD PRESSURE: 82 MMHG | TEMPERATURE: 97.7 F | WEIGHT: 266.2 LBS | HEIGHT: 70 IN | SYSTOLIC BLOOD PRESSURE: 156 MMHG | HEART RATE: 88 BPM

## 2022-09-13 DIAGNOSIS — E11.9 TYPE 2 DIABETES MELLITUS WITHOUT COMPLICATION, WITH LONG-TERM CURRENT USE OF INSULIN: ICD-10-CM

## 2022-09-13 DIAGNOSIS — R35.1 BENIGN PROSTATIC HYPERPLASIA WITH NOCTURIA: ICD-10-CM

## 2022-09-13 DIAGNOSIS — M79.10 MYALGIA: ICD-10-CM

## 2022-09-13 DIAGNOSIS — R19.7 DIARRHEA, UNSPECIFIED TYPE: ICD-10-CM

## 2022-09-13 DIAGNOSIS — E16.2 HYPOGLYCEMIA: ICD-10-CM

## 2022-09-13 DIAGNOSIS — M79.604 RIGHT LEG PAIN: ICD-10-CM

## 2022-09-13 DIAGNOSIS — E78.2 MIXED HYPERLIPIDEMIA: ICD-10-CM

## 2022-09-13 DIAGNOSIS — H25.093 AGE-RELATED INCIPIENT CATARACT OF BOTH EYES: ICD-10-CM

## 2022-09-13 DIAGNOSIS — Z79.4 TYPE 2 DIABETES MELLITUS WITHOUT COMPLICATION, WITH LONG-TERM CURRENT USE OF INSULIN: ICD-10-CM

## 2022-09-13 DIAGNOSIS — J30.1 SEASONAL ALLERGIC RHINITIS DUE TO POLLEN: ICD-10-CM

## 2022-09-13 DIAGNOSIS — N18.31 CHRONIC RENAL FAILURE, STAGE 3A: Primary | ICD-10-CM

## 2022-09-13 DIAGNOSIS — R53.83 LETHARGY: ICD-10-CM

## 2022-09-13 DIAGNOSIS — E11.641 UNCONTROLLED TYPE 2 DIABETES MELLITUS WITH HYPOGLYCEMIA AND COMA: ICD-10-CM

## 2022-09-13 DIAGNOSIS — I51.7 LEFT ATRIAL ENLARGEMENT: ICD-10-CM

## 2022-09-13 DIAGNOSIS — H35.372 EPIRETINAL MEMBRANE, LEFT: ICD-10-CM

## 2022-09-13 DIAGNOSIS — F43.9 SITUATIONAL STRESS: ICD-10-CM

## 2022-09-13 DIAGNOSIS — N40.1 BENIGN PROSTATIC HYPERPLASIA WITH NOCTURIA: ICD-10-CM

## 2022-09-13 DIAGNOSIS — Z00.01 ENCOUNTER FOR GENERAL ADULT MEDICAL EXAMINATION WITH ABNORMAL FINDINGS: ICD-10-CM

## 2022-09-13 DIAGNOSIS — E53.8 VITAMIN B12 DEFICIENCY: ICD-10-CM

## 2022-09-13 DIAGNOSIS — E87.5 HYPERKALEMIA: ICD-10-CM

## 2022-09-13 DIAGNOSIS — E66.3 OVERWEIGHT: ICD-10-CM

## 2022-09-13 DIAGNOSIS — E11.3293 TYPE 2 DIABETES MELLITUS WITH BOTH EYES AFFECTED BY MILD NONPROLIFERATIVE RETINOPATHY WITHOUT MACULAR EDEMA, WITHOUT LONG-TERM CURRENT USE OF INSULIN: ICD-10-CM

## 2022-09-13 DIAGNOSIS — I63.9 STROKE WITH CEREBRAL ISCHEMIA: ICD-10-CM

## 2022-09-13 DIAGNOSIS — N40.1 BPH ASSOCIATED WITH NOCTURIA: ICD-10-CM

## 2022-09-13 DIAGNOSIS — Z82.49 FAMILY HISTORY OF ISCHEMIC HEART DISEASE: ICD-10-CM

## 2022-09-13 DIAGNOSIS — R35.1 BPH ASSOCIATED WITH NOCTURIA: ICD-10-CM

## 2022-09-13 DIAGNOSIS — G47.33 OBSTRUCTIVE SLEEP APNEA: ICD-10-CM

## 2022-09-13 DIAGNOSIS — E11.21 DIABETIC NEPHROPATHY ASSOCIATED WITH TYPE 2 DIABETES MELLITUS: ICD-10-CM

## 2022-09-13 DIAGNOSIS — R35.1 NOCTURIA: ICD-10-CM

## 2022-09-13 DIAGNOSIS — D50.9 MICROCYTIC ANEMIA: ICD-10-CM

## 2022-09-13 DIAGNOSIS — D48.9 NEOPLASM, UNCERTAIN WHETHER BENIGN OR MALIGNANT: ICD-10-CM

## 2022-09-13 DIAGNOSIS — C61 MALIGNANT NEOPLASM OF PROSTATE: ICD-10-CM

## 2022-09-13 DIAGNOSIS — I10 HYPERTENSION, BENIGN: ICD-10-CM

## 2022-09-13 DIAGNOSIS — Z12.11 COLON CANCER SCREENING: ICD-10-CM

## 2022-09-13 PROCEDURE — 99396 PREV VISIT EST AGE 40-64: CPT | Performed by: FAMILY MEDICINE

## 2022-09-13 PROCEDURE — 99214 OFFICE O/P EST MOD 30 MIN: CPT | Performed by: FAMILY MEDICINE

## 2022-09-13 RX ORDER — DULAGLUTIDE 1.5 MG/.5ML
1.5 INJECTION, SOLUTION SUBCUTANEOUS WEEKLY
Qty: 9 ML | Refills: 5 | Status: SHIPPED | OUTPATIENT
Start: 2022-09-13 | End: 2023-03-15 | Stop reason: SDUPTHER

## 2022-09-13 RX ORDER — LISINOPRIL 20 MG/1
20 TABLET ORAL 2 TIMES DAILY
Qty: 180 TABLET | Refills: 1 | Status: SHIPPED | OUTPATIENT
Start: 2022-09-13 | End: 2022-09-14 | Stop reason: SDUPTHER

## 2022-09-13 RX ORDER — LANCETS
EACH MISCELLANEOUS
Qty: 102 EACH | Refills: 5 | Status: SHIPPED | OUTPATIENT
Start: 2022-09-13 | End: 2022-09-14 | Stop reason: SDUPTHER

## 2022-09-13 RX ORDER — LOVASTATIN 40 MG/1
40 TABLET ORAL EVERY EVENING
Qty: 90 TABLET | Refills: 1 | Status: SHIPPED | OUTPATIENT
Start: 2022-09-13 | End: 2022-09-14 | Stop reason: SDUPTHER

## 2022-09-13 RX ORDER — CLOPIDOGREL BISULFATE 75 MG/1
75 TABLET ORAL DAILY
Qty: 90 TABLET | Refills: 1 | Status: SHIPPED | OUTPATIENT
Start: 2022-09-13 | End: 2023-03-15 | Stop reason: SDUPTHER

## 2022-09-14 PROBLEM — N18.31 CHRONIC RENAL FAILURE, STAGE 3A (HCC): Status: ACTIVE | Noted: 2022-09-14

## 2022-09-14 RX ORDER — HUMAN INSULIN 100 [IU]/ML
INJECTION, SUSPENSION SUBCUTANEOUS
Qty: 30 ML | Refills: 12
Start: 2022-09-14 | End: 2023-03-15 | Stop reason: SDUPTHER

## 2022-09-14 RX ORDER — LANCETS
EACH MISCELLANEOUS
Qty: 102 EACH | Refills: 5
Start: 2022-09-14

## 2022-09-14 RX ORDER — LOVASTATIN 40 MG/1
40 TABLET ORAL EVERY EVENING
Qty: 90 TABLET | Refills: 1
Start: 2022-09-14 | End: 2023-03-15 | Stop reason: SDUPTHER

## 2022-09-14 RX ORDER — HUMAN INSULIN 100 [IU]/ML
30 INJECTION, SOLUTION SUBCUTANEOUS DAILY
Qty: 1 EACH | Refills: 12
Start: 2022-09-14 | End: 2023-03-15 | Stop reason: SDUPTHER

## 2022-09-14 RX ORDER — AMLODIPINE BESYLATE 5 MG/1
5 TABLET ORAL DAILY
Qty: 90 TABLET | Refills: 3
Start: 2022-09-14 | End: 2023-03-15 | Stop reason: SDUPTHER

## 2022-09-14 RX ORDER — LISINOPRIL 20 MG/1
20 TABLET ORAL 2 TIMES DAILY
Qty: 180 TABLET | Refills: 1
Start: 2022-09-14 | End: 2022-11-07

## 2022-09-14 RX ORDER — FLUTICASONE PROPIONATE 50 MCG
2 SPRAY, SUSPENSION (ML) NASAL DAILY
Qty: 16 G | Refills: 3
Start: 2022-09-14

## 2022-09-14 RX ORDER — METOPROLOL SUCCINATE 50 MG/1
50 TABLET, EXTENDED RELEASE ORAL DAILY
Qty: 90 TABLET | Refills: 3 | Status: SHIPPED | OUTPATIENT
Start: 2022-09-14 | End: 2022-12-13 | Stop reason: SDUPTHER

## 2022-09-14 RX ORDER — SYRINGE AND NEEDLE,INSULIN,1ML 31GX15/64"
80 SYRINGE, EMPTY DISPOSABLE MISCELLANEOUS 3 TIMES DAILY
Qty: 100 EACH | Refills: 12
Start: 2022-09-14 | End: 2023-03-15 | Stop reason: SDUPTHER

## 2022-09-14 RX ORDER — LORATADINE 10 MG/1
10 TABLET ORAL DAILY
Qty: 90 TABLET | Refills: 0
Start: 2022-09-14

## 2022-09-14 RX ORDER — SYRINGE AND NEEDLE,INSULIN,1ML 31GX15/64"
1 SYRINGE, EMPTY DISPOSABLE MISCELLANEOUS 2 TIMES DAILY
Qty: 90 EACH | Refills: 0
Start: 2022-09-14

## 2022-09-16 ENCOUNTER — TELEPHONE (OUTPATIENT)
Dept: FAMILY MEDICINE CLINIC | Facility: CLINIC | Age: 62
End: 2022-09-16

## 2022-09-16 NOTE — TELEPHONE ENCOUNTER
Gamaliel of [facility] received a referral faxed from the [place] office requesting  to see patient.  is needing office visit note or pathology faxed to [phone].

## 2022-11-04 DIAGNOSIS — I10 HYPERTENSION, BENIGN: ICD-10-CM

## 2022-11-07 RX ORDER — LISINOPRIL 20 MG/1
TABLET ORAL
Qty: 180 TABLET | Refills: 0 | Status: SHIPPED | OUTPATIENT
Start: 2022-11-07 | End: 2023-03-15 | Stop reason: SDUPTHER

## 2022-12-08 LAB
ALBUMIN SERPL-MCNC: 4.2 G/DL (ref 3.8–4.8)
ALBUMIN/CREAT UR: 397 MG/G CREAT (ref 0–29)
ALBUMIN/GLOB SERPL: 1.6 {RATIO} (ref 1.2–2.2)
ALP SERPL-CCNC: 86 IU/L (ref 44–121)
ALT SERPL-CCNC: 39 IU/L (ref 0–44)
AST SERPL-CCNC: 25 IU/L (ref 0–40)
BILIRUB SERPL-MCNC: 0.3 MG/DL (ref 0–1.2)
BUN SERPL-MCNC: 21 MG/DL (ref 8–27)
BUN/CREAT SERPL: 18 (ref 10–24)
CALCIUM SERPL-MCNC: 9.1 MG/DL (ref 8.6–10.2)
CHLORIDE SERPL-SCNC: 103 MMOL/L (ref 96–106)
CHOLEST SERPL-MCNC: 132 MG/DL (ref 100–199)
CHOLEST/HDLC SERPL: 3.7 RATIO (ref 0–5)
CO2 SERPL-SCNC: 24 MMOL/L (ref 20–29)
CREAT SERPL-MCNC: 1.15 MG/DL (ref 0.76–1.27)
CREAT UR-MCNC: 89.6 MG/DL
EGFRCR SERPLBLD CKD-EPI 2021: 71 ML/MIN/1.73
GLOBULIN SER CALC-MCNC: 2.6 G/DL (ref 1.5–4.5)
GLUCOSE SERPL-MCNC: 138 MG/DL (ref 70–99)
HBA1C MFR BLD: 6.7 % (ref 4.8–5.6)
HDLC SERPL-MCNC: 36 MG/DL
LDLC SERPL CALC-MCNC: 75 MG/DL (ref 0–99)
MICROALBUMIN UR-MCNC: 355.4 UG/ML
POTASSIUM SERPL-SCNC: 5.2 MMOL/L (ref 3.5–5.2)
PROT SERPL-MCNC: 6.8 G/DL (ref 6–8.5)
SODIUM SERPL-SCNC: 139 MMOL/L (ref 134–144)
TRIGL SERPL-MCNC: 118 MG/DL (ref 0–149)
VLDLC SERPL CALC-MCNC: 21 MG/DL (ref 5–40)

## 2022-12-13 ENCOUNTER — OFFICE VISIT (OUTPATIENT)
Dept: FAMILY MEDICINE CLINIC | Facility: CLINIC | Age: 62
End: 2022-12-13

## 2022-12-13 VITALS
HEIGHT: 70 IN | HEART RATE: 81 BPM | DIASTOLIC BLOOD PRESSURE: 78 MMHG | RESPIRATION RATE: 15 BRPM | BODY MASS INDEX: 39.03 KG/M2 | WEIGHT: 272.6 LBS | SYSTOLIC BLOOD PRESSURE: 139 MMHG | TEMPERATURE: 97.2 F | OXYGEN SATURATION: 96 %

## 2022-12-13 DIAGNOSIS — Z79.4 TYPE 2 DIABETES MELLITUS WITHOUT COMPLICATION, WITH LONG-TERM CURRENT USE OF INSULIN: ICD-10-CM

## 2022-12-13 DIAGNOSIS — E78.2 MIXED HYPERLIPIDEMIA: Primary | ICD-10-CM

## 2022-12-13 DIAGNOSIS — E11.9 TYPE 2 DIABETES MELLITUS WITHOUT COMPLICATION, WITH LONG-TERM CURRENT USE OF INSULIN: ICD-10-CM

## 2022-12-13 DIAGNOSIS — E66.3 OVERWEIGHT: ICD-10-CM

## 2022-12-13 DIAGNOSIS — E11.3293 TYPE 2 DIABETES MELLITUS WITH BOTH EYES AFFECTED BY MILD NONPROLIFERATIVE RETINOPATHY WITHOUT MACULAR EDEMA, WITHOUT LONG-TERM CURRENT USE OF INSULIN: ICD-10-CM

## 2022-12-13 DIAGNOSIS — I10 HYPERTENSION, BENIGN: ICD-10-CM

## 2022-12-13 PROCEDURE — 99214 OFFICE O/P EST MOD 30 MIN: CPT | Performed by: FAMILY MEDICINE

## 2022-12-13 RX ORDER — METOPROLOL SUCCINATE 50 MG/1
50 TABLET, EXTENDED RELEASE ORAL DAILY
Qty: 90 TABLET | Refills: 3 | Status: SHIPPED | OUTPATIENT
Start: 2022-12-13 | End: 2023-03-15 | Stop reason: SDUPTHER

## 2022-12-13 NOTE — PROGRESS NOTES
Subjective   Thor Crouch is a 62 y.o. male.     Hypertension  This is a chronic problem. The current episode started more than 1 year ago. The problem has been gradually improving since onset. The problem is controlled. Pertinent negatives include no chest pain, palpitations or shortness of breath. Risk factors for coronary artery disease include dyslipidemia and diabetes mellitus.   Hyperlipidemia  This is a chronic problem. The current episode started more than 1 year ago. The problem is controlled. Pertinent negatives include no chest pain, myalgias or shortness of breath. Current antihyperlipidemic treatment includes statins. Risk factors for coronary artery disease include dyslipidemia, diabetes mellitus and hypertension.   Diabetes  He presents for his follow-up diabetic visit. He has type 2 diabetes mellitus. His disease course has been improving. There are no hypoglycemic associated symptoms. There are no diabetic associated symptoms. Pertinent negatives for diabetes include no chest pain, no fatigue, no polyphagia, no polyuria and no weight loss. There are no hypoglycemic complications. There are no diabetic complications. Risk factors for coronary artery disease include dyslipidemia, diabetes mellitus and hypertension.        The following portions of the patient's history were reviewed and updated as appropriate: current medications, past family history, past medical history, past social history, past surgical history and problem list.    Family History   Problem Relation Age of Onset   • Arthritis Mother    • Heart disease Mother         ischemic   • Goiter Father    • Heart disease Father         ischemic   • Heart attack Father    • Diabetes Sister         diabetes mellitus   • Diabetes Brother         diabetes mellitus   • Diabetes Maternal Uncle         diabetes mellitus   • Diabetes Paternal Grandmother         diabetes mellitus   • Stroke Paternal Grandfather    • Kidney failure Other         Uncle    • Arthritis Other         grandmother       Social History     Tobacco Use   • Smoking status: Never   • Smokeless tobacco: Never   Substance Use Topics   • Alcohol use: No   • Drug use: No       Past Surgical History:   Procedure Laterality Date   • PROSTATE SURGERY         Patient Active Problem List   Diagnosis   • Cataract of both eyes   • Diabetic nephropathy associated with type 2 diabetes mellitus (HCC)   • Family history of ischemic heart disease   • Hyperlipidemia   • Hypertension, benign   • Left atrial enlargement   • Obstructive sleep apnea   • Overweight   • Seasonal allergic rhinitis due to pollen   • Situational stress   • Type 2 diabetes mellitus with both eyes affected by mild nonproliferative retinopathy without macular edema, without long-term current use of insulin (HCC)   • Vitamin B12 deficiency   • Type 2 diabetes mellitus without complication, with long-term current use of insulin (HCC)   • Stroke with cerebral ischemia (HCC)   • Malignant neoplasm of prostate (HCC)   • Neoplasm, uncertain whether benign or malignant   • Hypoglycemia   • Encounter for general adult medical examination with abnormal findings   • Lethargy   • Right leg pain   • Colon cancer screening   • Chronic renal failure, stage 3a (HCC)       Current Outpatient Medications on File Prior to Visit   Medication Sig Dispense Refill   • Accu-Chek FastClix Lancets misc Check blood sugar 3 x daily 102 each 5   • amLODIPine (NORVASC) 5 MG tablet Take 1 tablet by mouth Daily. 90 tablet 3   • clopidogrel (PLAVIX) 75 MG tablet Take 1 tablet by mouth Daily. 90 tablet 1   • Dulaglutide (Trulicity) 1.5 MG/0.5ML solution pen-injector Inject 1.5 mg under the skin into the appropriate area as directed 1 (One) Time Per Week. 9 mL 5   • fluticasone (Flonase Allergy Relief) 50 MCG/ACT nasal spray 2 sprays by Each Nare route Daily. Shake well before using. 16 g 3   • insulin NPH (NovoLIN N ReliOn) 100 UNIT/ML injection Inject 90 units under  "the skin at hs 30 mL 12   • insulin regular (NovoLIN R) 100 UNIT/ML injection Inject 30 Units under the skin into the appropriate area as directed Daily. Take within 30 minutes of beginning largest  meal. 1 each 12   • Insulin Syringe-Needle U-100 (ReliOn Insulin Syringe) 31G X 15/64\" 1 ML misc Inject 1 syringe under the skin into the appropriate area as directed 2 (Two) Times a Day. 90 each 0   • lisinopril (PRINIVIL,ZESTRIL) 20 MG tablet TAKE 1 TABLET BY MOUTH TWICE DAILY 180 tablet 0   • loratadine (Claritin) 10 MG tablet Take 1 tablet by mouth Daily. 90 tablet 0   • lovastatin (MEVACOR) 40 MG tablet Take 1 tablet by mouth Every Evening. 90 tablet 1   • metFORMIN (GLUCOPHAGE) 500 MG tablet Take 1 tablet by mouth 2 (Two) Times a Day. 180 tablet 3   • ReliOn Insulin Syringe 31G X 15/64\" 1 ML misc Inject 80 Units under the skin into the appropriate area as directed 3 (Three) Times a Day. 100 each 12     Current Facility-Administered Medications on File Prior to Visit   Medication Dose Route Frequency Provider Last Rate Last Admin   • cyanocobalamin injection 1,000 mcg  1,000 mcg Intramuscular Q28 Days Hayes Sharp MD   1,000 mcg at 07/22/20 1727   • cyanocobalamin injection 1,000 mcg  1,000 mcg Intramuscular Q28 Days Hayes Sharp MD   1,000 mcg at 11/02/20 1725       Allergies   Allergen Reactions   • Sulfa Antibiotics Dizziness       Review of Systems   Constitutional: Negative for fatigue, unexpected weight gain and unexpected weight loss.   Eyes: Negative for visual disturbance.   Respiratory: Negative for shortness of breath.    Cardiovascular: Negative for chest pain, palpitations and leg swelling.   Gastrointestinal: Negative for nausea.   Endocrine: Negative for polyphagia and polyuria.   Genitourinary: Negative for frequency.   Musculoskeletal: Negative for myalgias.   Skin: Negative for dry skin and skin lesions.   Neurological: Negative for syncope, numbness and headache.       Objective   Visit " "Vitals  /78 (BP Location: Left arm, Patient Position: Sitting, Cuff Size: Adult)   Pulse 81   Temp 97.2 °F (36.2 °C)   Resp 15   Ht 177.8 cm (70\")   Wt 124 kg (272 lb 9.6 oz)   SpO2 96%   BMI 39.11 kg/m²     Physical Exam  Vitals and nursing note reviewed.   Constitutional:       Appearance: He is well-developed.   HENT:      Head: Normocephalic.   Neck:      Thyroid: No thyromegaly.      Vascular: No carotid bruit.      Trachea: Trachea normal.   Cardiovascular:      Rate and Rhythm: Normal rate and regular rhythm.      Heart sounds: No murmur heard.    No friction rub. No gallop.   Pulmonary:      Effort: Pulmonary effort is normal. No respiratory distress.      Breath sounds: Normal breath sounds. No wheezing.   Chest:      Chest wall: No tenderness.   Musculoskeletal:      Cervical back: Neck supple.   Skin:     General: Skin is dry.      Findings: No rash.      Nails: There is no clubbing.   Neurological:      Mental Status: He is alert and oriented to person, place, and time.   Psychiatric:         Behavior: Behavior is cooperative.           Assessment & Plan .  Problem List Items Addressed This Visit        Medium    Hyperlipidemia - Primary    Overview     LDL goal less than 70         Current Assessment & Plan     greatly Improved. Chol 132 down from 158, trig 118 down from 148, HDL 36 down from 37, LDL 75 down from 95  Encouraged to watch fatty intake, exercise more, and lose weight.   compliant with medication   Patient tolerated lovastatin well without side effects. I feel the benefits of the medication outweigh the risks.  Is not getting adequate diet and exercise  Goals developed at last visit were met   Follow up in 3  months  Care management needs are self-addressed.  Self-management abilities addressed and patient is capable of managing his own disease.           Relevant Orders    Lipid Panel With / Chol / HDL Ratio    Comprehensive Metabolic Panel    Hypertension, benign    Current " Assessment & Plan     Doing well; Encouraged to watch salt, exercise more and lose weight.  Patient tolerated norvasc, lisinopril, metoprolol well without side effects. I feel the benefits of the medication outweigh the risks.           Relevant Medications    metoprolol succinate XL (TOPROL-XL) 50 MG 24 hr tablet    Type 2 diabetes mellitus with both eyes affected by mild nonproliferative retinopathy without macular edema, without long-term current use of insulin (HCC)    Current Assessment & Plan     Improved,  Hgb a1c 6.7 down from 7.0  Encouraged to watch sugar intake, exercise more and lose weight.   compliant with medication.  Patient tolerated trulicity. novolin N, Novolin R, Metformin well without side effects. I feel the benefits of the medication outweigh the risks.  monitoring sugar at home.   Follow up in 3  months  Care management needs are self-addressed.    Self-management abilities addressed and patient is capable of managing his own disease.  Microalbumin-creatinine ratio worsening-will recheck with next labs and if still elevated may need to see nephrologist.          Relevant Orders    Hemoglobin A1c    Microalbumin / Creatinine Urine Ratio - Urine, Clean Catch    Type 2 diabetes mellitus without complication, with long-term current use of insulin (HCC)    Relevant Medications    glucose blood (Accu-Chek Guide) test strip       Unprioritized    Overweight    Current Assessment & Plan     Patient's (Body mass index is 39.11 kg/m².) indicates that they are overweight with health conditions that include hypertension and coronary heart disease . Weight is unchanged. BMI is is above average; BMI management plan is completed. We discussed low calorie, low carb based diet program, portion control and increasing exercise.

## 2022-12-13 NOTE — ASSESSMENT & PLAN NOTE
Improved,  Hgb a1c 6.7 down from 7.0  Encouraged to watch sugar intake, exercise more and lose weight.   compliant with medication.  Patient tolerated trulicity. novolin N, Novolin R, Metformin well without side effects. I feel the benefits of the medication outweigh the risks.  monitoring sugar at home.   Follow up in 3  months  Care management needs are self-addressed.    Self-management abilities addressed and patient is capable of managing his own disease.  Microalbumin-creatinine ratio worsening-will recheck with next labs and if still elevated may need to see nephrologist.

## 2022-12-13 NOTE — ASSESSMENT & PLAN NOTE
Patient's (Body mass index is 39.11 kg/m².) indicates that they are overweight with health conditions that include hypertension and coronary heart disease . Weight is unchanged. BMI is is above average; BMI management plan is completed. We discussed low calorie, low carb based diet program, portion control and increasing exercise.

## 2022-12-13 NOTE — ASSESSMENT & PLAN NOTE
greatly Improved. Chol 132 down from 158, trig 118 down from 148, HDL 36 down from 37, LDL 75 down from 95  Encouraged to watch fatty intake, exercise more, and lose weight.   compliant with medication   Patient tolerated lovastatin well without side effects. I feel the benefits of the medication outweigh the risks.  Is not getting adequate diet and exercise  Goals developed at last visit were met   Follow up in 3  months  Care management needs are self-addressed.  Self-management abilities addressed and patient is capable of managing his own disease.

## 2022-12-13 NOTE — ASSESSMENT & PLAN NOTE
Doing well; Encouraged to watch salt, exercise more and lose weight.  Patient tolerated norvasc, lisinopril, metoprolol well without side effects. I feel the benefits of the medication outweigh the risks.

## 2023-01-26 ENCOUNTER — OFFICE VISIT (OUTPATIENT)
Dept: NEUROLOGY | Facility: CLINIC | Age: 63
End: 2023-01-26
Payer: COMMERCIAL

## 2023-01-26 VITALS
WEIGHT: 273 LBS | SYSTOLIC BLOOD PRESSURE: 167 MMHG | TEMPERATURE: 98 F | DIASTOLIC BLOOD PRESSURE: 83 MMHG | BODY MASS INDEX: 39.08 KG/M2 | HEIGHT: 70 IN | HEART RATE: 77 BPM

## 2023-01-26 DIAGNOSIS — G47.33 OBSTRUCTIVE SLEEP APNEA: Primary | ICD-10-CM

## 2023-01-26 PROCEDURE — 99213 OFFICE O/P EST LOW 20 MIN: CPT | Performed by: PSYCHIATRY & NEUROLOGY

## 2023-01-26 NOTE — LETTER
January 26, 2023     Hayes Sharp MD  [address] [place] [address] [address]  [address]  [place] [id number]    Patient: Thor Crouch   YOB: 1960   Date of Visit: 1/26/2023       Dear Dr. Aron MD:    Thank you for referring Thor Crouch to me for evaluation. Below are the relevant portions of my assessment and plan of care.    If you have questions, please do not hesitate to call me. I look forward to following Thor along with you.         Sincerely,        Joseph F Seipel, MD        CC: No Recipients  Seipel, Joseph F, MD  01/26/23 1647  Signed  Chief Complaint  Sleep Apnea    Subjective           Thor Crouch presents to [facility] NEUROLOGY  History of Present Illness  Yearly f/u for CPAP compliance, doing well with pap therapy. He wears a nasal mask and gets supplies on line.      SLEEP TESTING HISTORY:    On NPSG at [facility] , 10/12/2018 patient had Moderate obstructive sleep apnea syndrome with apnea-hypopnea index of 17.2 per sleep hour, minimum SpO2 of 82%  PAP download:  The patient is on CPAP therapy at 9-14 cm/H2O.   Data indicates Excellent compliance. With 98% usage for more than 4 hours with an average usage of 9 hours 36 minutes. AHI down to 1.4 .  Average pressures 9.3.  Average large leak 1min.     The patient's hypersomnia has resolved       [place] Sleepiness Scale:  Sitting and reading 1 WatchingTV 1  Sitting, inactive, in a public place 0  As a passenger in a car for 1 hour w/o a break  0  Lying down to rest in the afternoon  2  Sitting and talking to someone  0  Sitting quietly after a lunch  0  In a car, while stopped for traffic or a light  0  Total 4    Review of Systems   Constitutional: Negative.    HENT: Negative.    Eyes: Positive for visual disturbance. Negative for photophobia.   Respiratory: Positive for apnea. Negative for cough.    Cardiovascular: Negative.    Gastrointestinal: Negative.    Endocrine: Negative.    Genitourinary: Negative.    Musculoskeletal: Negative.   "  Allergic/Immunologic: Negative.    Neurological: Negative.    Hematological: Negative.    Psychiatric/Behavioral: Negative.      Objective    Vital Signs:   /83 (BP Location: Right arm, Patient Position: Sitting, Cuff Size: Adult)   Pulse 77   Temp 98 °F (36.7 °C)   Ht 177.8 cm (70\")   Wt 124 kg (273 lb)   BMI 39.17 kg/m²     Physical Exam  Vitals reviewed.   Constitutional:       Appearance: Normal appearance.   HENT:      Right Ear: Tympanic membrane normal.      Nose: Nose normal.   Cardiovascular:      Pulses: Normal pulses.   Pulmonary:      Effort: Pulmonary effort is normal. No respiratory distress.   Neurological:      General: No focal deficit present.      Mental Status: He is alert and oriented to person, place, and time.   Psychiatric:         Mood and Affect: Mood normal.        Result Review :                Assessment and Plan    Diagnoses and all orders for this visit:    1. Obstructive sleep apnea (Primary)      Continue CPAP at 9-14    The patient is compliant with and benefiting from PAP therapy.      Follow Up   Return in about 1 year (around 1/26/2024).    Patient was given instructions and counseling regarding his condition or for health maintenance advice. Please see specific information pulled into the AVS if appropriate.       This document has been electronically signed by Joseph Seipel, MD on January 26, 2023 16:42 EST    "

## 2023-01-26 NOTE — PROGRESS NOTES
"Chief Complaint  Sleep Apnea    Subjective          Thor Crouch presents to [facility] NEUROLOGY  History of Present Illness  Yearly f/u for CPAP compliance, doing well with pap therapy. He wears a nasal mask and gets supplies on line.      SLEEP TESTING HISTORY:    On NPSG at [facility] , 10/12/2018 patient had Moderate obstructive sleep apnea syndrome with apnea-hypopnea index of 17.2 per sleep hour, minimum SpO2 of 82%  PAP download:  The patient is on CPAP therapy at 9-14 cm/H2O.   Data indicates Excellent compliance. With 98% usage for more than 4 hours with an average usage of 9 hours 36 minutes. AHI down to 1.4 .  Average pressures 9.3.  Average large leak 1min.     The patient's hypersomnia has resolved       [place] Sleepiness Scale:  Sitting and reading 1 WatchingTV 1  Sitting, inactive, in a public place 0  As a passenger in a car for 1 hour w/o a break  0  Lying down to rest in the afternoon  2  Sitting and talking to someone  0  Sitting quietly after a lunch  0  In a car, while stopped for traffic or a light  0  Total 4    Review of Systems   Constitutional: Negative.    HENT: Negative.    Eyes: Positive for visual disturbance. Negative for photophobia.   Respiratory: Positive for apnea. Negative for cough.    Cardiovascular: Negative.    Gastrointestinal: Negative.    Endocrine: Negative.    Genitourinary: Negative.    Musculoskeletal: Negative.    Allergic/Immunologic: Negative.    Neurological: Negative.    Hematological: Negative.    Psychiatric/Behavioral: Negative.      Objective   Vital Signs:   /83 (BP Location: Right arm, Patient Position: Sitting, Cuff Size: Adult)   Pulse 77   Temp 98 °F (36.7 °C)   Ht 177.8 cm (70\")   Wt 124 kg (273 lb)   BMI 39.17 kg/m²     Physical Exam  Vitals reviewed.   Constitutional:       Appearance: Normal appearance.   HENT:      Right Ear: Tympanic membrane normal.      Nose: Nose normal.   Cardiovascular:      Pulses: Normal pulses. "   Pulmonary:      Effort: Pulmonary effort is normal. No respiratory distress.   Neurological:      General: No focal deficit present.      Mental Status: He is alert and oriented to person, place, and time.   Psychiatric:         Mood and Affect: Mood normal.        Result Review :                 Assessment and Plan    Diagnoses and all orders for this visit:    1. Obstructive sleep apnea (Primary)      Continue CPAP at 9-14    The patient is compliant with and benefiting from PAP therapy.      Follow Up   Return in about 1 year (around 1/26/2024).    Patient was given instructions and counseling regarding his condition or for health maintenance advice. Please see specific information pulled into the AVS if appropriate.       This document has been electronically signed by Joseph Seipel, MD on January 26, 2023 16:42 EST

## 2023-03-05 LAB
ALBUMIN SERPL-MCNC: 4.5 G/DL (ref 3.8–4.8)
ALBUMIN/CREAT UR: 199 MG/G CREAT (ref 0–29)
ALBUMIN/GLOB SERPL: 1.8 {RATIO} (ref 1.2–2.2)
ALP SERPL-CCNC: 82 IU/L (ref 44–121)
ALT SERPL-CCNC: 45 IU/L (ref 0–44)
AST SERPL-CCNC: 29 IU/L (ref 0–40)
BILIRUB SERPL-MCNC: 0.4 MG/DL (ref 0–1.2)
BUN SERPL-MCNC: 20 MG/DL (ref 8–27)
BUN/CREAT SERPL: 18 (ref 10–24)
CALCIUM SERPL-MCNC: 9.1 MG/DL (ref 8.6–10.2)
CHLORIDE SERPL-SCNC: 104 MMOL/L (ref 96–106)
CHOLEST SERPL-MCNC: 148 MG/DL (ref 100–199)
CHOLEST/HDLC SERPL: 4.2 RATIO (ref 0–5)
CO2 SERPL-SCNC: 24 MMOL/L (ref 20–29)
CREAT SERPL-MCNC: 1.09 MG/DL (ref 0.76–1.27)
CREAT UR-MCNC: 101.9 MG/DL
EGFRCR SERPLBLD CKD-EPI 2021: 76 ML/MIN/1.73
GLOBULIN SER CALC-MCNC: 2.5 G/DL (ref 1.5–4.5)
GLUCOSE SERPL-MCNC: 149 MG/DL (ref 70–99)
HBA1C MFR BLD: 6.9 % (ref 4.8–5.6)
HDLC SERPL-MCNC: 35 MG/DL
LDLC SERPL CALC-MCNC: 93 MG/DL (ref 0–99)
MICROALBUMIN UR-MCNC: 202.6 UG/ML
POTASSIUM SERPL-SCNC: 4.9 MMOL/L (ref 3.5–5.2)
PROT SERPL-MCNC: 7 G/DL (ref 6–8.5)
SODIUM SERPL-SCNC: 142 MMOL/L (ref 134–144)
TRIGL SERPL-MCNC: 109 MG/DL (ref 0–149)
VLDLC SERPL CALC-MCNC: 20 MG/DL (ref 5–40)

## 2023-03-15 ENCOUNTER — OFFICE VISIT (OUTPATIENT)
Dept: FAMILY MEDICINE CLINIC | Facility: CLINIC | Age: 63
End: 2023-03-15
Payer: COMMERCIAL

## 2023-03-15 VITALS
OXYGEN SATURATION: 96 % | WEIGHT: 273 LBS | RESPIRATION RATE: 18 BRPM | SYSTOLIC BLOOD PRESSURE: 172 MMHG | BODY MASS INDEX: 39.08 KG/M2 | DIASTOLIC BLOOD PRESSURE: 100 MMHG | HEART RATE: 91 BPM | HEIGHT: 70 IN | TEMPERATURE: 97.1 F

## 2023-03-15 DIAGNOSIS — E78.2 MIXED HYPERLIPIDEMIA: Primary | ICD-10-CM

## 2023-03-15 DIAGNOSIS — Z79.4 TYPE 2 DIABETES MELLITUS WITHOUT COMPLICATION, WITH LONG-TERM CURRENT USE OF INSULIN: ICD-10-CM

## 2023-03-15 DIAGNOSIS — N18.31 CHRONIC RENAL FAILURE, STAGE 3A: ICD-10-CM

## 2023-03-15 DIAGNOSIS — E11.3293 TYPE 2 DIABETES MELLITUS WITH BOTH EYES AFFECTED BY MILD NONPROLIFERATIVE RETINOPATHY WITHOUT MACULAR EDEMA, WITHOUT LONG-TERM CURRENT USE OF INSULIN: ICD-10-CM

## 2023-03-15 DIAGNOSIS — F43.9 SITUATIONAL STRESS: ICD-10-CM

## 2023-03-15 DIAGNOSIS — R79.89 ELEVATED LIVER FUNCTION TESTS: ICD-10-CM

## 2023-03-15 DIAGNOSIS — I51.7 LEFT ATRIAL ENLARGEMENT: ICD-10-CM

## 2023-03-15 DIAGNOSIS — E11.9 TYPE 2 DIABETES MELLITUS WITHOUT COMPLICATION, WITH LONG-TERM CURRENT USE OF INSULIN: ICD-10-CM

## 2023-03-15 DIAGNOSIS — I10 HYPERTENSION, BENIGN: ICD-10-CM

## 2023-03-15 DIAGNOSIS — E11.21 DIABETIC NEPHROPATHY ASSOCIATED WITH TYPE 2 DIABETES MELLITUS: ICD-10-CM

## 2023-03-15 PROCEDURE — 99214 OFFICE O/P EST MOD 30 MIN: CPT | Performed by: FAMILY MEDICINE

## 2023-03-15 RX ORDER — AMLODIPINE BESYLATE 5 MG/1
5 TABLET ORAL DAILY
Qty: 90 TABLET | Refills: 3 | Status: SHIPPED | OUTPATIENT
Start: 2023-03-15 | End: 2023-04-04 | Stop reason: SDUPTHER

## 2023-03-15 RX ORDER — SYRINGE AND NEEDLE,INSULIN,1ML 31GX15/64"
80 SYRINGE, EMPTY DISPOSABLE MISCELLANEOUS 3 TIMES DAILY
Qty: 100 EACH | Refills: 12 | Status: SHIPPED | OUTPATIENT
Start: 2023-03-15

## 2023-03-15 RX ORDER — LISINOPRIL 20 MG/1
20 TABLET ORAL 2 TIMES DAILY
Qty: 180 TABLET | Refills: 1 | Status: SHIPPED | OUTPATIENT
Start: 2023-03-15 | End: 2023-04-04 | Stop reason: SDUPTHER

## 2023-03-15 RX ORDER — CITALOPRAM 10 MG/1
10 TABLET ORAL DAILY
Qty: 30 TABLET | Refills: 5 | Status: SHIPPED | OUTPATIENT
Start: 2023-03-15

## 2023-03-15 RX ORDER — HUMAN INSULIN 100 [IU]/ML
30 INJECTION, SOLUTION SUBCUTANEOUS DAILY
Qty: 1 EACH | Refills: 12 | Status: SHIPPED | OUTPATIENT
Start: 2023-03-15

## 2023-03-15 RX ORDER — METFORMIN HYDROCHLORIDE EXTENDED-RELEASE TABLETS 1000 MG/1
1000 TABLET, FILM COATED, EXTENDED RELEASE ORAL
Qty: 180 TABLET | Refills: 1 | Status: SHIPPED | OUTPATIENT
Start: 2023-03-15

## 2023-03-15 RX ORDER — DULAGLUTIDE 1.5 MG/.5ML
1.5 INJECTION, SOLUTION SUBCUTANEOUS WEEKLY
Qty: 9 ML | Refills: 5 | Status: SHIPPED | OUTPATIENT
Start: 2023-03-15

## 2023-03-15 RX ORDER — METOPROLOL SUCCINATE 50 MG/1
50 TABLET, EXTENDED RELEASE ORAL DAILY
Qty: 90 TABLET | Refills: 3 | Status: SHIPPED | OUTPATIENT
Start: 2023-03-15 | End: 2023-04-04 | Stop reason: SDUPTHER

## 2023-03-15 RX ORDER — CLOPIDOGREL BISULFATE 75 MG/1
75 TABLET ORAL DAILY
Qty: 90 TABLET | Refills: 1 | Status: SHIPPED | OUTPATIENT
Start: 2023-03-15

## 2023-03-15 RX ORDER — HUMAN INSULIN 100 [IU]/ML
INJECTION, SUSPENSION SUBCUTANEOUS
Qty: 30 ML | Refills: 12 | Status: SHIPPED | OUTPATIENT
Start: 2023-03-15

## 2023-03-15 RX ORDER — LOVASTATIN 40 MG/1
40 TABLET ORAL EVERY EVENING
Qty: 90 TABLET | Refills: 1 | Status: SHIPPED | OUTPATIENT
Start: 2023-03-15

## 2023-03-15 NOTE — PROGRESS NOTES
Subjective   Thor Crouch is a 62 y.o. male.     Diabetes  He presents for his follow-up diabetic visit. He has type 2 diabetes mellitus. His disease course has been worsening. There are no hypoglycemic associated symptoms. Pertinent negatives for diabetes include no chest pain, no fatigue, no polyphagia, no polyuria and no weight loss. There are no hypoglycemic complications. Symptoms are stable. There are no diabetic complications. Risk factors for coronary artery disease include dyslipidemia, diabetes mellitus, hypertension and obesity. Current diabetic treatment includes oral agent (monotherapy) and insulin injections.   Hypertension  This is a chronic problem. The current episode started more than 1 year ago. The problem has been gradually worsening since onset. The problem is controlled. Pertinent negatives include no chest pain, palpitations or shortness of breath.   Hyperlipidemia  This is a chronic problem. The current episode started more than 1 year ago. The problem is controlled. Recent lipid tests were reviewed and are high. Exacerbating diseases include diabetes. Factors aggravating his hyperlipidemia include fatty foods. Pertinent negatives include no chest pain, myalgias or shortness of breath. Current antihyperlipidemic treatment includes statins. The current treatment provides no improvement of lipids.        The following portions of the patient's history were reviewed and updated as appropriate: allergies, current medications, past family history, past medical history, past social history, past surgical history and problem list.    Family History   Problem Relation Age of Onset   • Arthritis Mother    • Heart disease Mother         ischemic   • Goiter Father    • Heart disease Father         ischemic   • Heart attack Father    • Diabetes Sister         diabetes mellitus   • Diabetes Brother         diabetes mellitus   • Diabetes Maternal Uncle         diabetes mellitus   • Diabetes Paternal  "Grandmother         diabetes mellitus   • Stroke Paternal Grandfather    • Kidney failure Other         Uncle   • Arthritis Other         grandmother       Social History     Tobacco Use   • Smoking status: Never   • Smokeless tobacco: Never   Substance Use Topics   • Alcohol use: No   • Drug use: No       Past Surgical History:   Procedure Laterality Date   • PROSTATE SURGERY         Patient Active Problem List   Diagnosis   • Cataract of both eyes   • Diabetic nephropathy associated with type 2 diabetes mellitus (HCC)   • Family history of ischemic heart disease   • Hyperlipidemia   • Hypertension, benign   • Left atrial enlargement   • Obstructive sleep apnea   • Overweight   • Seasonal allergic rhinitis due to pollen   • Situational stress   • Type 2 diabetes mellitus with both eyes affected by mild nonproliferative retinopathy without macular edema, without long-term current use of insulin (HCC)   • Vitamin B12 deficiency   • Type 2 diabetes mellitus without complication, with long-term current use of insulin (HCC)   • Stroke with cerebral ischemia (HCC)   • Malignant neoplasm of prostate (HCC)   • Neoplasm, uncertain whether benign or malignant   • Hypoglycemia   • Encounter for general adult medical examination with abnormal findings   • Lethargy   • Right leg pain   • Colon cancer screening   • Elevated liver function tests       Current Outpatient Medications on File Prior to Visit   Medication Sig Dispense Refill   • Accu-Chek FastClix Lancets misc Check blood sugar 3 x daily 102 each 5   • fluticasone (Flonase Allergy Relief) 50 MCG/ACT nasal spray 2 sprays by Each Nare route Daily. Shake well before using. 16 g 3   • glucose blood (Accu-Chek Guide) test strip 1 each by Other route 3 (Three) Times a Day. 100 each 12   • Insulin Syringe-Needle U-100 (ReliOn Insulin Syringe) 31G X 15/64\" 1 ML misc Inject 1 syringe under the skin into the appropriate area as directed 2 (Two) Times a Day. 90 each 0   • " "loratadine (Claritin) 10 MG tablet Take 1 tablet by mouth Daily. 90 tablet 0     Current Facility-Administered Medications on File Prior to Visit   Medication Dose Route Frequency Provider Last Rate Last Admin   • cyanocobalamin injection 1,000 mcg  1,000 mcg Intramuscular Q28 Days Hayes Sharp MD   1,000 mcg at 07/22/20 1727   • cyanocobalamin injection 1,000 mcg  1,000 mcg Intramuscular Q28 Days Hayes Sharp MD   1,000 mcg at 11/02/20 1725       Allergies   Allergen Reactions   • Sulfa Antibiotics Dizziness       Review of Systems   Constitutional: Negative for fatigue, unexpected weight gain and unexpected weight loss.   Eyes: Negative for visual disturbance.   Respiratory: Negative for shortness of breath.    Cardiovascular: Negative for chest pain, palpitations and leg swelling.   Gastrointestinal: Negative for nausea.   Endocrine: Negative for polyphagia and polyuria.   Genitourinary: Negative for frequency.   Musculoskeletal: Negative for myalgias.   Skin: Negative for dry skin and skin lesions.   Neurological: Negative for syncope, numbness and headache.       Objective   Visit Vitals  /100 (BP Location: Left arm, Patient Position: Sitting, Cuff Size: Large Adult)   Pulse 91   Temp 97.1 °F (36.2 °C) (Temporal)   Resp 18   Ht 177.8 cm (70\")   Wt 124 kg (273 lb)   SpO2 96%   BMI 39.17 kg/m²     Physical Exam  Vitals and nursing note reviewed.   Constitutional:       Appearance: He is well-developed.   HENT:      Head: Normocephalic.   Neck:      Thyroid: No thyromegaly.      Vascular: No carotid bruit.      Trachea: Trachea normal.   Cardiovascular:      Rate and Rhythm: Normal rate and regular rhythm.      Heart sounds: No murmur heard.    No friction rub. No gallop.   Pulmonary:      Effort: Pulmonary effort is normal. No respiratory distress.      Breath sounds: Normal breath sounds. No wheezing.   Chest:      Chest wall: No tenderness.   Musculoskeletal:      Cervical back: Neck supple. "   Skin:     General: Skin is dry.      Findings: No rash.      Nails: There is no clubbing.   Neurological:      Mental Status: He is alert and oriented to person, place, and time.   Psychiatric:         Behavior: Behavior is cooperative.           Assessment & Plan .  Problem List Items Addressed This Visit        High    Situational stress    Current Assessment & Plan     Worse, 2nd to aging parents.  Discussed starting medication, pt. Agrees and will start Celexa 10 mg daily.  Benefits and risk discussed I feel benefits outweigh the risk         Relevant Medications    citalopram (CeleXA) 10 MG tablet       Medium    Diabetic nephropathy associated with type 2 diabetes mellitus (HCC)    Current Assessment & Plan     Worse with hemoglobin A1c up to 6.9 from 6.7.  Multiple home blood sugars reviewed and scanned to the chart         Relevant Medications    metFORMIN (FORTAMET) 1000 MG (OSM) 24 hr tablet    Dulaglutide (Trulicity) 1.5 MG/0.5ML solution pen-injector    insulin NPH (NovoLIN N ReliOn) 100 UNIT/ML injection    insulin regular (NovoLIN R) 100 UNIT/ML injection    Hyperlipidemia - Primary    Overview     LDL goal less than 70         Current Assessment & Plan     Lipid and CMP done 3-4-2023, read by me, reviewed with pt.  Trig. 109 down from 118, Tot. Chol. 148 up from 132, HDL 35 down from 36, LDL 93 up from 75  Worsening.   Encouraged to watch fatty intake, exercise more, and lose weight.   Compliant with medication.  Patient tolerated Lovastatin well without side effects. I feel the benefits of the medication outweigh the risks.  Is not getting adequate diet and exercise  Goals developed at last visit were not met   Follow up in 3  months  Care management needs are self-addressed.  Self-management abilities addressed and patient is capable of managing his own disease.           Relevant Medications    lovastatin (MEVACOR) 40 MG tablet    Other Relevant Orders    Lipid Panel With / Chol / HDL Ratio     Comprehensive metabolic panel    Hypertension, benign    Current Assessment & Plan     Worse.  Advised to start checking B/P at home 2 x daily and bring in blood pressure diary.  Discussed starting HCTZ with patient, pt. Prefers to hold on medication and bring in the medication he has at home that caused problems in the past.   Patient tolerated Norvasc, Lisinopril and Metoprolol well without side effects. I feel the benefits of the medication outweigh the risks.  Encouraged to watch salt, exercise more and lose weight.  He feels blood pressures in poor control due to the stress from his parents illness  Follow in 2 weeks.           Relevant Medications    amLODIPine (NORVASC) 5 MG tablet    lisinopril (PRINIVIL,ZESTRIL) 20 MG tablet    metoprolol succinate XL (TOPROL-XL) 50 MG 24 hr tablet    Type 2 diabetes mellitus with both eyes affected by mild nonproliferative retinopathy without macular edema, without long-term current use of insulin (HCC)    Current Assessment & Plan     Hemoglobin A1c worse up to 6.9 from 6.7.  He is on Novulin R, Novulin N and metformin without side effects.  Benefits of drugs outweigh the risk         Relevant Medications    metFORMIN (FORTAMET) 1000 MG (OSM) 24 hr tablet    Dulaglutide (Trulicity) 1.5 MG/0.5ML solution pen-injector    insulin NPH (NovoLIN N ReliOn) 100 UNIT/ML injection    insulin regular (NovoLIN R) 100 UNIT/ML injection    Type 2 diabetes mellitus without complication, with long-term current use of insulin (HCC)    Current Assessment & Plan     A1c and Microalbumin/Creatinine ratio done 3-4-2023, read by me, reviewed with pt.  A1c was 6.9 up from 6.7. Microalbumin/Creatinine artio was 199 down from 397  Worsening.    Encouraged to watch sugar intake, exercise more and lose weight.   Compliant with medication.   Patient tolerated Trulicity, Novolin N, Novolin R and Metformin well without side effects. I feel the benefits of the medication outweigh the risks.  Monitoring  "sugar at home.   Follow up in 3  months  Care management needs are self-addressed.  Self-management abilities addressed and patient is capable of managing his own disease.           Relevant Medications    metFORMIN (FORTAMET) 1000 MG (OSM) 24 hr tablet    Dulaglutide (Trulicity) 1.5 MG/0.5ML solution pen-injector    insulin NPH (NovoLIN N ReliOn) 100 UNIT/ML injection    insulin regular (NovoLIN R) 100 UNIT/ML injection    ReliOn Insulin Syringe 31G X 15/64\" 1 ML misc    Other Relevant Orders    Hemoglobin A1c       Low    Left atrial enlargement    Current Assessment & Plan     Asymptomatic--keep blood pressures low         Relevant Medications    amLODIPine (NORVASC) 5 MG tablet    clopidogrel (PLAVIX) 75 MG tablet    metoprolol succinate XL (TOPROL-XL) 50 MG 24 hr tablet       Unprioritized    Elevated liver function tests    Current Assessment & Plan     New dx.  ALT was 45 up from 39--probably due to fatty liver but need to follow closely        Other Visit Diagnoses     Chronic renal failure, stage 3a (HCC)                   "

## 2023-03-15 NOTE — ASSESSMENT & PLAN NOTE
Worse, 2nd to aging parents.  Discussed starting medication, pt. Agrees and will start Celexa 10 mg daily.  Benefits and risk discussed I feel benefits outweigh the risk

## 2023-03-15 NOTE — ASSESSMENT & PLAN NOTE
Worse.  Advised to start checking B/P at home 2 x daily and bring in blood pressure diary.  Discussed starting HCTZ with patient, pt. Prefers to hold on medication and bring in the medication he has at home that caused problems in the past.   Patient tolerated Norvasc, Lisinopril and Metoprolol well without side effects. I feel the benefits of the medication outweigh the risks.  Encouraged to watch salt, exercise more and lose weight.  He feels blood pressures in poor control due to the stress from his parents illness  Follow in 2 weeks.

## 2023-03-15 NOTE — ASSESSMENT & PLAN NOTE
A1c and Microalbumin/Creatinine ratio done 3-4-2023, read by me, reviewed with pt.  A1c was 6.9 up from 6.7. Microalbumin/Creatinine artio was 199 down from 397  Worsening.    Encouraged to watch sugar intake, exercise more and lose weight.   Compliant with medication.   Patient tolerated Trulicity, Novolin N, Novolin R and Metformin well without side effects. I feel the benefits of the medication outweigh the risks.  Monitoring sugar at home.   Follow up in 3  months  Care management needs are self-addressed.  Self-management abilities addressed and patient is capable of managing his own disease.

## 2023-03-15 NOTE — ASSESSMENT & PLAN NOTE
Lipid and CMP done 3-4-2023, read by me, reviewed with pt.  Trig. 109 down from 118, Tot. Chol. 148 up from 132, HDL 35 down from 36, LDL 93 up from 75  Worsening.   Encouraged to watch fatty intake, exercise more, and lose weight.   Compliant with medication.  Patient tolerated Lovastatin well without side effects. I feel the benefits of the medication outweigh the risks.  Is not getting adequate diet and exercise  Goals developed at last visit were not met   Follow up in 3  months  Care management needs are self-addressed.  Self-management abilities addressed and patient is capable of managing his own disease.

## 2023-03-15 NOTE — ASSESSMENT & PLAN NOTE
Resolved.  CMP done 3-4-2023, read by me, reviewed with pt.  Creatinine was 1.09 down from 1.15, EGFR was 76 up from 71

## 2023-03-18 NOTE — ASSESSMENT & PLAN NOTE
Hemoglobin A1c worse up to 6.9 from 6.7.  He is on Novulin R, Novulin N and metformin without side effects.  Benefits of drugs outweigh the risk

## 2023-03-18 NOTE — ASSESSMENT & PLAN NOTE
Worse with hemoglobin A1c up to 6.9 from 6.7.  Multiple home blood sugars reviewed and scanned to the chart

## 2023-04-04 ENCOUNTER — OFFICE VISIT (OUTPATIENT)
Dept: FAMILY MEDICINE CLINIC | Facility: CLINIC | Age: 63
End: 2023-04-04
Payer: COMMERCIAL

## 2023-04-04 VITALS
WEIGHT: 273.8 LBS | HEIGHT: 70 IN | RESPIRATION RATE: 18 BRPM | TEMPERATURE: 97.7 F | SYSTOLIC BLOOD PRESSURE: 164 MMHG | OXYGEN SATURATION: 95 % | HEART RATE: 90 BPM | DIASTOLIC BLOOD PRESSURE: 90 MMHG | BODY MASS INDEX: 39.2 KG/M2

## 2023-04-04 DIAGNOSIS — I10 HYPERTENSION, BENIGN: Primary | ICD-10-CM

## 2023-04-04 DIAGNOSIS — E66.3 OVERWEIGHT: ICD-10-CM

## 2023-04-04 DIAGNOSIS — I63.9 STROKE WITH CEREBRAL ISCHEMIA: ICD-10-CM

## 2023-04-04 RX ORDER — LISINOPRIL 20 MG/1
20 TABLET ORAL 2 TIMES DAILY
Qty: 180 TABLET | Refills: 1
Start: 2023-04-04

## 2023-04-04 RX ORDER — METOPROLOL SUCCINATE 50 MG/1
50 TABLET, EXTENDED RELEASE ORAL DAILY
Qty: 90 TABLET | Refills: 3
Start: 2023-04-04

## 2023-04-04 RX ORDER — AMLODIPINE BESYLATE 10 MG/1
10 TABLET ORAL DAILY
Qty: 90 TABLET | Refills: 1 | Status: SHIPPED | OUTPATIENT
Start: 2023-04-04

## 2023-04-04 NOTE — PROGRESS NOTES
Subjective   Thor Crouch is a 62 y.o. male.     Hypertension  This is a chronic problem. The current episode started more than 1 year ago. The problem is unchanged. The problem is controlled. Pertinent negatives include no chest pain, palpitations or shortness of breath. There are no associated agents to hypertension. Risk factors for coronary artery disease include dyslipidemia, diabetes mellitus and obesity. The current treatment provides no improvement.        The following portions of the patient's history were reviewed and updated as appropriate: allergies, current medications, past family history, past medical history, past social history, past surgical history and problem list.    Family History   Problem Relation Age of Onset   • Arthritis Mother    • Heart disease Mother         ischemic   • Goiter Father    • Heart disease Father         ischemic   • Heart attack Father    • Diabetes Sister         diabetes mellitus   • Diabetes Brother         diabetes mellitus   • Diabetes Maternal Uncle         diabetes mellitus   • Diabetes Paternal Grandmother         diabetes mellitus   • Stroke Paternal Grandfather    • Kidney failure Other         Uncle   • Arthritis Other         grandmother       Social History     Tobacco Use   • Smoking status: Never   • Smokeless tobacco: Never   Substance Use Topics   • Alcohol use: No   • Drug use: No       Past Surgical History:   Procedure Laterality Date   • PROSTATE SURGERY         Patient Active Problem List   Diagnosis   • Cataract of both eyes   • Diabetic nephropathy associated with type 2 diabetes mellitus   • Family history of ischemic heart disease   • Hyperlipidemia   • Hypertension, benign   • Left atrial enlargement   • Obstructive sleep apnea   • Overweight   • Seasonal allergic rhinitis due to pollen   • Situational stress   • Type 2 diabetes mellitus with both eyes affected by mild nonproliferative retinopathy without macular edema, without long-term current  "use of insulin   • Vitamin B12 deficiency   • Type 2 diabetes mellitus without complication, with long-term current use of insulin   • Stroke with cerebral ischemia   • Malignant neoplasm of prostate   • Neoplasm, uncertain whether benign or malignant   • Hypoglycemia   • Encounter for general adult medical examination with abnormal findings   • Lethargy   • Right leg pain   • Colon cancer screening   • Elevated liver function tests       Current Outpatient Medications on File Prior to Visit   Medication Sig Dispense Refill   • Accu-Chek FastClix Lancets misc Check blood sugar 3 x daily 102 each 5   • citalopram (CeleXA) 10 MG tablet Take 1 tablet by mouth Daily. 30 tablet 5   • clopidogrel (PLAVIX) 75 MG tablet Take 1 tablet by mouth Daily. 90 tablet 1   • Dulaglutide (Trulicity) 1.5 MG/0.5ML solution pen-injector Inject 1.5 mg under the skin into the appropriate area as directed 1 (One) Time Per Week. 9 mL 5   • fluticasone (Flonase Allergy Relief) 50 MCG/ACT nasal spray 2 sprays by Each Nare route Daily. Shake well before using. 16 g 3   • glucose blood (Accu-Chek Guide) test strip 1 each by Other route 3 (Three) Times a Day. 100 each 12   • insulin NPH (NovoLIN N ReliOn) 100 UNIT/ML injection Inject 90 units under the skin at hs 30 mL 12   • insulin regular (NovoLIN R) 100 UNIT/ML injection Inject 30 Units under the skin into the appropriate area as directed Daily. Take within 30 minutes of beginning largest  meal. 1 each 12   • Insulin Syringe-Needle U-100 (ReliOn Insulin Syringe) 31G X 15/64\" 1 ML misc Inject 1 syringe under the skin into the appropriate area as directed 2 (Two) Times a Day. 90 each 0   • loratadine (Claritin) 10 MG tablet Take 1 tablet by mouth Daily. 90 tablet 0   • lovastatin (MEVACOR) 40 MG tablet Take 1 tablet by mouth Every Evening. 90 tablet 1   • metFORMIN (FORTAMET) 1000 MG (OSM) 24 hr tablet Take 1 tablet by mouth Daily With Breakfast. 180 tablet 1   • ReliOn Insulin Syringe 31G X " "15/64\" 1 ML misc Inject 80 Units under the skin into the appropriate area as directed 3 (Three) Times a Day. 100 each 12     Current Facility-Administered Medications on File Prior to Visit   Medication Dose Route Frequency Provider Last Rate Last Admin   • cyanocobalamin injection 1,000 mcg  1,000 mcg Intramuscular Q28 Days Hayes Sharp MD   1,000 mcg at 07/22/20 1727   • cyanocobalamin injection 1,000 mcg  1,000 mcg Intramuscular Q28 Days Hayes Sharp MD   1,000 mcg at 11/02/20 1725       Allergies   Allergen Reactions   • Sulfa Antibiotics Dizziness       Review of Systems   Constitutional: Negative for fatigue, unexpected weight gain and unexpected weight loss.   Respiratory: Negative for shortness of breath.    Cardiovascular: Negative for chest pain, palpitations and leg swelling.   Neurological: Negative for headache.       Objective   Visit Vitals  /90 (BP Location: Left arm, Patient Position: Sitting, Cuff Size: Large Adult)   Pulse 90   Temp 97.7 °F (36.5 °C) (Temporal)   Resp 18   Ht 177.8 cm (70\")   Wt 124 kg (273 lb 12.8 oz)   SpO2 95%   BMI 39.29 kg/m²     Physical Exam  Vitals and nursing note reviewed.   Constitutional:       Appearance: He is well-developed.   HENT:      Head: Normocephalic.   Neck:      Thyroid: No thyromegaly.      Vascular: No carotid bruit.      Trachea: Trachea normal.   Cardiovascular:      Rate and Rhythm: Normal rate and regular rhythm.      Heart sounds: No murmur heard.    No friction rub. No gallop.   Pulmonary:      Effort: Pulmonary effort is normal. No respiratory distress.      Breath sounds: Normal breath sounds. No wheezing.   Chest:      Chest wall: No tenderness.   Musculoskeletal:      Cervical back: Neck supple.   Skin:     General: Skin is dry.      Findings: No rash.      Nails: There is no clubbing.   Neurological:      Mental Status: He is alert and oriented to person, place, and time.   Psychiatric:         Behavior: Behavior is cooperative. "           Assessment & Plan .  Problem List Items Addressed This Visit        High    Stroke with cerebral ischemia    Current Assessment & Plan     He needs to keep his risk factors low thus will be more aggressive with daily blood pressure treatment and continue his Plavix.  Benefits of the drug outweigh the risk            Medium    Hypertension, benign - Primary    Current Assessment & Plan     Poor control.  Discussed adding HCTZ to 50 mg daily or increasing Norvasc to 10 mg from 5 mgdaily.  Pt. Prefers to increase Norvasc to 10 mg daily.  .  Patient tolerated Norvasc, Lisinopril and Metoprolol well without side effects. I feel the benefits of the medication outweigh the risks.  Encouraged to watch salt, exercise more and lose weight.           Relevant Medications    lisinopril (PRINIVIL,ZESTRIL) 20 MG tablet    metoprolol succinate XL (TOPROL-XL) 50 MG 24 hr tablet    amLODIPine (NORVASC) 10 MG tablet       Unprioritized    Overweight    Current Assessment & Plan     Patient's (Body mass index is 39.29 kg/m².) indicates that they are overweight with health conditions that include hypertension, diabetes mellitus and dyslipidemias . Weight is unchanged. BMI is is above average; BMI management plan is completed. We discussed portion control and increasing exercise.

## 2023-04-04 NOTE — ASSESSMENT & PLAN NOTE
Poor control.  Discussed adding HCTZ to 50 mg daily or increasing Norvasc to 10 mg from 5 mgdaily.  Pt. Prefers to increase Norvasc to 10 mg daily.  .  Patient tolerated Norvasc, Lisinopril and Metoprolol well without side effects. I feel the benefits of the medication outweigh the risks.  Encouraged to watch salt, exercise more and lose weight.

## 2023-04-04 NOTE — ASSESSMENT & PLAN NOTE
Patient's (Body mass index is 39.29 kg/m².) indicates that they are overweight with health conditions that include hypertension, diabetes mellitus and dyslipidemias . Weight is unchanged. BMI is is above average; BMI management plan is completed. We discussed portion control and increasing exercise.

## 2023-04-15 NOTE — ASSESSMENT & PLAN NOTE
He needs to keep his risk factors low thus will be more aggressive with daily blood pressure treatment and continue his Plavix.  Benefits of the drug outweigh the risk

## 2023-05-01 DIAGNOSIS — Z79.4 TYPE 2 DIABETES MELLITUS WITHOUT COMPLICATION, WITH LONG-TERM CURRENT USE OF INSULIN: ICD-10-CM

## 2023-05-01 DIAGNOSIS — E11.9 TYPE 2 DIABETES MELLITUS WITHOUT COMPLICATION, WITH LONG-TERM CURRENT USE OF INSULIN: ICD-10-CM

## 2023-05-03 RX ORDER — EMPAGLIFLOZIN 10 MG/1
TABLET, FILM COATED ORAL
Qty: 90 TABLET | Refills: 1 | Status: SHIPPED | OUTPATIENT
Start: 2023-05-03

## 2023-06-04 LAB
ALBUMIN SERPL-MCNC: 4.2 G/DL (ref 3.8–4.8)
ALBUMIN/GLOB SERPL: 1.7 {RATIO} (ref 1.2–2.2)
ALP SERPL-CCNC: 81 IU/L (ref 44–121)
ALT SERPL-CCNC: 33 IU/L (ref 0–44)
AST SERPL-CCNC: 20 IU/L (ref 0–40)
BILIRUB SERPL-MCNC: 0.3 MG/DL (ref 0–1.2)
BUN SERPL-MCNC: 22 MG/DL (ref 8–27)
BUN/CREAT SERPL: 20 (ref 10–24)
CALCIUM SERPL-MCNC: 8.9 MG/DL (ref 8.6–10.2)
CHLORIDE SERPL-SCNC: 106 MMOL/L (ref 96–106)
CHOLEST SERPL-MCNC: 126 MG/DL (ref 100–199)
CHOLEST/HDLC SERPL: 3.7 RATIO (ref 0–5)
CO2 SERPL-SCNC: 23 MMOL/L (ref 20–29)
CREAT SERPL-MCNC: 1.1 MG/DL (ref 0.76–1.27)
EGFRCR SERPLBLD CKD-EPI 2021: 75 ML/MIN/1.73
GLOBULIN SER CALC-MCNC: 2.5 G/DL (ref 1.5–4.5)
GLUCOSE SERPL-MCNC: 130 MG/DL (ref 70–99)
HBA1C MFR BLD: 6.6 % (ref 4.8–5.6)
HDLC SERPL-MCNC: 34 MG/DL
LDLC SERPL CALC-MCNC: 72 MG/DL (ref 0–99)
POTASSIUM SERPL-SCNC: 5.2 MMOL/L (ref 3.5–5.2)
PROT SERPL-MCNC: 6.7 G/DL (ref 6–8.5)
SODIUM SERPL-SCNC: 142 MMOL/L (ref 134–144)
TRIGL SERPL-MCNC: 109 MG/DL (ref 0–149)
VLDLC SERPL CALC-MCNC: 20 MG/DL (ref 5–40)

## 2023-06-14 ENCOUNTER — OFFICE VISIT (OUTPATIENT)
Dept: FAMILY MEDICINE CLINIC | Facility: CLINIC | Age: 63
End: 2023-06-14
Payer: COMMERCIAL

## 2023-06-14 VITALS
HEART RATE: 77 BPM | WEIGHT: 271.4 LBS | DIASTOLIC BLOOD PRESSURE: 71 MMHG | HEIGHT: 70 IN | TEMPERATURE: 97.7 F | RESPIRATION RATE: 14 BRPM | SYSTOLIC BLOOD PRESSURE: 132 MMHG | BODY MASS INDEX: 38.85 KG/M2 | OXYGEN SATURATION: 97 %

## 2023-06-14 DIAGNOSIS — I10 HYPERTENSION, BENIGN: ICD-10-CM

## 2023-06-14 DIAGNOSIS — F43.9 SITUATIONAL STRESS: ICD-10-CM

## 2023-06-14 DIAGNOSIS — Z79.4 TYPE 2 DIABETES MELLITUS WITHOUT COMPLICATION, WITH LONG-TERM CURRENT USE OF INSULIN: ICD-10-CM

## 2023-06-14 DIAGNOSIS — I51.7 LEFT ATRIAL ENLARGEMENT: ICD-10-CM

## 2023-06-14 DIAGNOSIS — E66.3 OVERWEIGHT: ICD-10-CM

## 2023-06-14 DIAGNOSIS — E78.2 MIXED HYPERLIPIDEMIA: Primary | ICD-10-CM

## 2023-06-14 DIAGNOSIS — E11.9 TYPE 2 DIABETES MELLITUS WITHOUT COMPLICATION, WITH LONG-TERM CURRENT USE OF INSULIN: ICD-10-CM

## 2023-06-14 DIAGNOSIS — E11.3293 TYPE 2 DIABETES MELLITUS WITH BOTH EYES AFFECTED BY MILD NONPROLIFERATIVE RETINOPATHY WITHOUT MACULAR EDEMA, WITHOUT LONG-TERM CURRENT USE OF INSULIN: ICD-10-CM

## 2023-06-14 RX ORDER — METFORMIN HYDROCHLORIDE EXTENDED-RELEASE TABLETS 1000 MG/1
1000 TABLET, FILM COATED, EXTENDED RELEASE ORAL
Qty: 180 TABLET | Refills: 1 | Status: SHIPPED | OUTPATIENT
Start: 2023-06-14 | End: 2023-06-15

## 2023-06-14 RX ORDER — CITALOPRAM 10 MG/1
10 TABLET ORAL DAILY
Qty: 90 TABLET | Refills: 1 | Status: SHIPPED | OUTPATIENT
Start: 2023-06-14

## 2023-06-14 RX ORDER — AMLODIPINE BESYLATE 10 MG/1
10 TABLET ORAL DAILY
Qty: 90 TABLET | Refills: 1 | Status: SHIPPED | OUTPATIENT
Start: 2023-06-14

## 2023-06-14 RX ORDER — LANCETS
EACH MISCELLANEOUS
Qty: 102 EACH | Refills: 5
Start: 2023-06-14

## 2023-06-14 RX ORDER — CLOPIDOGREL BISULFATE 75 MG/1
75 TABLET ORAL DAILY
Qty: 90 TABLET | Refills: 1 | Status: SHIPPED | OUTPATIENT
Start: 2023-06-14

## 2023-06-14 RX ORDER — LISINOPRIL 20 MG/1
20 TABLET ORAL 2 TIMES DAILY
Qty: 180 TABLET | Refills: 1
Start: 2023-06-14

## 2023-06-14 RX ORDER — LOVASTATIN 40 MG/1
40 TABLET ORAL EVERY EVENING
Qty: 90 TABLET | Refills: 1 | Status: SHIPPED | OUTPATIENT
Start: 2023-06-14

## 2023-06-14 RX ORDER — METFORMIN HYDROCHLORIDE EXTENDED-RELEASE TABLETS 1000 MG/1
1000 TABLET, FILM COATED, EXTENDED RELEASE ORAL
Qty: 180 TABLET | Refills: 1 | Status: SHIPPED | OUTPATIENT
Start: 2023-06-14 | End: 2023-06-14 | Stop reason: SDUPTHER

## 2023-06-14 NOTE — PROGRESS NOTES
Subjective   Thor Crouch is a 62 y.o. male.     Hyperlipidemia  This is a chronic problem. The current episode started more than 1 year ago. The problem is controlled. Pertinent negatives include no chest pain, myalgias or shortness of breath. Current antihyperlipidemic treatment includes statins.   Hypertension  This is a chronic problem. The current episode started more than 1 year ago. The problem has been rapidly improving since onset. The problem is controlled. Pertinent negatives include no chest pain, palpitations or shortness of breath.   Diabetes  He presents for his follow-up diabetic visit. He has type 2 diabetes mellitus. The initial diagnosis of diabetes was made 10 years ago. Pertinent negatives for diabetes include no chest pain, no fatigue, no polyphagia, no polyuria and no weight loss.      The following portions of the patient's history were reviewed and updated as appropriate: allergies, current medications, past family history, past medical history, past social history, past surgical history, and problem list.    Family History   Problem Relation Age of Onset    Arthritis Mother     Heart disease Mother         ischemic    Goiter Father     Heart disease Father         ischemic    Heart attack Father     Diabetes Sister         diabetes mellitus    Diabetes Brother         diabetes mellitus    Diabetes Maternal Uncle         diabetes mellitus    Diabetes Paternal Grandmother         diabetes mellitus    Stroke Paternal Grandfather     Kidney failure Other         Uncle    Arthritis Other         grandmother       Social History     Tobacco Use    Smoking status: Never    Smokeless tobacco: Never   Substance Use Topics    Alcohol use: No    Drug use: No       Past Surgical History:   Procedure Laterality Date    PROSTATE SURGERY         Patient Active Problem List   Diagnosis    Cataract of both eyes    Diabetic nephropathy associated with type 2 diabetes mellitus    Family history of ischemic  "heart disease    Hyperlipidemia    Hypertension, benign    Left atrial enlargement    Obstructive sleep apnea    Overweight    Seasonal allergic rhinitis due to pollen    Situational stress    Type 2 diabetes mellitus with both eyes affected by mild nonproliferative retinopathy without macular edema, without long-term current use of insulin    Vitamin B12 deficiency    Stroke with cerebral ischemia    Malignant neoplasm of prostate    Neoplasm, uncertain whether benign or malignant    Hypoglycemia    Encounter for general adult medical examination with abnormal findings    Lethargy    Right leg pain    Colon cancer screening    Elevated liver function tests       Current Outpatient Medications on File Prior to Visit   Medication Sig Dispense Refill    Dulaglutide (Trulicity) 1.5 MG/0.5ML solution pen-injector Inject 1.5 mg under the skin into the appropriate area as directed 1 (One) Time Per Week. 9 mL 5    fluticasone (Flonase Allergy Relief) 50 MCG/ACT nasal spray 2 sprays by Each Nare route Daily. Shake well before using. 16 g 3    glucose blood (Accu-Chek Guide) test strip 1 each by Other route 3 (Three) Times a Day. 100 each 12    insulin NPH (NovoLIN N ReliOn) 100 UNIT/ML injection Inject 90 units under the skin at hs 30 mL 12    insulin regular (NovoLIN R) 100 UNIT/ML injection Inject 30 Units under the skin into the appropriate area as directed Daily. Take within 30 minutes of beginning largest  meal. 1 each 12    Insulin Syringe-Needle U-100 (ReliOn Insulin Syringe) 31G X 15/64\" 1 ML misc Inject 1 syringe under the skin into the appropriate area as directed 2 (Two) Times a Day. 90 each 0    loratadine (Claritin) 10 MG tablet Take 1 tablet by mouth Daily. 90 tablet 0    metoprolol succinate XL (TOPROL-XL) 50 MG 24 hr tablet Take 1 tablet by mouth Daily. 90 tablet 3    ReliOn Insulin Syringe 31G X 15/64\" 1 ML misc Inject 80 Units under the skin into the appropriate area as directed 3 (Three) Times a Day. 100 " "each 12     Current Facility-Administered Medications on File Prior to Visit   Medication Dose Route Frequency Provider Last Rate Last Admin    cyanocobalamin injection 1,000 mcg  1,000 mcg Intramuscular Q28 Days Hayes Sharp MD   1,000 mcg at 07/22/20 1727    cyanocobalamin injection 1,000 mcg  1,000 mcg Intramuscular Q28 Days Hayes Sharp MD   1,000 mcg at 11/02/20 1725       Allergies   Allergen Reactions    Sulfa Antibiotics Dizziness       Review of Systems   Constitutional:  Negative for fatigue, unexpected weight gain and unexpected weight loss.   Eyes:  Negative for visual disturbance.   Respiratory:  Negative for shortness of breath.    Cardiovascular:  Negative for chest pain, palpitations and leg swelling.   Gastrointestinal:  Negative for nausea.   Endocrine: Negative for polyphagia and polyuria.   Genitourinary:  Negative for frequency.   Musculoskeletal:  Negative for myalgias.   Skin:  Negative for dry skin and skin lesions.   Neurological:  Negative for syncope, numbness and headache.     Objective   Visit Vitals  /71 (BP Location: Left arm, Patient Position: Sitting, Cuff Size: Large Adult)   Pulse 77   Temp 97.7 °F (36.5 °C)   Resp 14   Ht 177.8 cm (70\")   Wt 123 kg (271 lb 6.4 oz)   SpO2 97%   BMI 38.94 kg/m²     Physical Exam  Vitals and nursing note reviewed.   Constitutional:       Appearance: He is well-developed.   HENT:      Head: Normocephalic.   Neck:      Thyroid: No thyromegaly.      Vascular: No carotid bruit.      Trachea: Trachea normal.   Cardiovascular:      Rate and Rhythm: Normal rate and regular rhythm.      Heart sounds: No murmur heard.    No friction rub. No gallop.   Pulmonary:      Effort: Pulmonary effort is normal. No respiratory distress.      Breath sounds: Normal breath sounds. No wheezing.   Chest:      Chest wall: No tenderness.   Musculoskeletal:      Cervical back: Neck supple.   Skin:     General: Skin is dry.      Findings: No rash.      Nails: " There is no clubbing.   Neurological:      Mental Status: He is alert and oriented to person, place, and time.   Psychiatric:         Behavior: Behavior is cooperative.         Assessment & Plan .  Problem List Items Addressed This Visit          High    Situational stress    Current Assessment & Plan     Doing well with Celexa without side effects.  Benefits outweigh the risk         Relevant Medications    citalopram (CeleXA) 10 MG tablet       Medium    Hyperlipidemia - Primary    Overview     LDL goal less than 70         Current Assessment & Plan     Improved and close to goal  Encouraged to watch fatty intake, exercise more, and lose weight.   compliant with medication  Patient tolerated lovastatin well without side effects. I feel the benefits of the medication outweigh the risks.    Is not getting adequate diet and exercise  Goals developed at last visit were met   Follow up in  3 months  Care management needs are self-addressed. Self-management abilities addressed and patient is capable of managing his own disease.           Relevant Medications    lovastatin (MEVACOR) 40 MG tablet    Hypertension, benign    Current Assessment & Plan     Doing well; Encouraged to watch salt, exercise more and lose weight.  Patient tolerated norvasc, lisinopril, metoprolol well without side effects. I feel the benefits of the medication outweigh the risks.           Relevant Medications    amLODIPine (NORVASC) 10 MG tablet    lisinopril (PRINIVIL,ZESTRIL) 20 MG tablet    Type 2 diabetes mellitus with both eyes affected by mild nonproliferative retinopathy without macular edema, without long-term current use of insulin    Current Assessment & Plan     Improved,  Encouraged to watch sugar intake, exercise more and lose weight.   compliant with medication.  Patient tolerated trulicity, Novolin N, Metformin well without side effects. I feel the benefits of the medication outweigh the risks.    monitoring sugar at home.  These  are reviewed and scanned in the chart  Follow up in 3 months  Care management needs are self-addressed. management. Self-management abilities addressed and patient is capable of managing his own disease.              Low    Left atrial enlargement    Current Assessment & Plan     Stable         Relevant Medications    amLODIPine (NORVASC) 10 MG tablet    clopidogrel (PLAVIX) 75 MG tablet       Unprioritized    Overweight     Other Visit Diagnoses       Type 2 diabetes mellitus without complication, with long-term current use of insulin        Relevant Medications    Accu-Chek FastClix Lancets misc

## 2023-06-14 NOTE — ASSESSMENT & PLAN NOTE
Improved and close to goal  Encouraged to watch fatty intake, exercise more, and lose weight.   compliant with medication  Patient tolerated lovastatin well without side effects. I feel the benefits of the medication outweigh the risks.    Is not getting adequate diet and exercise  Goals developed at last visit were met   Follow up in  3 months  Care management needs are self-addressed. Self-management abilities addressed and patient is capable of managing his own disease.

## 2023-06-15 DIAGNOSIS — E11.3293 TYPE 2 DIABETES MELLITUS WITH BOTH EYES AFFECTED BY MILD NONPROLIFERATIVE RETINOPATHY WITHOUT MACULAR EDEMA, WITHOUT LONG-TERM CURRENT USE OF INSULIN: Primary | ICD-10-CM

## 2023-06-15 NOTE — ASSESSMENT & PLAN NOTE
Improved,  Encouraged to watch sugar intake, exercise more and lose weight.   compliant with medication.  Patient tolerated trulicity, Novolin N, Metformin well without side effects. I feel the benefits of the medication outweigh the risks.    monitoring sugar at home.  These are reviewed and scanned in the chart  Follow up in 3 months  Care management needs are self-addressed. management. Self-management abilities addressed and patient is capable of managing his own disease.

## 2023-07-31 DIAGNOSIS — E11.9 TYPE 2 DIABETES MELLITUS WITHOUT COMPLICATION, WITH LONG-TERM CURRENT USE OF INSULIN: ICD-10-CM

## 2023-07-31 DIAGNOSIS — Z79.4 TYPE 2 DIABETES MELLITUS WITHOUT COMPLICATION, WITH LONG-TERM CURRENT USE OF INSULIN: ICD-10-CM

## 2023-08-01 RX ORDER — LANCETS
EACH MISCELLANEOUS
Qty: 102 EACH | Refills: 5 | Status: SHIPPED | OUTPATIENT
Start: 2023-08-01

## 2023-09-08 NOTE — ASSESSMENT & PLAN NOTE
Patient states that he has had probably 5 episodes of hypoglycemia, which usually occurs early in the morning. Discussed with jean and decided not to change any medications at this time.    REG: 9/24/2023 [id number]  GDM - diet controlled        Date   Fast   Brkfst   Lunch   Post   Supper   Post   9/1 92 95  80  118   2 96 105  125  113   3 90 103  113  138   4 94 98  101  97   5 96 107  119  112   6 91 110  97  118   7  8 92  91 98  116  115

## 2023-09-25 ENCOUNTER — TELEPHONE (OUTPATIENT)
Dept: FAMILY MEDICINE CLINIC | Facility: CLINIC | Age: 63
End: 2023-09-25

## 2023-09-25 NOTE — TELEPHONE ENCOUNTER
Caller: Thor Crouch    Relationship: Self    Best call back number:     What is the best time to reach you:     Who are you requesting to speak with (clinical staff, provider,  specific staff member): JJ    Do you know the name of the person who called:     What was the call regarding: PATIENT WANTS TO BE CALLED BY JJ TO DISCUSS HIS INSURANCE CHANGE AS OF DECEMBER.     Is it okay if the provider responds through MyChart:

## 2023-10-22 DIAGNOSIS — E78.2 MIXED HYPERLIPIDEMIA: ICD-10-CM

## 2023-10-23 RX ORDER — LOVASTATIN 40 MG/1
40 TABLET ORAL EVERY EVENING
Qty: 90 TABLET | Refills: 0 | Status: SHIPPED | OUTPATIENT
Start: 2023-10-23

## 2023-11-29 ENCOUNTER — OFFICE VISIT (OUTPATIENT)
Dept: FAMILY MEDICINE CLINIC | Facility: CLINIC | Age: 63
End: 2023-11-29
Payer: COMMERCIAL

## 2023-11-29 VITALS
TEMPERATURE: 98.4 F | HEIGHT: 70 IN | DIASTOLIC BLOOD PRESSURE: 82 MMHG | WEIGHT: 276.6 LBS | RESPIRATION RATE: 18 BRPM | OXYGEN SATURATION: 95 % | SYSTOLIC BLOOD PRESSURE: 126 MMHG | HEART RATE: 87 BPM | BODY MASS INDEX: 39.6 KG/M2

## 2023-11-29 DIAGNOSIS — M54.41 ACUTE RIGHT-SIDED LOW BACK PAIN WITH RIGHT-SIDED SCIATICA: Primary | ICD-10-CM

## 2023-11-29 DIAGNOSIS — E11.3293 TYPE 2 DIABETES MELLITUS WITH BOTH EYES AFFECTED BY MILD NONPROLIFERATIVE RETINOPATHY WITHOUT MACULAR EDEMA, WITHOUT LONG-TERM CURRENT USE OF INSULIN: ICD-10-CM

## 2023-11-29 DIAGNOSIS — M54.2 NECK PAIN: ICD-10-CM

## 2023-11-29 DIAGNOSIS — E66.01 CLASS 2 SEVERE OBESITY DUE TO EXCESS CALORIES WITH SERIOUS COMORBIDITY AND BODY MASS INDEX (BMI) OF 36.0 TO 36.9 IN ADULT: ICD-10-CM

## 2023-11-29 LAB
EXPIRATION DATE: ABNORMAL
HBA1C MFR BLD: 7 % (ref 4.5–5.7)
Lab: ABNORMAL

## 2023-11-29 RX ORDER — PREDNISONE 5 MG/1
TABLET ORAL
Qty: 45 TABLET | Refills: 0 | Status: SHIPPED | OUTPATIENT
Start: 2023-11-29

## 2023-11-29 RX ORDER — CYCLOBENZAPRINE HCL 10 MG
5-10 TABLET ORAL NIGHTLY PRN
Qty: 30 TABLET | Refills: 2 | Status: SHIPPED | OUTPATIENT
Start: 2023-11-29

## 2023-11-29 RX ORDER — GABAPENTIN 100 MG/1
100 CAPSULE ORAL 3 TIMES DAILY PRN
Qty: 30 CAPSULE | Refills: 1 | Status: SHIPPED | OUTPATIENT
Start: 2023-11-29

## 2023-11-29 NOTE — PROGRESS NOTES
Subjective   Thor Crouch is a 63 y.o. male.     Chief Complaint   Patient presents with    Back Pain       Back Pain  This is a new problem. The current episode started in the past 7 days. The problem occurs constantly. The problem has been gradually worsening since onset. The pain is present in the gluteal. The quality of the pain is described as aching, stabbing and shooting. The pain radiates to the right thigh, right foot and right knee (into right sholder). The pain is at a severity of 8/10. The pain is severe. The symptoms are aggravated by sitting, standing and position. Associated symptoms include leg pain. Pertinent negatives include no abdominal pain, chest pain, numbness, tingling or weakness. Risk factors include obesity. He has tried heat and ice (tylnoel) for the symptoms. The treatment provided mild relief.            I personally reviewed and updated the patient's allergies, medications, problem list, and past medical, surgical, social, and family history. I have reviewed and confirmed the accuracy of the History of Present Illness and Review of Symptoms as documented by the MA/AGUSTINAN/RN. Osiel Piper MD    Family History   Problem Relation Age of Onset    Arthritis Mother     Heart disease Mother         ischemic    Goiter Father     Heart disease Father         ischemic    Heart attack Father     Diabetes Sister         diabetes mellitus    Diabetes Brother         diabetes mellitus    Diabetes Maternal Uncle         diabetes mellitus    Diabetes Paternal Grandmother         diabetes mellitus    Stroke Paternal Grandfather     Kidney failure Other         Uncle    Arthritis Other         grandmother       Social History     Tobacco Use    Smoking status: Never    Smokeless tobacco: Never   Vaping Use    Vaping Use: Never used   Substance Use Topics    Alcohol use: No    Drug use: No       Past Surgical History:   Procedure Laterality Date    PROSTATE SURGERY         Patient Active Problem List    Diagnosis    Cataract of both eyes    Diabetic nephropathy associated with type 2 diabetes mellitus    Family history of ischemic heart disease    Hyperlipidemia    Hypertension, benign    Left atrial enlargement    Obstructive sleep apnea    Overweight    Seasonal allergic rhinitis due to pollen    Situational stress    Type 2 diabetes mellitus with both eyes affected by mild nonproliferative retinopathy without macular edema, without long-term current use of insulin    Vitamin B12 deficiency    Stroke with cerebral ischemia    Malignant neoplasm of prostate    Neoplasm, uncertain whether benign or malignant    Hypoglycemia    Encounter for general adult medical examination with abnormal findings    Lethargy    Right leg pain    Colon cancer screening    Elevated liver function tests         Current Outpatient Medications:     Accu-Chek FastClix Lancets misc, CHECK BLOOD SUGAR THREE TIMES DAILY, Disp: 102 each, Rfl: 5    amLODIPine (NORVASC) 10 MG tablet, Take 1 tablet by mouth Daily., Disp: 90 tablet, Rfl: 1    citalopram (CeleXA) 10 MG tablet, Take 1 tablet by mouth Daily., Disp: 90 tablet, Rfl: 1    clopidogrel (PLAVIX) 75 MG tablet, Take 1 tablet by mouth Daily., Disp: 90 tablet, Rfl: 1    Dulaglutide (Trulicity) 1.5 MG/0.5ML solution pen-injector, Inject 1.5 mg under the skin into the appropriate area as directed 1 (One) Time Per Week., Disp: 9 mL, Rfl: 5    fluticasone (Flonase Allergy Relief) 50 MCG/ACT nasal spray, 2 sprays by Each Nare route Daily. Shake well before using., Disp: 16 g, Rfl: 3    glucose blood (Accu-Chek Guide) test strip, 1 each by Other route 3 (Three) Times a Day., Disp: 100 each, Rfl: 12    insulin NPH (NovoLIN N ReliOn) 100 UNIT/ML injection, Inject 90 units under the skin at hs, Disp: 30 mL, Rfl: 12    insulin regular (NovoLIN R) 100 UNIT/ML injection, Inject 30 Units under the skin into the appropriate area as directed Daily. Take within 30 minutes of beginning largest  meal.,  "Disp: 1 each, Rfl: 12    Insulin Syringe-Needle U-100 (ReliOn Insulin Syringe) 31G X 15/64\" 1 ML misc, Inject 1 syringe under the skin into the appropriate area as directed 2 (Two) Times a Day., Disp: 90 each, Rfl: 0    lisinopril (PRINIVIL,ZESTRIL) 20 MG tablet, Take 1 tablet by mouth 2 (Two) Times a Day., Disp: 180 tablet, Rfl: 1    loratadine (Claritin) 10 MG tablet, Take 1 tablet by mouth Daily., Disp: 90 tablet, Rfl: 0    lovastatin (MEVACOR) 40 MG tablet, TAKE 1 TABLET BY MOUTH EVERY EVENING, Disp: 90 tablet, Rfl: 0    metoprolol succinate XL (TOPROL-XL) 50 MG 24 hr tablet, Take 1 tablet by mouth Daily., Disp: 90 tablet, Rfl: 3    ReliOn Insulin Syringe 31G X 15/64\" 1 ML misc, Inject 80 Units under the skin into the appropriate area as directed 3 (Three) Times a Day., Disp: 100 each, Rfl: 12    cyclobenzaprine (FLEXERIL) 10 MG tablet, Take 0.5-1 tablets by mouth At Night As Needed for Muscle Spasms., Disp: 30 tablet, Rfl: 2    gabapentin (NEURONTIN) 100 MG capsule, Take 1 capsule by mouth 3 (Three) Times a Day As Needed (moderate pain)., Disp: 30 capsule, Rfl: 1    metFORMIN (GLUCOPHAGE) 1000 MG tablet, Take 1 tablet by mouth Daily With Breakfast. (Patient not taking: Reported on 11/29/2023), Disp: 90 tablet, Rfl: 1    Current Facility-Administered Medications:     cyanocobalamin injection 1,000 mcg, 1,000 mcg, Intramuscular, Q28 Days, Hayes Sharp MD, 1,000 mcg at 07/22/20 1727    cyanocobalamin injection 1,000 mcg, 1,000 mcg, Intramuscular, Q28 Days, Hayes Sharp MD, 1,000 mcg at 11/02/20 1725         Review of Systems   Constitutional:  Negative for chills and diaphoresis.   Eyes:  Negative for visual disturbance.   Respiratory:  Negative for shortness of breath.    Cardiovascular:  Negative for chest pain and palpitations.   Gastrointestinal:  Negative for abdominal pain and nausea.   Endocrine: Negative for polydipsia and polyphagia.   Musculoskeletal:  Positive for back pain. Negative for neck " "stiffness.   Skin:  Negative for color change and pallor.   Neurological:  Negative for tingling, seizures, syncope, weakness and numbness.   Hematological:  Negative for adenopathy.   Psychiatric/Behavioral:  Negative for hallucinations and suicidal ideas.        I have reviewed and confirmed the accuracy of the ROS as documented by the MA/LPN/RN Osiel Piper MD      Objective   /82 (BP Location: Right arm, Patient Position: Sitting, Cuff Size: Adult)   Pulse 87   Temp 98.4 °F (36.9 °C) (Temporal)   Resp 18   Ht 177.8 cm (70\")   Wt 125 kg (276 lb 9.6 oz)   SpO2 95%   BMI 39.69 kg/m²   BP Readings from Last 3 Encounters:   11/29/23 126/82   06/14/23 132/71   04/04/23 164/90     Wt Readings from Last 3 Encounters:   11/29/23 125 kg (276 lb 9.6 oz)   06/14/23 123 kg (271 lb 6.4 oz)   04/04/23 124 kg (273 lb 12.8 oz)     Physical Exam  Constitutional:       Appearance: He is well-developed. He is not diaphoretic.   HENT:      Head: Normocephalic.      Right Ear: Tympanic membrane, ear canal and external ear normal.      Left Ear: Tympanic membrane, ear canal and external ear normal.      Nose: Nose normal.   Eyes:      General: Lids are normal.      Extraocular Movements:      Right eye: No nystagmus.      Left eye: No nystagmus.      Conjunctiva/sclera: Conjunctivae normal.      Right eye: Right conjunctiva is not injected. No hemorrhage.     Left eye: Left conjunctiva is not injected. No hemorrhage.     Pupils: Pupils are equal, round, and reactive to light.      Funduscopic exam:     Right eye: No hemorrhage, exudate, AV nicking, arteriolar narrowing or papilledema.         Left eye: No hemorrhage, exudate, AV nicking, arteriolar narrowing or papilledema.   Neck:      Thyroid: No thyromegaly.      Vascular: No carotid bruit or JVD.      Trachea: No tracheal deviation.   Cardiovascular:      Rate and Rhythm: Normal rate and regular rhythm.      Heart sounds: Normal heart sounds. No murmur heard.     No " friction rub. No gallop.   Pulmonary:      Effort: Pulmonary effort is normal.      Breath sounds: Normal breath sounds. No stridor. No decreased breath sounds, wheezing or rales.   Abdominal:      General: Bowel sounds are normal. There is no distension.      Palpations: Abdomen is soft. There is no mass.      Tenderness: There is no abdominal tenderness. There is no guarding or rebound.      Hernia: No hernia is present.   Musculoskeletal:      Cervical back: No swelling, deformity, spasms or tenderness.      Thoracic back: Normal. No swelling, edema, deformity, lacerations, spasms or tenderness. Normal range of motion.      Lumbar back: No swelling, edema, deformity, spasms or tenderness. Normal range of motion.      Right hip: No deformity, tenderness or crepitus. Normal range of motion. Normal strength.      Left hip: Normal. No deformity, tenderness or crepitus. Normal range of motion. Normal strength.      Comments: Spurling negative   Lymphadenopathy:      Head:      Right side of head: No submental, submandibular, tonsillar, preauricular, posterior auricular or occipital adenopathy.      Left side of head: No submental, submandibular, tonsillar, preauricular, posterior auricular or occipital adenopathy.      Cervical: No cervical adenopathy.   Skin:     General: Skin is warm and dry.      Coloration: Skin is not pale.   Neurological:      Mental Status: He is alert and oriented to person, place, and time.      Cranial Nerves: No cranial nerve deficit.      Sensory: No sensory deficit.      Motor: No atrophy or abnormal muscle tone.      Coordination: Coordination normal.      Gait: Gait normal.      Deep Tendon Reflexes: Reflexes are normal and symmetric.         Data / Lab Results:    Hemoglobin A1C   Date Value Ref Range Status   11/29/2023 7.0 (A) 4.5 - 5.7 % Final   06/03/2023 6.6 (H) 4.8 - 5.6 % Final     Comment:              Prediabetes: 5.7 - 6.4           Diabetes: >6.4           Glycemic control  "for adults with diabetes: <7.0     03/04/2023 6.9 (H) 4.8 - 5.6 % Final     Comment:              Prediabetes: 5.7 - 6.4           Diabetes: >6.4           Glycemic control for adults with diabetes: <7.0     12/07/2022 6.7 (H) 4.8 - 5.6 % Final     Comment:              Prediabetes: 5.7 - 6.4           Diabetes: >6.4           Glycemic control for adults with diabetes: <7.0     09/17/2018 7.1 4.6 - 7.1 % Final   08/28/2017 8.0 4.6 - 7.1 % Final        Lab Results   Component Value Date    LDL 72 06/03/2023    LDL 93 03/04/2023    LDL 75 12/07/2022     Lab Results   Component Value Date    CHOL 136 12/15/2018    CHOL 165 07/05/2018    CHOL 153 06/16/2018     Lab Results   Component Value Date    TRIG 109 06/03/2023    TRIG 109 03/04/2023    TRIG 118 12/07/2022     Lab Results   Component Value Date    HDL 34 (L) 06/03/2023    HDL 35 (L) 03/04/2023    HDL 36 (L) 12/07/2022     Lab Results   Component Value Date    PSA 0.3 09/06/2022     Lab Results   Component Value Date    WBC 8.7 09/06/2022    HGB 15.1 09/06/2022    HCT 47.9 09/06/2022    MCV 89 09/06/2022     09/06/2022     Lab Results   Component Value Date    TSH 1.910 09/06/2022      Lab Results   Component Value Date    GLUCOSE 130 (H) 06/03/2023    BUN 22 06/03/2023    CREATININE 1.10 06/03/2023    EGFRIFNONA 69 01/15/2022    EGFRIFAFRI 80 01/15/2022    BCR 20 06/03/2023    K 5.2 06/03/2023    CO2 23 06/03/2023    CALCIUM 8.9 06/03/2023    PROTENTOTREF 6.7 06/03/2023    ALBUMIN 4.2 06/03/2023    LABIL2 1.7 06/03/2023    AST 20 06/03/2023    ALT 33 06/03/2023     No results found for: \"MARKY\", \"RF\", \"SEDRATE\"   No results found for: \"CRP\"   Lab Results   Component Value Date    IRON 61 01/07/2017    FERRITIN 28 01/07/2017      Lab Results   Component Value Date    [id number] 244 09/06/2022          Assessment & Plan      Medications        Problem List         LOS    Right arm pain.  With significant right paracervical/trapezius muscle spasm.  Likely flare " cervical disc disease.  Check x-rays.  Ice, rehabilitation exercises discussed.  Start nightly muscle relaxant, as needed gabapentin.  A1c acceptable today at 7.0 start prednisone, monitor blood sugars, adjust fast acting insulin as needed for anticipated hyperglycemia.  Follow-up recheck.  Consider further imaging, PT referral if persistent symptoms.  Diabetes.  Overall good control, A1c 7, did not tolerate metformin, has follow up visit upcoming.       Diagnoses and all orders for this visit:    1. Acute right-sided low back pain with right-sided sciatica (Primary)  -     cyclobenzaprine (FLEXERIL) 10 MG tablet; Take 0.5-1 tablets by mouth At Night As Needed for Muscle Spasms.  Dispense: 30 tablet; Refill: 2  -     gabapentin (NEURONTIN) 100 MG capsule; Take 1 capsule by mouth 3 (Three) Times a Day As Needed (moderate pain).  Dispense: 30 capsule; Refill: 1    2. Class 2 severe obesity due to excess calories with serious comorbidity and body mass index (BMI) of 36.0 to 36.9 in adult    3. Type 2 diabetes mellitus with both eyes affected by mild nonproliferative retinopathy without macular edema, without long-term current use of insulin  -     POC Glycosylated Hemoglobin (Hb A1C)    4. Neck pain  -     XR Spine Cervical Complete 4 or 5 View; Future  -     cyclobenzaprine (FLEXERIL) 10 MG tablet; Take 0.5-1 tablets by mouth At Night As Needed for Muscle Spasms.  Dispense: 30 tablet; Refill: 2  -     gabapentin (NEURONTIN) 100 MG capsule; Take 1 capsule by mouth 3 (Three) Times a Day As Needed (moderate pain).  Dispense: 30 capsule; Refill: 1        Class 2 Severe Obesity (BMI >=35 and <=39.9). Obesity-related health conditions include the following: hypertension, diabetes mellitus, and dyslipidemias. Obesity is worsening. BMI is is above average; BMI management plan is completed. We discussed portion control and increasing exercise.        Expected course, medications, and adverse effects discussed.  Call or return  if worsening or persistent symptoms.  I wore protective equipment throughout this patient encounter including a mask, gloves, and eye protection.  Hand hygiene was performed before donning protective equipment and after removal when leaving the room. The complete contents of the Assessment and Plan and Data/Lab Results as documented above have been reviewed and addressed by myself with the patient today as part of an ongoing evaluation / treatment plan.  If some of the documentation has been copied from a previous note and is unchanged it indicates that this problem / plan has been assessed today but is stable from a previous visit and no changes have been recommended.

## 2024-01-10 DIAGNOSIS — I10 HYPERTENSION, BENIGN: ICD-10-CM

## 2024-01-10 RX ORDER — AMLODIPINE BESYLATE 10 MG/1
10 TABLET ORAL DAILY
Qty: 90 TABLET | Refills: 0 | Status: SHIPPED | OUTPATIENT
Start: 2024-01-10

## 2024-01-10 NOTE — TELEPHONE ENCOUNTER
Caller: Thor Crouch    Relationship: Self    Best call back number: [phone] (Mobile)   Requested Prescriptions:   Requested Prescriptions     Pending Prescriptions Disp Refills    amLODIPine (NORVASC) 10 MG tablet 90 tablet 1     Sig: Take 1 tablet by mouth Daily.        Pharmacy where request should be sent: [id number] - [place]  [address] AT [facility], [place] [address]  & [address] [facility] - [phone]  - [phone] FX     Last office visit with prescribing clinician: 6/14/2023   Last telemedicine visit with prescribing clinician: Visit date not found   Next office visit with prescribing clinician: 1/15/2024     Additional details provided by patient: 90 DAY SUPPLY    Does the patient have less than a 3 day supply:  [] Yes  [] No    Would you like a call back once the refill request has been completed: [] Yes [] No    If the office needs to give you a call back, can they leave a voicemail: [] Yes [] No    Brianna Roca Rep   01/10/24 08:59 EST

## 2024-01-15 ENCOUNTER — OFFICE VISIT (OUTPATIENT)
Dept: FAMILY MEDICINE CLINIC | Facility: CLINIC | Age: 64
End: 2024-01-15
Payer: MEDICARE

## 2024-01-15 VITALS
TEMPERATURE: 97.3 F | SYSTOLIC BLOOD PRESSURE: 110 MMHG | BODY MASS INDEX: 37.41 KG/M2 | HEIGHT: 71 IN | OXYGEN SATURATION: 98 % | RESPIRATION RATE: 18 BRPM | DIASTOLIC BLOOD PRESSURE: 80 MMHG | WEIGHT: 267.2 LBS | HEART RATE: 97 BPM

## 2024-01-15 DIAGNOSIS — E11.3293 TYPE 2 DIABETES MELLITUS WITH BOTH EYES AFFECTED BY MILD NONPROLIFERATIVE RETINOPATHY WITHOUT MACULAR EDEMA, WITHOUT LONG-TERM CURRENT USE OF INSULIN: ICD-10-CM

## 2024-01-15 DIAGNOSIS — E66.3 OVERWEIGHT: ICD-10-CM

## 2024-01-15 DIAGNOSIS — I51.7 LEFT ATRIAL ENLARGEMENT: ICD-10-CM

## 2024-01-15 DIAGNOSIS — E53.8 VITAMIN B12 DEFICIENCY: ICD-10-CM

## 2024-01-15 DIAGNOSIS — Z12.5 SCREENING PSA (PROSTATE SPECIFIC ANTIGEN): ICD-10-CM

## 2024-01-15 DIAGNOSIS — I10 HYPERTENSION, BENIGN: Primary | ICD-10-CM

## 2024-01-15 DIAGNOSIS — E78.2 MIXED HYPERLIPIDEMIA: ICD-10-CM

## 2024-01-15 DIAGNOSIS — U07.1 COVID-19 VIRUS INFECTION: ICD-10-CM

## 2024-01-15 DIAGNOSIS — Z71.85 IMMUNIZATION COUNSELING: ICD-10-CM

## 2024-01-15 DIAGNOSIS — R53.83 LETHARGY: ICD-10-CM

## 2024-01-15 RX ORDER — CLOPIDOGREL BISULFATE 75 MG/1
75 TABLET ORAL DAILY
Qty: 90 TABLET | Refills: 4 | Status: SHIPPED | OUTPATIENT
Start: 2024-01-15

## 2024-01-15 NOTE — PROGRESS NOTES
Subjective   Thor Crouch is a 63 y.o. male.     Cough  This is a new problem. The current episode started 1 to 4 weeks ago. The problem has been improving. Pertinent negatives include no chest pain, shortness of breath or weight loss.   Diabetes  He presents for his follow-up diabetic visit. He has type 2 diabetes mellitus. His disease course has been worsening. Associated symptoms include fatigue (moderate). Pertinent negatives for diabetes include no chest pain and no weight loss.   Heart Problem  This is a chronic problem. The current episode started more than 1 year ago. The problem occurs constantly. The problem has been unchanged. Associated symptoms include arthralgias (right arm), coughing and fatigue (moderate). Pertinent negatives include no chest pain or joint swelling.   Hypertension  This is a chronic problem. The current episode started more than 1 year ago. The problem is unchanged. Pertinent negatives include no chest pain, palpitations or shortness of breath.        The following portions of the patient's history were reviewed and updated as appropriate: allergies, current medications, past family history, past medical history, past social history, past surgical history, and problem list.    Family History   Problem Relation Age of Onset    Arthritis Mother     Heart disease Mother         ischemic    Goiter Father     Heart disease Father         ischemic    Heart attack Father     Diabetes Sister         diabetes mellitus    Diabetes Brother         diabetes mellitus    Diabetes Maternal Uncle         diabetes mellitus    Diabetes Paternal Grandmother         diabetes mellitus    Stroke Paternal Grandfather     Kidney failure Other         Uncle    Arthritis Other         grandmother       Social History     Tobacco Use    Smoking status: Never     Passive exposure: Never    Smokeless tobacco: Never   Vaping Use    Vaping Use: Never used   Substance Use Topics    Alcohol use: No    Drug use: No        Past Surgical History:   Procedure Laterality Date    PROSTATE SURGERY         Patient Active Problem List   Diagnosis    Cataract of both eyes    Diabetic nephropathy associated with type 2 diabetes mellitus    Family history of ischemic heart disease    Hyperlipidemia    Hypertension, benign    Left atrial enlargement    Obstructive sleep apnea    Overweight    Seasonal allergic rhinitis due to pollen    Situational stress    Type 2 diabetes mellitus with both eyes affected by mild nonproliferative retinopathy without macular edema, without long-term current use of insulin    Vitamin B12 deficiency    Stroke with cerebral ischemia    Malignant neoplasm of prostate    Neoplasm, uncertain whether benign or malignant    Hypoglycemia    Encounter for general adult medical examination with abnormal findings    Lethargy    Right leg pain    Colon cancer screening    Elevated liver function tests    COVID-19 virus infection    Screening PSA (prostate specific antigen)    Immunization counseling       Current Outpatient Medications on File Prior to Visit   Medication Sig Dispense Refill    Accu-Chek FastClix Lancets misc CHECK BLOOD SUGAR THREE TIMES DAILY 102 each 5    amLODIPine (NORVASC) 10 MG tablet Take 1 tablet by mouth Daily. 90 tablet 0    citalopram (CeleXA) 10 MG tablet Take 1 tablet by mouth Daily. 90 tablet 1    Dulaglutide (Trulicity) 1.5 MG/0.5ML solution pen-injector Inject 1.5 mg under the skin into the appropriate area as directed 1 (One) Time Per Week. 9 mL 5    fluticasone (Flonase Allergy Relief) 50 MCG/ACT nasal spray 2 sprays by Each Nare route Daily. Shake well before using. 16 g 3    glucose blood (Accu-Chek Guide) test strip 1 each by Other route 3 (Three) Times a Day. 100 each 12    insulin NPH (NovoLIN N ReliOn) 100 UNIT/ML injection Inject 90 units under the skin at hs 30 mL 12    insulin regular (NovoLIN R) 100 UNIT/ML injection Inject 30 Units under the skin into the appropriate area as  "directed Daily. Take within 30 minutes of beginning largest  meal. 1 each 12    Insulin Syringe-Needle U-100 (ReliOn Insulin Syringe) 31G X 15/64\" 1 ML misc Inject 1 syringe under the skin into the appropriate area as directed 2 (Two) Times a Day. 90 each 0    lisinopril (PRINIVIL,ZESTRIL) 20 MG tablet Take 1 tablet by mouth 2 (Two) Times a Day. 180 tablet 1    loratadine (Claritin) 10 MG tablet Take 1 tablet by mouth Daily. 90 tablet 0    lovastatin (MEVACOR) 40 MG tablet TAKE 1 TABLET BY MOUTH EVERY EVENING 90 tablet 0    metFORMIN (GLUCOPHAGE) 1000 MG tablet Take 1 tablet by mouth Daily With Breakfast. 90 tablet 1    metoprolol succinate XL (TOPROL-XL) 50 MG 24 hr tablet Take 1 tablet by mouth Daily. 90 tablet 3    ReliOn Insulin Syringe 31G X 15/64\" 1 ML misc Inject 80 Units under the skin into the appropriate area as directed 3 (Three) Times a Day. 100 each 12    cyclobenzaprine (FLEXERIL) 10 MG tablet Take 0.5-1 tablets by mouth At Night As Needed for Muscle Spasms. (Patient not taking: Reported on 1/15/2024) 30 tablet 2    gabapentin (NEURONTIN) 100 MG capsule Take 1 capsule by mouth 3 (Three) Times a Day As Needed (moderate pain). (Patient not taking: Reported on 1/15/2024) 30 capsule 1     Current Facility-Administered Medications on File Prior to Visit   Medication Dose Route Frequency Provider Last Rate Last Admin    cyanocobalamin injection 1,000 mcg  1,000 mcg Intramuscular Q28 Days Hayes Sharp MD   1,000 mcg at 07/22/20 1727    cyanocobalamin injection 1,000 mcg  1,000 mcg Intramuscular Q28 Days Hayes Sharp MD   1,000 mcg at 11/02/20 1725       Allergies   Allergen Reactions    Sulfa Antibiotics Dizziness       Review of Systems   Constitutional:  Positive for fatigue (moderate). Negative for unexpected weight gain and unexpected weight loss.   HENT:  Negative for hearing loss.    Respiratory:  Positive for cough. Negative for shortness of breath.    Cardiovascular:  Negative for chest " "pain, palpitations and leg swelling.   Genitourinary:  Negative for frequency.        Nocturia   Musculoskeletal:  Positive for arthralgias (right arm). Negative for joint swelling.   Neurological:  Negative for headache and memory problem.       Objective   Visit Vitals  /80 (BP Location: Left arm, Patient Position: Sitting, Cuff Size: Adult)   Pulse 97   Temp 97.3 °F (36.3 °C) (Temporal)   Resp 18   Ht 180.3 cm (71\")   Wt 121 kg (267 lb 3.2 oz)   SpO2 98%   BMI 37.27 kg/m²     Physical Exam  Vitals and nursing note reviewed.   Constitutional:       Appearance: He is well-developed.   HENT:      Head: Normocephalic.   Neck:      Thyroid: No thyromegaly.      Vascular: No carotid bruit.      Trachea: Trachea normal.   Cardiovascular:      Rate and Rhythm: Normal rate and regular rhythm.      Heart sounds: No murmur heard.     No friction rub. No gallop.   Pulmonary:      Effort: Pulmonary effort is normal. No respiratory distress.      Breath sounds: Normal breath sounds. No wheezing.   Chest:      Chest wall: No tenderness.   Musculoskeletal:      Cervical back: Neck supple.   Skin:     General: Skin is dry.      Findings: No rash.      Nails: There is no clubbing.   Neurological:      Mental Status: He is alert and oriented to person, place, and time.   Psychiatric:         Behavior: Behavior is cooperative.           Assessment & Plan .  Problem List Items Addressed This Visit          Medium    Hyperlipidemia    Overview     LDL goal less than 70         Current Assessment & Plan     Will order labs today and patient will return for results and shared decision making.           Relevant Orders    Lipid Panel With / Chol / HDL Ratio (Completed)    Comprehensive Metabolic Panel (Completed)    Hypertension, benign - Primary    Current Assessment & Plan     Hypertension is improving with treatment.  He is on metoprolol and Norvasc without side effects.  Benefits outweigh the risk  Continue current treatment " regimen.  Dietary sodium restriction.  Weight loss.  Regular aerobic exercise.  Continue current medications.  Blood pressure will be reassessed in 2 weeks.         Type 2 diabetes mellitus with both eyes affected by mild nonproliferative retinopathy without macular edema, without long-term current use of insulin    Current Assessment & Plan     Extra visit due to steroid use for back in 11-23 and Covid 1-2024 .  Home blood sugars reviewed are doing well despite the use of steroids.  Will order labs today and patient will return for results and shared decision making.           Relevant Orders    Microalbumin / Creatinine Urine Ratio - Urine, Clean Catch (Completed)       Low    Left atrial enlargement    Current Assessment & Plan     Discussed ECHO, pt. Declines at present.           Relevant Medications    clopidogrel (PLAVIX) 75 MG tablet    Vitamin B12 deficiency    Overview                Current Assessment & Plan     Will order labs today and patient will return for results and shared decision making.           Relevant Orders    Vitamin B12 (Completed)    Methylmalonic Acid, Serum (Completed)       Unprioritized    COVID-19 virus infection    Current Assessment & Plan     Improving scale 0-10 he is a 3         Relevant Medications    pseudoephedrine-codeine-guaifenesin (MYTUSSIN DAC) 30- MG/5ML solution    guaiFENesin-codeine (GUAIFENESIN AC) 100-10 MG/5ML liquid    Immunization counseling    Overview     COVID 3-10-21, 12-6-21 and   T-Dap 5-27-15  Flu .  Advised to check with insurance on shingles coverage and get Pneu 20 at next visit.         Lethargy    Current Assessment & Plan     Will order labs today and patient will return for results and shared decision making.           Relevant Orders    CBC & Differential (Completed)    TSH (Completed)    Overweight    Current Assessment & Plan     Patient's (Body mass index is 37.27 kg/m².) indicates that they are overweight with health  conditions that include hypertension, diabetes mellitus, and dyslipidemias . Weight is worsening. BMI is is above average; BMI management plan is completed. We discussed portion control and increasing exercise.          Screening PSA (prostate specific antigen)    Current Assessment & Plan     Will order labs today and patient will return for results and shared decision making.           Relevant Orders    PSA Screen (Completed)

## 2024-01-15 NOTE — ASSESSMENT & PLAN NOTE
Extra visit due to steroid use for back in 11-23 and Covid 1-2024 .  Home blood sugars reviewed are doing well despite the use of steroids.  Will order labs today and patient will return for results and shared decision making.

## 2024-01-16 ENCOUNTER — TELEPHONE (OUTPATIENT)
Dept: FAMILY MEDICINE CLINIC | Facility: CLINIC | Age: 64
End: 2024-01-16
Payer: MEDICARE

## 2024-01-16 LAB
ALBUMIN SERPL-MCNC: 4.1 G/DL (ref 3.9–4.9)
ALBUMIN/CREAT UR: 73 MG/G CREAT (ref 0–29)
ALBUMIN/GLOB SERPL: 1.3 {RATIO} (ref 1.2–2.2)
ALP SERPL-CCNC: 83 IU/L (ref 44–121)
ALT SERPL-CCNC: 18 IU/L (ref 0–44)
AST SERPL-CCNC: 14 IU/L (ref 0–40)
BASOPHILS # BLD AUTO: 0.1 X10E3/UL (ref 0–0.2)
BASOPHILS NFR BLD AUTO: 1 %
BILIRUB SERPL-MCNC: 0.4 MG/DL (ref 0–1.2)
BUN SERPL-MCNC: 28 MG/DL (ref 8–27)
BUN/CREAT SERPL: 23 (ref 10–24)
CALCIUM SERPL-MCNC: 9.9 MG/DL (ref 8.6–10.2)
CHLORIDE SERPL-SCNC: 101 MMOL/L (ref 96–106)
CHOLEST SERPL-MCNC: 156 MG/DL (ref 100–199)
CHOLEST/HDLC SERPL: 4.1 RATIO (ref 0–5)
CO2 SERPL-SCNC: 21 MMOL/L (ref 20–29)
CREAT SERPL-MCNC: 1.22 MG/DL (ref 0.76–1.27)
CREAT UR-MCNC: 202.5 MG/DL
EGFRCR SERPLBLD CKD-EPI 2021: 66 ML/MIN/1.73
EOSINOPHIL # BLD AUTO: 0.2 X10E3/UL (ref 0–0.4)
EOSINOPHIL NFR BLD AUTO: 2 %
ERYTHROCYTE [DISTWIDTH] IN BLOOD BY AUTOMATED COUNT: 13.5 % (ref 11.6–15.4)
GLOBULIN SER CALC-MCNC: 3.2 G/DL (ref 1.5–4.5)
GLUCOSE SERPL-MCNC: 115 MG/DL (ref 70–99)
HCT VFR BLD AUTO: 45.8 % (ref 37.5–51)
HDLC SERPL-MCNC: 38 MG/DL
HGB BLD-MCNC: 14.6 G/DL (ref 13–17.7)
IMM GRANULOCYTES # BLD AUTO: 0.1 X10E3/UL (ref 0–0.1)
IMM GRANULOCYTES NFR BLD AUTO: 1 %
LDLC SERPL CALC-MCNC: 81 MG/DL (ref 0–99)
LYMPHOCYTES # BLD AUTO: 1.4 X10E3/UL (ref 0.7–3.1)
LYMPHOCYTES NFR BLD AUTO: 13 %
MCH RBC QN AUTO: 28.2 PG (ref 26.6–33)
MCHC RBC AUTO-ENTMCNC: 31.9 G/DL (ref 31.5–35.7)
MCV RBC AUTO: 88 FL (ref 79–97)
MICROALBUMIN UR-MCNC: 148.5 UG/ML
MONOCYTES # BLD AUTO: 1.3 X10E3/UL (ref 0.1–0.9)
MONOCYTES NFR BLD AUTO: 12 %
NEUTROPHILS # BLD AUTO: 7.6 X10E3/UL (ref 1.4–7)
NEUTROPHILS NFR BLD AUTO: 71 %
PLATELET # BLD AUTO: 461 X10E3/UL (ref 150–450)
POTASSIUM SERPL-SCNC: 5.4 MMOL/L (ref 3.5–5.2)
PROT SERPL-MCNC: 7.3 G/DL (ref 6–8.5)
PSA SERPL-MCNC: 0.4 NG/ML (ref 0–4)
RBC # BLD AUTO: 5.18 X10E6/UL (ref 4.14–5.8)
SODIUM SERPL-SCNC: 138 MMOL/L (ref 134–144)
TRIGL SERPL-MCNC: 220 MG/DL (ref 0–149)
TSH SERPL DL<=0.005 MIU/L-ACNC: 1.03 UIU/ML (ref 0.45–4.5)
VIT B12 SERPL-MCNC: 349 PG/ML (ref 232–1245)
VLDLC SERPL CALC-MCNC: 37 MG/DL (ref 5–40)
WBC # BLD AUTO: 10.6 X10E3/UL (ref 3.4–10.8)

## 2024-01-16 RX ORDER — CODEINE PHOSPHATE/GUAIFENESIN 10-100MG/5
5 LIQUID (ML) ORAL 3 TIMES DAILY PRN
Qty: 118 ML | Refills: 0 | Status: SHIPPED | OUTPATIENT
Start: 2024-01-16

## 2024-01-16 NOTE — TELEPHONE ENCOUNTER
Caller: Thor Crouch    Relationship: Self    Best call back number: [phone]     Which medication are you concerned about: pseudoephedrine-codeine-guaifenesin (MYTUSSIN DAC) 30- MG/5ML solution     What are your concerns: PATIENT CALLING STATING THAT THE PHARMACY DIDN'T RECEIVE THIS PRESCRIPTION HE WOULD LIKE TO GET THIS RECENT

## 2024-01-16 NOTE — ASSESSMENT & PLAN NOTE
Patient's (Body mass index is 37.27 kg/m².) indicates that they are overweight with health conditions that include hypertension, diabetes mellitus, and dyslipidemias . Weight is worsening. BMI is is above average; BMI management plan is completed. We discussed portion control and increasing exercise.

## 2024-01-18 LAB — METHYLMALONATE SERPL-SCNC: 313 NMOL/L (ref 0–378)

## 2024-01-20 NOTE — ASSESSMENT & PLAN NOTE
Hypertension is improving with treatment.  He is on metoprolol and Norvasc without side effects.  Benefits outweigh the risk  Continue current treatment regimen.  Dietary sodium restriction.  Weight loss.  Regular aerobic exercise.  Continue current medications.  Blood pressure will be reassessed in 2 weeks.

## 2024-01-22 PROBLEM — Z00.00 WELCOME TO MEDICARE PREVENTIVE VISIT: Status: ACTIVE | Noted: 2024-01-22

## 2024-01-22 PROBLEM — Z13.31 DEPRESSION SCREEN: Status: ACTIVE | Noted: 2024-01-22

## 2024-01-22 PROBLEM — Z71.89 ADVANCE CARE PLANNING: Status: ACTIVE | Noted: 2024-01-22

## 2024-01-22 NOTE — PROGRESS NOTES
"Thor Crouch is a 63 year old male here for his annual physical with me. Floerntin is here for coordination of medical care, to discuss health maintenance, disease prevention as well as to followup on medical problems. Patient has been followed by me since ***. Patient's last CPE was ***. Activity level is {desc; exercise frequency:97780}. Exercises {NUMBERS 0-7:29833} per week. Appetite is {Good Fair Poor:19057}. Feels {DESC; WELL/FAIRLY WELL/POORLY:65353} with {Desc; few/many/moderate amount:45946} complaints. Energy level is {Good Fair Poor:27971}. Sleeps {DESC; WELL/FAIRLY WELL/POORLY:62704}. Patient's last colonoscopy was ***. He is advised to repeat in {#:16258}. Patient is doing routine self skin exam {monthly:47793}. Patient is doing routine self-breast exams {monthly:92585}. Patient is checking testicles       The ABCs of the Annual Wellness Visit  Welcome to Medicare Visit    Subjective   {Wrapup  Review (Popup)  Advance Care Planning  Labs  CC  Problem List  Visit Diagnosis  Medications  Result Review  Imaging  Health Maintenance  Quality  BestPractice  SmartSets  SnapShot  Encounters  Notes  Media  Procedures :23}  Thor Crouch is a 63 y.o. male who presents for a  Welcome to Medicare Visit.    The following portions of the patient's history were reviewed and   updated as appropriate: {history reviewed:20406::\"allergies\",\"current medications\",\"past family history\",\"past medical history\",\"past social history\",\"past surgical history\",\"problem list\"}.     Compared to one year ago, the patient feels his physical   health is {better worse same:97378}.    Compared to one year ago, the patient feels his mental   health is {better worse same:45105}.    Recent Hospitalizations:  {Hospital Admission Status in the last 365 days:91900}      Current Medical Providers:  Patient Care Team:  Hayes Sharp MD as PCP - General (Family Medicine)  Mando, Hayes Mc MD as Consulting Physician " "(Internal Medicine)    Outpatient Medications Prior to Visit   Medication Sig Dispense Refill    Accu-Chek FastClix Lancets misc CHECK BLOOD SUGAR THREE TIMES DAILY 102 each 5    amLODIPine (NORVASC) 10 MG tablet Take 1 tablet by mouth Daily. 90 tablet 0    citalopram (CeleXA) 10 MG tablet Take 1 tablet by mouth Daily. 90 tablet 1    clopidogrel (PLAVIX) 75 MG tablet Take 1 tablet by mouth Daily. 90 tablet 04    cyclobenzaprine (FLEXERIL) 10 MG tablet Take 0.5-1 tablets by mouth At Night As Needed for Muscle Spasms. (Patient not taking: Reported on 1/15/2024) 30 tablet 2    Dulaglutide (Trulicity) 1.5 MG/0.5ML solution pen-injector Inject 1.5 mg under the skin into the appropriate area as directed 1 (One) Time Per Week. 9 mL 5    fluticasone (Flonase Allergy Relief) 50 MCG/ACT nasal spray 2 sprays by Each Nare route Daily. Shake well before using. 16 g 3    gabapentin (NEURONTIN) 100 MG capsule Take 1 capsule by mouth 3 (Three) Times a Day As Needed (moderate pain). (Patient not taking: Reported on 1/15/2024) 30 capsule 1    glucose blood (Accu-Chek Guide) test strip 1 each by Other route 3 (Three) Times a Day. 100 each 12    guaiFENesin-codeine (GUAIFENESIN AC) 100-10 MG/5ML liquid Take 5 mL by mouth 3 (Three) Times a Day As Needed for Cough. 118 mL 0    insulin NPH (NovoLIN N ReliOn) 100 UNIT/ML injection Inject 90 units under the skin at hs 30 mL 12    insulin regular (NovoLIN R) 100 UNIT/ML injection Inject 30 Units under the skin into the appropriate area as directed Daily. Take within 30 minutes of beginning largest  meal. 1 each 12    Insulin Syringe-Needle U-100 (ReliOn Insulin Syringe) 31G X 15/64\" 1 ML misc Inject 1 syringe under the skin into the appropriate area as directed 2 (Two) Times a Day. 90 each 0    lisinopril (PRINIVIL,ZESTRIL) 20 MG tablet Take 1 tablet by mouth 2 (Two) Times a Day. 180 tablet 1    loratadine (Claritin) 10 MG tablet Take 1 tablet by mouth Daily. 90 tablet 0    lovastatin " "(MEVACOR) 40 MG tablet TAKE 1 TABLET BY MOUTH EVERY EVENING 90 tablet 0    metFORMIN (GLUCOPHAGE) 1000 MG tablet Take 1 tablet by mouth Daily With Breakfast. 90 tablet 1    metoprolol succinate XL (TOPROL-XL) 50 MG 24 hr tablet Take 1 tablet by mouth Daily. 90 tablet 3    pseudoephedrine-codeine-guaifenesin (MYTUSSIN DAC) 30- MG/5ML solution Take 10 mL by mouth 4 (Four) Times a Day As Needed for Allergies. 118 mL 0    ReliOn Insulin Syringe 31G X 15/64\" 1 ML misc Inject 80 Units under the skin into the appropriate area as directed 3 (Three) Times a Day. 100 each 12     Facility-Administered Medications Prior to Visit   Medication Dose Route Frequency Provider Last Rate Last Admin    cyanocobalamin injection 1,000 mcg  1,000 mcg Intramuscular Q28 Days Hayes Sharp MD   1,000 mcg at 07/22/20 1727    cyanocobalamin injection 1,000 mcg  1,000 mcg Intramuscular Q28 Days Hayes Sharp MD   1,000 mcg at 11/02/20 1725       No opioid medication identified on active medication list. I have reviewed chart for other potential  high risk medication/s and harmful drug interactions in the elderly.        Aspirin is not on active medication list.  {ASPIRIN NOT ON MEDICATION LIST INDICATED/NOT INDICATED:13634}.    Patient Active Problem List   Diagnosis    Cataract of both eyes    Diabetic nephropathy associated with type 2 diabetes mellitus    Family history of ischemic heart disease    Hyperlipidemia    Hypertension, benign    Left atrial enlargement    Obstructive sleep apnea    Overweight    Seasonal allergic rhinitis due to pollen    Situational stress    Type 2 diabetes mellitus with both eyes affected by mild nonproliferative retinopathy without macular edema, without long-term current use of insulin    Vitamin B12 deficiency    Stroke with cerebral ischemia    Malignant neoplasm of prostate    Neoplasm, uncertain whether benign or malignant    Hypoglycemia    Encounter for general adult medical examination " "with abnormal findings    Lethargy    Right leg pain    Colon cancer screening    Elevated liver function tests    COVID-19 virus infection    Screening PSA (prostate specific antigen)    Immunization counseling   Discussion with {patient/family:73174}regarding advanced directives. {[place]IVING WILL:60647} form discussed at length and reviewed with patient. I spent over 16 minutes  with patient reviewing information and documenting  in the chart.  Patient states he {DOES_DOES NOT:77200} want CPR. Reviewed medical interventions with patient and the differences between each: Comfort, Limited and Full. Patient opted for {ACP Choices:39228}. Discussed the use of antibiotics at the end of life. He chose to {Use:25463} antibiotics consistent with treatment goals. Discussed artificially administered nutrition, patient is aware that if he is alert and oriented they can change their mind at any time. However, they have elected to have {Artificial Feedin}. Patient has identified his {Designee:14844} *** as his healthcare representative. Advised to discuss with healthcare representative if they can comply with wishes. Patient encouraged to have a meeting to discuss his decision regarding advanced care directives and goals of care with extended family and significant  friends  In regard to the POST form:{GE [place] Choices:53286} and advised to give to family members, place in an easily accessible place and take with them if going to the hospital or Emergency room.    Advance Care Planning   Advance Care Planning     Advance Directive is not on file.  {ACP Discussion, Advance Directive not in EMR:90952}       Objective   There were no vitals filed for this visit.  Estimated body mass index is 37.27 kg/m² as calculated from the following:    Height as of 1/15/24: 180.3 cm (71\").    Weight as of 1/15/24: 121 kg (267 lb 3.2 oz).           Does the patient have evidence of cognitive impairment?   {Yes/No:58097}    Lab " Results   Component Value Date    CHLPL 156 01/15/2024    TRIG 220 (H) 01/15/2024    HDL 38 (L) 01/15/2024    LDL 81 01/15/2024    VLDL 37 01/15/2024    HGBA1C 7.0 (A) 11/29/2023       Procedures       HEALTH RISK ASSESSMENT    Smoking Status:  Social History     Tobacco Use   Smoking Status Never    Passive exposure: Never   Smokeless Tobacco Never     Alcohol Consumption:  Social History     Substance and Sexual Activity   Alcohol Use No       Fall Risk Screen:    FAUSTO Fall Risk Assessment has not been completed.    Depression Screen:       3/15/2023     4:57 PM   PHQ-2/PHQ-9 Depression Screening   Little Interest or Pleasure in Doing Things 0-->not at all   Feeling Down, Depressed or Hopeless 1-->several days   PHQ-9: Brief Depression Severity Measure Score 1       Health Habits and Functional and Cognitive Screening:       No data to display                Visual Acuity:    No results found.    Age-appropriate Screening Schedule:  Refer to the list below for future screening recommendations based on patient's age, sex and/or medical conditions. Orders for these recommended tests are listed in the plan section. The patient has been provided with a written plan.    Health Maintenance   Topic Date Due    COLORECTAL CANCER SCREENING  Never done    ZOSTER VACCINE (1 of 2) Never done    Pneumococcal Vaccine 65+ (2 of 2 - PCV) 12/12/2008    HEPATITIS C SCREENING  Never done    ANNUAL WELLNESS VISIT  Never done    DIABETIC FOOT EXAM  04/21/2021    DIABETIC EYE EXAM  07/25/2023    INFLUENZA VACCINE  08/01/2023    COVID-19 Vaccine (3 - 2023-24 season) 09/01/2023    HEMOGLOBIN A1C  05/29/2024    LIPID PANEL  01/15/2025    URINE MICROALBUMIN  01/15/2025    BMI FOLLOWUP  01/15/2025    TDAP/TD VACCINES (3 - Td or Tdap) 05/27/2027        CMS Preventative Services Quick Reference  Risk Factors Identified During Encounter    {Medicare Wellness Risk Factors:51644}  The above risks/problems have been discussed with the  patient.  Pertinent information has been shared with the patient in the After Visit Summary.    Follow Up:   Initial Medicare Visit in one year    An After Visit Summary and PPPS were made available to the patient.  {Wrapup  Review (Popup)  Advance Care Planning  Labs  CC  Problem List  Visit Diagnosis  Medications  Result Review  Imaging  [facility]  BestPractice  SmartSets  SnapShot  Encounters  Notes  Media  Procedures :23}    Additional E&M Note during same encounter follows:  Patient has multiple medical problems which are significant and separately identifiable that require additional work above and beyond the Medicare Wellness Visit.      Chief Complaint  No chief complaint on file.    Subjective    {Problem List  Visit Diagnosis   Encounters  Notes  Medications  Labs  Result Review Imaging  Media :23}    HPI  Thor Crouch is also being seen today for ***         Objective   Vital Signs:  There were no vitals taken for this visit.    Physical Exam     {The following data was reviewed by (Optional):61339}  {Ambulatory Labs (Optional):97706}  {Data reviewed (Optional):83445:::1}        Assessment and Plan {CC Problem List  Visit Diagnosis   ROS  Review (Popup)  [facility]  [facility]  Medications  SmartSets  SnapShot Encounters  Media :23}  Diagnoses and all orders for this visit:    1. Malignant neoplasm of prostate (Primary)    2. Situational stress    3. Stroke with cerebral ischemia    4. Diabetic nephropathy associated with type 2 diabetes mellitus    5. Mixed hyperlipidemia    6. Hypertension, benign    7. Type 2 diabetes mellitus with both eyes affected by mild nonproliferative retinopathy without macular edema, without long-term current use of insulin    8. Left atrial enlargement    9. Obstructive sleep apnea    10. Seasonal allergic rhinitis due to pollen    11. Vitamin B12 deficiency    12. Age-related incipient cataract of both  eyes    13. Colon cancer screening    14. COVID-19 virus infection    15. Elevated liver function tests    16. Encounter for general adult medical examination with abnormal findings    17. Family history of ischemic heart disease    18. Hypoglycemia    19. Immunization counseling    20. Lethargy    21. Neoplasm, uncertain whether benign or malignant    22. Overweight    23. Right leg pain    24. Screening PSA (prostate specific antigen)           {Time Spent (Optional):49819}  Follow Up {Instructions Charge Capture  Follow-up Communications :23}  No follow-ups on file.  Patient was given instructions and counseling regarding his condition or for health maintenance advice. Please see specific information pulled into the AVS if appropriate.

## 2024-01-22 NOTE — PROGRESS NOTES
"  The ABCs of the Annual Wellness Visit  [place] to Medicare Visit    Subjective     Thor Crouch is a 63 y.o. male who presents for a  Welcome to Medicare Visit.    The following portions of the patient's history were reviewed and   updated as appropriate: {history reviewed:20406::\"allergies\",\"current medications\",\"past family history\",\"past medical history\",\"past social history\",\"past surgical history\",\"problem list\"}.     Compared to one year ago, the patient feels his physical   health is {better worse same:26565}.    Compared to one year ago, the patient feels his mental   health is {better worse same:28070}.    Recent Hospitalizations:  {Hospital Admission Status in the last 365 days:49232}      Current Medical Providers:  Patient Care Team:  Hayes Sharp MD as PCP - General (Family Medicine)  Mando, Hayes Mc MD as Consulting Physician (Internal Medicine)    Outpatient Medications Prior to Visit   Medication Sig Dispense Refill    Accu-Chek FastClix Lancets misc CHECK BLOOD SUGAR THREE TIMES DAILY 102 each 5    amLODIPine (NORVASC) 10 MG tablet Take 1 tablet by mouth Daily. 90 tablet 0    citalopram (CeleXA) 10 MG tablet Take 1 tablet by mouth Daily. 90 tablet 1    clopidogrel (PLAVIX) 75 MG tablet Take 1 tablet by mouth Daily. 90 tablet 04    cyclobenzaprine (FLEXERIL) 10 MG tablet Take 0.5-1 tablets by mouth At Night As Needed for Muscle Spasms. (Patient not taking: Reported on 1/15/2024) 30 tablet 2    Dulaglutide (Trulicity) 1.5 MG/0.5ML solution pen-injector Inject 1.5 mg under the skin into the appropriate area as directed 1 (One) Time Per Week. 9 mL 5    fluticasone (Flonase Allergy Relief) 50 MCG/ACT nasal spray 2 sprays by Each Nare route Daily. Shake well before using. 16 g 3    gabapentin (NEURONTIN) 100 MG capsule Take 1 capsule by mouth 3 (Three) Times a Day As Needed (moderate pain). (Patient not taking: Reported on 1/15/2024) 30 capsule 1    glucose blood (Accu-Chek Guide) " "test strip 1 each by Other route 3 (Three) Times a Day. 100 each 12    guaiFENesin-codeine (GUAIFENESIN AC) 100-10 MG/5ML liquid Take 5 mL by mouth 3 (Three) Times a Day As Needed for Cough. 118 mL 0    insulin NPH (NovoLIN N ReliOn) 100 UNIT/ML injection Inject 90 units under the skin at hs 30 mL 12    insulin regular (NovoLIN R) 100 UNIT/ML injection Inject 30 Units under the skin into the appropriate area as directed Daily. Take within 30 minutes of beginning largest  meal. 1 each 12    Insulin Syringe-Needle U-100 (ReliOn Insulin Syringe) 31G X 15/64\" 1 ML misc Inject 1 syringe under the skin into the appropriate area as directed 2 (Two) Times a Day. 90 each 0    lisinopril (PRINIVIL,ZESTRIL) 20 MG tablet Take 1 tablet by mouth 2 (Two) Times a Day. 180 tablet 1    loratadine (Claritin) 10 MG tablet Take 1 tablet by mouth Daily. 90 tablet 0    lovastatin (MEVACOR) 40 MG tablet TAKE 1 TABLET BY MOUTH EVERY EVENING 90 tablet 0    metFORMIN (GLUCOPHAGE) 1000 MG tablet Take 1 tablet by mouth Daily With Breakfast. 90 tablet 1    metoprolol succinate XL (TOPROL-XL) 50 MG 24 hr tablet Take 1 tablet by mouth Daily. 90 tablet 3    pseudoephedrine-codeine-guaifenesin (MYTUSSIN DAC) 30- MG/5ML solution Take 10 mL by mouth 4 (Four) Times a Day As Needed for Allergies. 118 mL 0    ReliOn Insulin Syringe 31G X 15/64\" 1 ML misc Inject 80 Units under the skin into the appropriate area as directed 3 (Three) Times a Day. 100 each 12     Facility-Administered Medications Prior to Visit   Medication Dose Route Frequency Provider Last Rate Last Admin    cyanocobalamin injection 1,000 mcg  1,000 mcg Intramuscular Q28 Days Hayes Sharp MD   1,000 mcg at 07/22/20 1727    cyanocobalamin injection 1,000 mcg  1,000 mcg Intramuscular Q28 Days Hayes Sharp MD   1,000 mcg at 11/02/20 1725       No opioid medication identified on active medication list. I have reviewed chart for other potential  high risk medication/s and " harmful drug interactions in the elderly.        Aspirin is not on active medication list.  {ASPIRIN NOT ON MEDICATION LIST INDICATED/NOT INDICATED:55290}.    Patient Active Problem List   Diagnosis    Cataract of both eyes    Diabetic nephropathy associated with type 2 diabetes mellitus    Family history of ischemic heart disease    Hyperlipidemia    Hypertension, benign    Left atrial enlargement    Obstructive sleep apnea    Overweight    Seasonal allergic rhinitis due to pollen    Situational stress    Type 2 diabetes mellitus with both eyes affected by mild nonproliferative retinopathy without macular edema, without long-term current use of insulin    Vitamin B12 deficiency    Stroke with cerebral ischemia    Malignant neoplasm of prostate    Neoplasm, uncertain whether benign or malignant    Hypoglycemia    Encounter for general adult medical examination with abnormal findings    Lethargy    Right leg pain    Colon cancer screening    Elevated liver function tests    COVID-19 virus infection    Screening PSA (prostate specific antigen)    Immunization counseling    Welcome to Medicare preventive visit    Advance care planning    Depression screen       Above medical problems all reviewed and significant problems identified and reviewed in the E and M visit.     Past Medical History:   Diagnosis Date    Cataract of both eyes     Other cataract of both eyes; Impression: Stable    Diabetic nephropathy associated with type 2 diabetes mellitus     Impression: Protein normal 06/18, will continue to repeat.    Epiretinal membrane, left     Impression: Followed with Dr. Murcia    History of prostate cancer     Impression: Patient has a follow up appt with Dr. Taylor NP today in [place], discussed with patient about transferring to Dr. Li or Michelle, so he can see them in the [place] office.    Hypertension, benign     Impression: Good control; Encouraged to watch salt, exercise more and lose weight    Left  atrial enlargement     Impression: Stable, will follow conservatively    Malignant neoplasm of prostate     Impression: Doing well. Followed with Dr. Washington    Microcytic anemia     Impression: cbc with next labs    Overweight     >25    Seasonal allergic rhinitis due to pollen     Impression: Doing well    Situational stress     Impression: Stable    Skin lesion     Unspecified Skin Lesion; Impression: Will obtain notes from [facility].    Stroke with cerebral ischemia     Impression: Doing well at this time. Will obtain records from Dr. Seipel, regarding medication Plavix.    Type 2 diabetes mellitus with both eyes affected by mild nonproliferative retinopathy without macular edema, without long-term current use of insulin     Impression: Uncertain control; Advised patient to start monitoring blood sugar at home since taking OTC cough/cold medications.    Vitamin B12 deficiency     Impression: Patient was supposed to recieve a B12 injection, however patient left before it was done. Patient was called to come back to get this injection.      Past Surgical History:   Procedure Laterality Date    PROSTATE SURGERY        Family History   Problem Relation Age of Onset    Arthritis Mother     Heart disease Mother         ischemic    Goiter Father     Heart disease Father         ischemic    Heart attack Father     Diabetes Sister         diabetes mellitus    Diabetes Brother         diabetes mellitus    Diabetes Maternal Uncle         diabetes mellitus    Diabetes Paternal Grandmother         diabetes mellitus    Stroke Paternal Grandfather     Kidney failure Other         Uncle    Arthritis Other         grandmother      Social History     Socioeconomic History    Marital status:    Tobacco Use    Smoking status: Never     Passive exposure: Never    Smokeless tobacco: Never   Vaping Use    Vaping Use: Never used   Substance and Sexual Activity    Alcohol use: No    Drug use: No    Sexual activity:  "Defer      Allergies   Allergen Reactions    Sulfa Antibiotics Dizziness        Advance Care Planning   Advance Care Planning  Discussion with {patient/family:52123}regarding advanced directives. {[place] WILL:47298} form discussed at length and reviewed with patient. I spent over 16 minutes  with patient reviewing information and documenting  in the chart.  Patient states he {DOES_DOES NOT:38258} want CPR. Reviewed medical interventions with patient and the differences between each: Comfort, Limited and Full. Patient opted for {ACP Choices:50727}. Discussed the use of antibiotics at the end of life. He chose to {Use:07567} antibiotics consistent with treatment goals. Discussed artificially administered nutrition, patient is aware that if he is alert and oriented they can change their mind at any time. However, they have elected to have {Artificial Feedin}. Patient has identified his {Designee:03450} *** as his healthcare representative. Advised to discuss with healthcare representative if they can comply with wishes. Patient encouraged to have a meeting to discuss his decision regarding advanced care directives and goals of care with extended family and significant  friends  In regard to the POST form:{[place] Choices:75076} and advised to give to family members, place in an easily accessible place and take with them if going to the hospital or Emergency room.       Advance Directive is not on file.  {ACP Discussion, Advance Directive not in EMR:62501}       Objective   There were no vitals filed for this visit.  Estimated body mass index is 37.27 kg/m² as calculated from the following:    Height as of 1/15/24: 180.3 cm (71\").    Weight as of 1/15/24: 121 kg (267 lb 3.2 oz).           Does the patient have evidence of cognitive impairment?   {Yes/No:40039}    Lab Results   Component Value Date    CHLPL 156 01/15/2024    TRIG 220 (H) 01/15/2024    HDL 38 (L) 01/15/2024    LDL 81 01/15/2024    VLDL " 37 01/15/2024    HGBA1C 7.0 (A) 11/29/2023            HEALTH RISK ASSESSMENT    Smoking Status:  Social History     Tobacco Use   Smoking Status Never    Passive exposure: Never   Smokeless Tobacco Never     Alcohol Consumption:  Social History     Substance and Sexual Activity   Alcohol Use No       Fall Risk Screen:    FAUSTO Fall Risk Assessment has not been completed.    Depression Screen:       3/15/2023     4:57 PM   PHQ-2/PHQ-9 Depression Screening   Little Interest or Pleasure in Doing Things 0-->not at all   Feeling Down, Depressed or Hopeless 1-->several days   PHQ-9: Brief Depression Severity Measure Score 1       Health Habits and Functional and Cognitive Screening:       No data to display                Visual Acuity:    No results found.    Age-appropriate Screening Schedule:  Refer to the list below for future screening recommendations based on patient's age, sex and/or medical conditions. Orders for these recommended tests are listed in the plan section. The patient has been provided with a written plan.    Health Maintenance   Topic Date Due    COLORECTAL CANCER SCREENING  Never done    ZOSTER VACCINE (1 of 2) Never done    Pneumococcal Vaccine 65+ (2 of 2 - PCV) 12/12/2008    HEPATITIS C SCREENING  Never done    ANNUAL WELLNESS VISIT  Never done    DIABETIC FOOT EXAM  04/21/2021    DIABETIC EYE EXAM  07/25/2023    INFLUENZA VACCINE  08/01/2023    COVID-19 Vaccine (3 - 2023-24 season) 09/01/2023    HEMOGLOBIN A1C  05/29/2024    LIPID PANEL  01/15/2025    URINE MICROALBUMIN  01/15/2025    BMI FOLLOWUP  01/15/2025    TDAP/TD VACCINES (3 - Td or Tdap) 05/27/2027        CMS Preventative Services Quick Reference  Risk Factors Identified During Encounter    {Medicare Wellness Risk Factors:00348}  The above risks/problems have been discussed with the patient.  Pertinent information has been shared with the patient in the After Visit Summary.  Follow up plans and orders are seen below in the  Assessment/Plan Section.    Diagnoses and all orders for this visit:    1. Malignant neoplasm of prostate (Primary)    2. Situational stress    3. Stroke with cerebral ischemia    4. Diabetic nephropathy associated with type 2 diabetes mellitus    5. Mixed hyperlipidemia    6. Hypertension, benign    7. Type 2 diabetes mellitus with both eyes affected by mild nonproliferative retinopathy without macular edema, without long-term current use of insulin    8. Left atrial enlargement    9. Obstructive sleep apnea    10. Seasonal allergic rhinitis due to pollen    11. Vitamin B12 deficiency    12. Age-related incipient cataract of both eyes    13. Colon cancer screening    14. COVID-19 virus infection    15. Elevated liver function tests    16. Encounter for general adult medical examination with abnormal findings    17. Family history of ischemic heart disease    18. Hypoglycemia    19. Immunization counseling    20. Lethargy    21. Neoplasm, uncertain whether benign or malignant    22. Overweight    23. Right leg pain    24. Screening PSA (prostate specific antigen)    25. Welcome to Medicare preventive visit    26. Advance care planning    27. Depression screen        Follow Up:   Initial Medicare Visit in one year    An After Visit Summary and PPPS were made available to the patient.

## 2024-01-23 ENCOUNTER — OFFICE VISIT (OUTPATIENT)
Dept: FAMILY MEDICINE CLINIC | Facility: CLINIC | Age: 64
End: 2024-01-23
Payer: MEDICARE

## 2024-01-23 VITALS
HEART RATE: 96 BPM | WEIGHT: 269.6 LBS | HEIGHT: 70 IN | OXYGEN SATURATION: 99 % | BODY MASS INDEX: 38.6 KG/M2 | SYSTOLIC BLOOD PRESSURE: 134 MMHG | TEMPERATURE: 97.1 F | DIASTOLIC BLOOD PRESSURE: 80 MMHG | RESPIRATION RATE: 18 BRPM

## 2024-01-23 DIAGNOSIS — Z71.85 IMMUNIZATION COUNSELING: ICD-10-CM

## 2024-01-23 DIAGNOSIS — Z79.4 TYPE 2 DIABETES MELLITUS WITHOUT COMPLICATION, WITH LONG-TERM CURRENT USE OF INSULIN: ICD-10-CM

## 2024-01-23 DIAGNOSIS — E78.2 MIXED HYPERLIPIDEMIA: Primary | ICD-10-CM

## 2024-01-23 DIAGNOSIS — C61 MALIGNANT NEOPLASM OF PROSTATE: ICD-10-CM

## 2024-01-23 DIAGNOSIS — Z71.89 ADVANCE CARE PLANNING: Primary | ICD-10-CM

## 2024-01-23 DIAGNOSIS — E16.2 HYPOGLYCEMIA: ICD-10-CM

## 2024-01-23 DIAGNOSIS — E66.3 OVERWEIGHT: ICD-10-CM

## 2024-01-23 DIAGNOSIS — E11.9 TYPE 2 DIABETES MELLITUS WITHOUT COMPLICATION, WITH LONG-TERM CURRENT USE OF INSULIN: ICD-10-CM

## 2024-01-23 DIAGNOSIS — D75.839 THROMBOCYTOSIS: ICD-10-CM

## 2024-01-23 DIAGNOSIS — I63.9 STROKE WITH CEREBRAL ISCHEMIA: ICD-10-CM

## 2024-01-23 DIAGNOSIS — E87.5 HYPERKALEMIA: ICD-10-CM

## 2024-01-23 DIAGNOSIS — Z00.01 ENCOUNTER FOR GENERAL ADULT MEDICAL EXAMINATION WITH ABNORMAL FINDINGS: ICD-10-CM

## 2024-01-23 DIAGNOSIS — Z12.11 COLON CANCER SCREENING: ICD-10-CM

## 2024-01-23 DIAGNOSIS — E11.3293 TYPE 2 DIABETES MELLITUS WITH BOTH EYES AFFECTED BY MILD NONPROLIFERATIVE RETINOPATHY WITHOUT MACULAR EDEMA, WITHOUT LONG-TERM CURRENT USE OF INSULIN: ICD-10-CM

## 2024-01-23 DIAGNOSIS — I10 HYPERTENSION, BENIGN: ICD-10-CM

## 2024-01-23 DIAGNOSIS — I44.0 1ST DEGREE AV BLOCK: ICD-10-CM

## 2024-01-23 DIAGNOSIS — M50.90 CERVICAL DISC DISEASE: ICD-10-CM

## 2024-01-23 DIAGNOSIS — E11.21 DIABETIC NEPHROPATHY ASSOCIATED WITH TYPE 2 DIABETES MELLITUS: ICD-10-CM

## 2024-01-23 DIAGNOSIS — E53.8 VITAMIN B12 DEFICIENCY: ICD-10-CM

## 2024-01-23 DIAGNOSIS — R53.83 LETHARGY: ICD-10-CM

## 2024-01-23 DIAGNOSIS — F43.9 SITUATIONAL STRESS: ICD-10-CM

## 2024-01-23 DIAGNOSIS — J30.1 SEASONAL ALLERGIC RHINITIS DUE TO POLLEN: ICD-10-CM

## 2024-01-23 DIAGNOSIS — M79.604 RIGHT LEG PAIN: ICD-10-CM

## 2024-01-23 DIAGNOSIS — Z13.31 DEPRESSION SCREEN: ICD-10-CM

## 2024-01-23 DIAGNOSIS — R79.89 ELEVATED LIVER FUNCTION TESTS: ICD-10-CM

## 2024-01-23 DIAGNOSIS — Z12.5 SCREENING PSA (PROSTATE SPECIFIC ANTIGEN): ICD-10-CM

## 2024-01-23 DIAGNOSIS — Z82.49 FAMILY HISTORY OF ISCHEMIC HEART DISEASE: ICD-10-CM

## 2024-01-23 DIAGNOSIS — D48.9 NEOPLASM, UNCERTAIN WHETHER BENIGN OR MALIGNANT: ICD-10-CM

## 2024-01-23 DIAGNOSIS — D69.6 THROMBOCYTOPENIA: ICD-10-CM

## 2024-01-23 DIAGNOSIS — I51.7 LEFT ATRIAL ENLARGEMENT: ICD-10-CM

## 2024-01-23 DIAGNOSIS — Z00.00 WELCOME TO MEDICARE PREVENTIVE VISIT: ICD-10-CM

## 2024-01-23 DIAGNOSIS — U07.1 COVID-19 VIRUS INFECTION: ICD-10-CM

## 2024-01-23 DIAGNOSIS — H25.093 AGE-RELATED INCIPIENT CATARACT OF BOTH EYES: ICD-10-CM

## 2024-01-23 DIAGNOSIS — G47.33 OBSTRUCTIVE SLEEP APNEA: ICD-10-CM

## 2024-01-23 RX ORDER — AMLODIPINE BESYLATE 10 MG/1
10 TABLET ORAL DAILY
Qty: 90 TABLET | Refills: 3 | Status: SHIPPED | OUTPATIENT
Start: 2024-01-23

## 2024-01-23 RX ORDER — HUMAN INSULIN 100 [IU]/ML
30 INJECTION, SOLUTION SUBCUTANEOUS DAILY
Qty: 2 EACH | Refills: 12 | Status: SHIPPED | OUTPATIENT
Start: 2024-01-23

## 2024-01-23 RX ORDER — LISINOPRIL 20 MG/1
20 TABLET ORAL 2 TIMES DAILY
Qty: 180 TABLET | Refills: 3 | Status: SHIPPED | OUTPATIENT
Start: 2024-01-23

## 2024-01-23 RX ORDER — METFORMIN HYDROCHLORIDE 500 MG/1
500 TABLET, EXTENDED RELEASE ORAL
Qty: 90 TABLET | Refills: 3 | Status: SHIPPED | OUTPATIENT
Start: 2024-01-23

## 2024-01-23 RX ORDER — HUMAN INSULIN 100 [IU]/ML
INJECTION, SUSPENSION SUBCUTANEOUS
Qty: 4 EACH | Refills: 12 | Status: SHIPPED | OUTPATIENT
Start: 2024-01-23

## 2024-01-23 RX ORDER — LOVASTATIN 40 MG/1
40 TABLET ORAL EVERY EVENING
Qty: 90 TABLET | Refills: 0 | Status: SHIPPED | OUTPATIENT
Start: 2024-01-23

## 2024-01-23 RX ORDER — METOPROLOL SUCCINATE 50 MG/1
50 TABLET, EXTENDED RELEASE ORAL DAILY
Qty: 90 TABLET | Refills: 3 | Status: SHIPPED | OUTPATIENT
Start: 2024-01-23

## 2024-01-23 RX ORDER — BACLOFEN 10 MG/1
10 TABLET ORAL 3 TIMES DAILY
Qty: 90 TABLET | Refills: 1 | Status: SHIPPED | OUTPATIENT
Start: 2024-01-23

## 2024-01-23 NOTE — ASSESSMENT & PLAN NOTE
Doing well.  Patient tolerated Norvasc, Lisinopril and Metoprolol well without side effects. I feel the benefits of the medication outweigh the risks.   Encouraged to watch salt, exercise more and lose weight.

## 2024-01-23 NOTE — ASSESSMENT & PLAN NOTE
A1c done 11-, read by me, reviewed with pt.  A1c was 7.0 up from 6.6  Worsening.  Start Metformin XL.  Encouraged to watch sugar intake, exercise more and lose weight. -compliant with medication.  Patient taking both Novolin N and R at the same time, advised to take separate.  Patient tolerated Trulicity, Novolin N and R well without side effects. I feel the benefits of the medication outweigh the risks.   Monitoring sugar at home.  These are reviewed and scanned to the chart  Follow up in 3 months  Care management needs are self-addressed. Self-management abilities addressed and patient is capable of managing his own disease.

## 2024-01-23 NOTE — ASSESSMENT & PLAN NOTE
New dx  EKG done today, read by me, reviewed with pt.  EKG showed NSR with vent rate of 77, 1st degree AV block, new from 7-5-2018.  Will repeat with next PE.

## 2024-01-23 NOTE — ASSESSMENT & PLAN NOTE
Lipid and CMP done 1-, read by me, reviewed with pt.  Trig. 220 up from 109, Tot. Chol. 156 up from 126, HDL 38 up from 34, LDL 81 up from 71  Worsening.   Encouraged to watch fatty intake, exercise more, and lose weight.   Compliant with medication.  Patient tolerated Lovastatin well without side effects. I feel the benefits of the medication outweigh the risks.   Is not getting adequate diet and exercise  Goals developed at last visit were not met   Follow up in 3 months  Care management needs are self-addressed Self-management abilities addressed and patient is capable of managing his own disease.

## 2024-01-23 NOTE — ASSESSMENT & PLAN NOTE
Doing well.  Vit. B12 done 1-, read by me, reviewed with pt.  Vit. B 12 was 349 up from 244.  Methylmalonic acid was normal

## 2024-01-23 NOTE — ASSESSMENT & PLAN NOTE
Improved.  Microalbumin/Creatinine ratio done 1-, read by me, reviewed with pt.  Microalbumin/creationine ratio was 73 down from 199

## 2024-01-23 NOTE — ASSESSMENT & PLAN NOTE
Patient's (There is no height or weight on file to calculate BMI.) indicates that they are overweight with health conditions that include hypertension, diabetes mellitus, and dyslipidemias . Weight is worsening. BMI is is above average; BMI management plan is completed. We discussed portion control and increasing exercise.

## 2024-01-23 NOTE — ASSESSMENT & PLAN NOTE
New dx.  Discussed Xray, pt. Declines at present.  Start Baclofen.  Patient tolerated Gabapentin well without side effects. I feel the benefits of the medication outweigh the risks.

## 2024-01-23 NOTE — ASSESSMENT & PLAN NOTE
New dx.  CBC done 1-, read by me, reviewed with pt.  Platelets was 461 up from 358.  Will repeat with next labs.

## 2024-01-23 NOTE — ASSESSMENT & PLAN NOTE
Doing well.  Patient tolerated Clairtin and Nasacort well without side effects. I feel the benefits of the medication outweigh the risks.

## 2024-01-23 NOTE — ASSESSMENT & PLAN NOTE
Resolved x 2.  CMP done 1-, read by me, reviewed with pt.  AST was normal x 3, ALT was normal x 2.  Will repeat with next labs.

## 2024-01-23 NOTE — ASSESSMENT & PLAN NOTE
Advised to lower risk factors.  Discussed starting ASA, pt. Declines at present.  Advised to discuss use of Plavix with Dr. Seiple.

## 2024-01-23 NOTE — PROGRESS NOTES
Subjective   Thor Crouch is a 63 y.o. male here for his annual physical with me. Thor is here for coordination of medical care, to discuss health maintenance, disease prevention as well as to followup on medical problems. Patient has been followed by me since 2003. Patient's last CPE was 9-. Activity level is minimal. Exercises 0 per week. Appetite is fair. Feels well with many complaints. Energy level is good. Sleeps well. Patient's last colonoscopy was Never. He is advised to haVE COLONOSCOPY, pt. Declines colonoscopy this year. Patient is doing routine self skin exam never.  Patient is doing routine self-breast exams monthly. Patient is checking testicles monthly.    Lab Results   Component Value Date    PSA 0.4 01/15/2024        History of Present Illness  Follow up Allergies.      Follow up Sleep apnea.  Hyperlipidemia  This is a chronic problem. The current episode started more than 1 year ago. The problem is controlled. Recent lipid tests were reviewed and are high. Factors aggravating his hyperlipidemia include fatty foods. Pertinent negatives include no chest pain, myalgias or shortness of breath. Current antihyperlipidemic treatment includes statins.   Heart Problem  This is a chronic problem. The current episode started more than 1 year ago. The problem occurs constantly. Associated symptoms include neck pain. Pertinent negatives include no abdominal pain, chest pain, chills, congestion, coughing, fatigue, fever, joint swelling, myalgias, nausea, rash, sore throat, vomiting or weakness.   Neck Pain   This is a new problem. The current episode started more than 1 month ago. The problem occurs intermittently. The problem has been unchanged. The pain is mild. Pertinent negatives include no chest pain, fever, weakness or weight loss.        The following portions of the patient's history were reviewed and updated as appropriate: allergies, current medications, past family history, past medical history,  past social history, past surgical history, and problem list.    Past Medical History:   Diagnosis Date    Cataract of both eyes     Other cataract of both eyes; Impression: Stable    Diabetic nephropathy associated with type 2 diabetes mellitus     Impression: Protein normal 06/18, will continue to repeat.    Epiretinal membrane, left     Impression: Followed with Dr. Murcia    History of prostate cancer     Impression: Patient has a follow up appt with Dr. Taylor NP today in [place], discussed with patient about transferring to Dr. Li or Michelle, so he can see them in the [place] office.    Hypertension, benign     Impression: Good control; Encouraged to watch salt, exercise more and lose weight    Left atrial enlargement     Impression: Stable, will follow conservatively    Malignant neoplasm of prostate     Impression: Doing well. Followed with Dr. Washington    Microcytic anemia     Impression: cbc with next labs    Overweight     >25    Seasonal allergic rhinitis due to pollen     Impression: Doing well    Situational stress     Impression: Stable    Skin lesion     Unspecified Skin Lesion; Impression: Will obtain notes from [facility].    Stroke with cerebral ischemia     Impression: Doing well at this time. Will obtain records from Dr. Seipel, regarding medication Plavix.    Type 2 diabetes mellitus with both eyes affected by mild nonproliferative retinopathy without macular edema, without long-term current use of insulin     Impression: Uncertain control; Advised patient to start monitoring blood sugar at home since taking OTC cough/cold medications.    Vitamin B12 deficiency     Impression: Patient was supposed to recieve a B12 injection, however patient left before it was done. Patient was called to come back to get this injection.       Family History   Problem Relation Age of Onset    Heart disease Mother         ischemic    Arthritis Mother     Goiter Father     Heart disease Father          ischemic    Heart attack Father     Diabetes Sister         diabetes mellitus    Diabetes Brother         diabetes mellitus    Diabetes Maternal Uncle         diabetes mellitus    Diabetes Paternal Grandmother         diabetes mellitus    Stroke Paternal Grandfather     Kidney failure Other         Uncle    Arthritis Other         grandmother       Social History     Socioeconomic History    Marital status:    Tobacco Use    Smoking status: Never     Passive exposure: Never    Smokeless tobacco: Never   Vaping Use    Vaping Use: Never used   Substance and Sexual Activity    Alcohol use: No    Drug use: No    Sexual activity: Defer       Social History     Social History Narrative     1 x in 1979.  No children at home.  Retired 1-1-24 from  [employer] after 47 years of service.   Caffeine Mountain Dew weekly.  Always wears seatbelts,  No exercise.  Hobbies none.        Past Surgical History:   Procedure Laterality Date    PROSTATE SURGERY         Current Outpatient Medications on File Prior to Visit   Medication Sig Dispense Refill    Accu-Chek FastClix Lancets misc CHECK BLOOD SUGAR THREE TIMES DAILY 102 each 5    citalopram (CeleXA) 10 MG tablet Take 1 tablet by mouth Daily. 90 tablet 1    clopidogrel (PLAVIX) 75 MG tablet Take 1 tablet by mouth Daily. 90 tablet 04    Dulaglutide (Trulicity) 1.5 MG/0.5ML solution pen-injector Inject 1.5 mg under the skin into the appropriate area as directed 1 (One) Time Per Week. 9 mL 5    fluticasone (Flonase Allergy Relief) 50 MCG/ACT nasal spray 2 sprays by Each Nare route Daily. Shake well before using. 16 g 3    gabapentin (NEURONTIN) 100 MG capsule Take 1 capsule by mouth 3 (Three) Times a Day As Needed (moderate pain). 30 capsule 1    glucose blood (Accu-Chek Guide) test strip 1 each by Other route 3 (Three) Times a Day. 100 each 12    guaiFENesin-codeine (GUAIFENESIN AC) 100-10 MG/5ML liquid Take 5 mL by mouth 3 (Three) Times a Day As Needed for  "Cough. 118 mL 0    Insulin Syringe-Needle U-100 (ReliOn Insulin Syringe) 31G X 15/64\" 1 ML misc Inject 1 syringe under the skin into the appropriate area as directed 2 (Two) Times a Day. 90 each 0    loratadine (Claritin) 10 MG tablet Take 1 tablet by mouth Daily. 90 tablet 0    pseudoephedrine-codeine-guaifenesin (MYTUSSIN DAC) 30- MG/5ML solution Take 10 mL by mouth 4 (Four) Times a Day As Needed for Allergies. 118 mL 0    ReliOn Insulin Syringe 31G X 15/64\" 1 ML misc Inject 80 Units under the skin into the appropriate area as directed 3 (Three) Times a Day. 100 each 12     Current Facility-Administered Medications on File Prior to Visit   Medication Dose Route Frequency Provider Last Rate Last Admin    cyanocobalamin injection 1,000 mcg  1,000 mcg Intramuscular Q28 Days Hayes Sharp MD   1,000 mcg at 07/22/20 1727    cyanocobalamin injection 1,000 mcg  1,000 mcg Intramuscular Q28 Days Hayes Sharp MD   1,000 mcg at 11/02/20 1725       Allergies   Allergen Reactions    Sulfa Antibiotics Dizziness       Review of Systems   Constitutional:  Negative for appetite change, chills, fatigue, fever, unexpected weight gain and unexpected weight loss.   HENT:  Negative for congestion, dental problem, ear discharge, ear pain, hearing loss, nosebleeds, postnasal drip, rhinorrhea, sinus pressure, sneezing, sore throat, tinnitus and voice change.         [facility]   Eyes:  Negative for blurred vision, double vision, pain, redness and visual disturbance.        Last vision exam 9-2023, Dr. Manzo-Doing well   Respiratory:  Negative for cough, shortness of breath, wheezing and stridor.    Cardiovascular:  Negative for chest pain, palpitations and leg swelling.   Gastrointestinal:  Negative for abdominal pain, anal bleeding, blood in stool, constipation, diarrhea, nausea, rectal pain, vomiting, GERD and indigestion.        7 BM weekly   Endocrine: Negative for cold " intolerance, heat intolerance, polydipsia, polyphagia and polyuria.        Sex Drive  He is a 0  She is a 0   Genitourinary:  Negative for difficulty urinating, dysuria, frequency, hematuria and urgency.   Musculoskeletal:  Positive for neck pain and neck stiffness. Negative for back pain, joint swelling and myalgias.   Skin:  Negative for color change, dry skin and rash.   Neurological:  Negative for dizziness, syncope, speech difficulty, weakness, light-headedness, headache and memory problem.   Hematological:  Does not bruise/bleed easily.   Psychiatric/Behavioral:  Negative for decreased concentration, sleep disturbance, depressed mood and stress. The patient is not nervous/anxious.        Objective   There were no vitals taken for this visit.  Physical Exam  Constitutional:       General: He is not in acute distress.     Appearance: He is well-developed. He is not diaphoretic.   HENT:      Head: Normocephalic.      Right Ear: Hearing, tympanic membrane, ear canal and external ear normal.      Left Ear: Hearing, tympanic membrane, ear canal and external ear normal.      Nose: Nose normal.      Mouth/Throat:      Lips: Pink.      Mouth: Mucous membranes are moist.      Pharynx: Oropharynx is clear. No oropharyngeal exudate.   Eyes:      General: Lids are normal. No scleral icterus.        Right eye: No discharge.         Left eye: No discharge.      Extraocular Movements: Extraocular movements intact.      Conjunctiva/sclera: Conjunctivae normal.      Pupils: Pupils are equal, round, and reactive to light.      Comments: Wears glasses.   Neck:      Trachea: Trachea normal.   Cardiovascular:      Rate and Rhythm: Normal rate and regular rhythm.      Pulses:           Dorsalis pedis pulses are 1+ on the right side and 1+ on the left side.        Posterior tibial pulses are 0 on the right side and 1+ on the left side.      Heart sounds: Normal heart sounds. No murmur heard.  Pulmonary:      Effort: Pulmonary effort  is normal.      Breath sounds: Normal breath sounds. No wheezing.   Chest:      Chest wall: No tenderness.   Breasts:     Right: Normal. No swelling, bleeding, inverted nipple, mass, nipple discharge, skin change or tenderness.      Left: Normal. No swelling, bleeding, inverted nipple, mass, nipple discharge, skin change or tenderness.   Abdominal:      General: Bowel sounds are normal. There is no distension.      Palpations: Abdomen is soft. There is no mass.      Tenderness: There is no abdominal tenderness.      Hernia: No hernia is present.   Genitourinary:     Penis: Normal. No tenderness.       Testes: Normal.      Comments: Pt. Declined prostate exam/rectal, he has had radiation  Musculoskeletal:         General: No tenderness or deformity. Normal range of motion.      Cervical back: Full passive range of motion without pain, normal range of motion and neck supple.   Lymphadenopathy:      Cervical: No cervical adenopathy.   Skin:     General: Skin is warm and dry.      Findings: No erythema or rash.      Comments: Onychomycosis mild of the left toes and mild of the right Great and 3rd toes.  Blue nevus right hip.  Seb. Keratosis scattered over the back.  Skin tag left axillae.  Erythematous lesion of the right upper chest.  Pigmented lesion of the mid right sternum.       Neurological:      General: No focal deficit present.      Mental Status: He is alert and oriented to person, place, and time.      Cranial Nerves: Cranial nerves 2-12 are intact. No cranial nerve deficit.      Sensory: Sensation is intact. No sensory deficit.      Motor: Motor function is intact. No abnormal muscle tone.      Coordination: Coordination is intact. Coordination normal.      Gait: Gait is intact.      Deep Tendon Reflexes: Reflexes normal.   Psychiatric:         Behavior: Behavior normal.         Thought Content: Thought content normal.         Judgment: Judgment normal.         Assessment & Plan   Problem List Items  Addressed This Visit          High    Malignant neoplasm of prostate    Current Assessment & Plan     Doing well         Situational stress    Current Assessment & Plan     Improved.  Patient tolerated Celexa well without side effects. I feel the benefits of the medication outweigh the risks.          Stroke with cerebral ischemia    Current Assessment & Plan     Advised to lower risk factors.  Discussed starting ASA, pt. Declines at present.  Advised to discuss use of Plavix with Dr. Seiple.            Medium    Diabetic nephropathy associated with type 2 diabetes mellitus    Current Assessment & Plan     Improved.  Microalbumin/Creatinine ratio done 1-, read by me, reviewed with pt.  Microalbumin/creationine ratio was 73 down from 199         Relevant Medications    metFORMIN ER (GLUCOPHAGE-XR) 500 MG 24 hr tablet    insulin NPH (NovoLIN N ReliOn) 100 UNIT/ML injection    insulin regular (NovoLIN R) 100 UNIT/ML injection    Hyperlipidemia - Primary    Overview     LDL goal less than 70         Current Assessment & Plan     Lipid and CMP done 1-, read by me, reviewed with pt.  Trig. 220 up from 109, Tot. Chol. 156 up from 126, HDL 38 up from 34, LDL 81 up from 71  Worsening.   Encouraged to watch fatty intake, exercise more, and lose weight.   Compliant with medication.  Patient tolerated Lovastatin well without side effects. I feel the benefits of the medication outweigh the risks.   Is not getting adequate diet and exercise  Goals developed at last visit were not met   Follow up in 3 months  Care management needs are self-addressed Self-management abilities addressed and patient is capable of managing his own disease.           Relevant Medications    lovastatin (MEVACOR) 40 MG tablet    Other Relevant Orders    Lipid Panel With / Chol / HDL Ratio    Comprehensive Metabolic Panel    Hypertension, benign    Current Assessment & Plan     Doing well.  Patient tolerated Norvasc, Lisinopril and  Metoprolol well without side effects. I feel the benefits of the medication outweigh the risks.   Encouraged to watch salt, exercise more and lose weight.           Relevant Medications    metoprolol succinate XL (TOPROL-XL) 50 MG 24 hr tablet    amLODIPine (NORVASC) 10 MG tablet    lisinopril (PRINIVIL,ZESTRIL) 20 MG tablet    Type 2 diabetes mellitus with both eyes affected by mild nonproliferative retinopathy without macular edema, without long-term current use of insulin    Overview     Novolin N  50 units at HS and 40 units in the AM    Novolin R 20 units with breakfast and 20 units at Supper.         Current Assessment & Plan     A1c done 11-, read by me, reviewed with pt.  A1c was 7.0 up from 6.6  Worsening.  Start Metformin XL.  Encouraged to watch sugar intake, exercise more and lose weight.   Non-compliant with medication.  Patient taking both Novolin N and R at the same time, advised to take separate.  Patient tolerated Trulicity, Novolin N and R well without side effects. I feel the benefits of the medication outweigh the risks.   Monitoring sugar at home.   Follow up in 3 months  Care management needs are self-addressed. Self-management abilities addressed and patient is capable of managing his own disease.           Relevant Medications    metFORMIN ER (GLUCOPHAGE-XR) 500 MG 24 hr tablet    insulin NPH (NovoLIN N ReliOn) 100 UNIT/ML injection    insulin regular (NovoLIN R) 100 UNIT/ML injection    Other Relevant Orders    Hemoglobin A1c       Low    Left atrial enlargement    Current Assessment & Plan     Advised to keep B/P in good control         Relevant Medications    metoprolol succinate XL (TOPROL-XL) 50 MG 24 hr tablet    amLODIPine (NORVASC) 10 MG tablet    Obstructive sleep apnea    Current Assessment & Plan     Doing well with C-pap         Seasonal allergic rhinitis due to pollen    Current Assessment & Plan     Doing well.  Patient tolerated Clairtin and Nasacort well without side  effects. I feel the benefits of the medication outweigh the risks.          Vitamin B12 deficiency    Overview                Current Assessment & Plan     Doing well.  Vit. B12 done 1-, read by me, reviewed with pt.  Vit. B 12 was 349 up from 244            Unprioritized    1st degree AV block    Current Assessment & Plan     New dx  EKG done today, read by me, reviewed with pt.  EKG showed NSR with vent rate of 77, 1st degree AV block, new from 7-5-2018.  Will repeat with next PE.         Relevant Medications    metoprolol succinate XL (TOPROL-XL) 50 MG 24 hr tablet    Cataract of both eyes    Current Assessment & Plan     Stable.  Followed with Dr. Manzo         Cervical disc disease    Current Assessment & Plan     New dx.  Discussed Xray, pt. Declines at present.  Start Baclofen.  Patient tolerated Gabapentin well without side effects. I feel the benefits of the medication outweigh the risks.          Relevant Medications    baclofen (LIORESAL) 10 MG tablet    Colon cancer screening    Current Assessment & Plan     Discussed colonoscopy and Cologuard, pt. Declines both.         COVID-19 virus infection    Current Assessment & Plan     Improving         Elevated liver function tests    Current Assessment & Plan     Resolved x 2.  CMP done 1-, read by me, reviewed with pt.  AST was normal x 3, ALT was normal x 2.  Will repeat with next labs.           Encounter for general adult medical examination with abnormal findings    Current Assessment & Plan     Encouraged to do self-breast exam, self-testicle exams, and self derm exams. Congratulated on using seat belts.  Encouraged to do annual physical exams.  Immunization status reviewed.           Family history of ischemic heart disease    Overview     -Father MI at age 74, Mother had a stent at 70.           Current Assessment & Plan     Advised to lower risk factors.         Hyperkalemia    Overview     New dx.  CMP done 1-, read by me,  reviewed with pt.  Potassium was 5.4 up from 5.2.  Advised to avoid Potassium rich foods         Hypoglycemia    Overview     2 x monthly         Immunization counseling    Overview     COVID 3-10-21, 12-6-21 and   T-Dap 5-27-15  Flu .  Advised to check with insurance on shingles coverage and get Pneu 20 at next visit.         Lethargy    Current Assessment & Plan     Mild.  CBC and TSH done 1-, read by me, reviewed with pt.  TSH was normal, CBC showed Platelets was 461 up from 358         Neoplasm, uncertain whether benign or malignant    Current Assessment & Plan     Advised to follow with dermatology         Overweight    Current Assessment & Plan     Patient's (There is no height or weight on file to calculate BMI.) indicates that they are overweight with health conditions that include hypertension, diabetes mellitus, and dyslipidemias . Weight is worsening. BMI is is above average; BMI management plan is completed. We discussed portion control and increasing exercise.          Screening PSA (prostate specific antigen)    Current Assessment & Plan     Doing well.  PSA done 1-, read by me, reviewed with pt.  PSA was 0.4 up from 0.3         Thrombocytosis    Current Assessment & Plan     New dx.  CBC done 1-, read by me, reviewed with pt.  Platelets was 461 up from 358.  Will repeat with next labs.         Relevant Orders    CBC & Differential     Other Visit Diagnoses       Thrombocytopenia        Type 2 diabetes mellitus without complication, with long-term current use of insulin        Relevant Medications    metFORMIN ER (GLUCOPHAGE-XR) 500 MG 24 hr tablet    insulin NPH (NovoLIN N ReliOn) 100 UNIT/ML injection    insulin regular (NovoLIN R) 100 UNIT/ML injection    Right leg pain        Resolved, thus delete.                 Encouraged to check his skin, testicles and breasts monthly. Reviewed exercising regularly, eating a balanced diet, immunizations and if due, patient  counselled to check with insurance company for coverage;, and regularly checking skin and breasts.

## 2024-01-23 NOTE — ASSESSMENT & PLAN NOTE
Mild.  CBC and TSH done 1-, read by me, reviewed with pt.  TSH was normal, CBC showed Platelets was 461 up from 358

## 2024-01-23 NOTE — PROGRESS NOTES
"The ABCs of the Annual Wellness Visit  [place] to Medicare Visit    Subjective     Thor Crouch is a 63 y.o. male who presents for a  Welcome to Medicare Visit.    The following portions of the patient's history were reviewed and   updated as appropriate: allergies, current medications, past family history, past medical history, past social history, past surgical history, and problem list.     Compared to one year ago, the patient feels his physical   health is the same.    Compared to one year ago, the patient feels his mental   health is the same.    Recent Hospitalizations:  He was not admitted to the hospital during the last year.       Current Medical Providers:  Hayes Sharp-PCP  Dr. Manzo-Eye  Dr. Seiple-Neurologist          Outpatient Medications Prior to Visit   Medication Sig Dispense Refill    Accu-Chek FastClix Lancets misc CHECK BLOOD SUGAR THREE TIMES DAILY 102 each 5    citalopram (CeleXA) 10 MG tablet Take 1 tablet by mouth Daily. 90 tablet 1    clopidogrel (PLAVIX) 75 MG tablet Take 1 tablet by mouth Daily. 90 tablet 04    Dulaglutide (Trulicity) 1.5 MG/0.5ML solution pen-injector Inject 1.5 mg under the skin into the appropriate area as directed 1 (One) Time Per Week. 9 mL 5    Dulaglutide 1.5 MG/0.5ML solution pen-injector Inject  under the skin into the appropriate area as directed.      fluticasone (Flonase Allergy Relief) 50 MCG/ACT nasal spray 2 sprays by Each Nare route Daily. Shake well before using. 16 g 3    glucose blood (Accu-Chek Guide) test strip 1 each by Other route 3 (Three) Times a Day. 100 each 12    Insulin Syringe-Needle U-100 (ReliOn Insulin Syringe) 31G X 15/64\" 1 ML misc Inject 1 syringe under the skin into the appropriate area as directed 2 (Two) Times a Day. 90 each 0    loratadine (Claritin) 10 MG tablet Take 1 tablet by mouth Daily. 90 tablet 0    ReliOn Insulin Syringe 31G X 15/64\" 1 ML misc Inject 80 Units under the skin into the appropriate area as directed 3 (Three) " Times a Day. 100 each 12    amLODIPine (NORVASC) 10 MG tablet Take 1 tablet by mouth Daily. 90 tablet 0    insulin NPH (NovoLIN N ReliOn) 100 UNIT/ML injection Inject 90 units under the skin at hs 30 mL 12    insulin regular (NovoLIN R) 100 UNIT/ML injection Inject 30 Units under the skin into the appropriate area as directed Daily. Take within 30 minutes of beginning largest  meal. 1 each 12    lisinopril (PRINIVIL,ZESTRIL) 20 MG tablet Take 1 tablet by mouth 2 (Two) Times a Day. 180 tablet 1    lovastatin (MEVACOR) 40 MG tablet TAKE 1 TABLET BY MOUTH EVERY EVENING 90 tablet 0    metoprolol succinate XL (TOPROL-XL) 50 MG 24 hr tablet Take 1 tablet by mouth Daily. 90 tablet 3    gabapentin (NEURONTIN) 100 MG capsule Take 1 capsule by mouth 3 (Three) Times a Day As Needed (moderate pain). 30 capsule 1    guaiFENesin-codeine (GUAIFENESIN AC) 100-10 MG/5ML liquid Take 5 mL by mouth 3 (Three) Times a Day As Needed for Cough. 118 mL 0    pseudoephedrine-codeine-guaifenesin (MYTUSSIN DAC) 30- MG/5ML solution Take 10 mL by mouth 4 (Four) Times a Day As Needed for Allergies. 118 mL 0    cyclobenzaprine (FLEXERIL) 10 MG tablet Take 0.5-1 tablets by mouth At Night As Needed for Muscle Spasms. (Patient not taking: Reported on 1/15/2024) 30 tablet 2    metFORMIN (GLUCOPHAGE) 1000 MG tablet Take 1 tablet by mouth Daily With Breakfast. (Patient not taking: Reported on 1/23/2024) 90 tablet 1     Facility-Administered Medications Prior to Visit   Medication Dose Route Frequency Provider Last Rate Last Admin    cyanocobalamin injection 1,000 mcg  1,000 mcg Intramuscular Q28 Days Hayes Sharp MD   1,000 mcg at 07/22/20 1727    cyanocobalamin injection 1,000 mcg  1,000 mcg Intramuscular Q28 Days Hayes Sharp MD   1,000 mcg at 11/02/20 1725       No opioid medication identified on active medication list. I have reviewed chart for other potential  high risk medication/s and harmful drug interactions in the  elderly.        Aspirin is not on active medication list.  Aspirin use is not indicated based on review of current medical condition/s. Risk of harm outweighs potential benefits.  .    Patient Active Problem List   Diagnosis    Cataract of both eyes    Diabetic nephropathy associated with type 2 diabetes mellitus    Family history of ischemic heart disease    Hyperlipidemia    Hypertension, benign    Left atrial enlargement    Obstructive sleep apnea    Overweight    Seasonal allergic rhinitis due to pollen    Situational stress    Type 2 diabetes mellitus with both eyes affected by mild nonproliferative retinopathy without macular edema, without long-term current use of insulin    Vitamin B12 deficiency    Stroke with cerebral ischemia    Malignant neoplasm of prostate    Neoplasm, uncertain whether benign or malignant    Hypoglycemia    Encounter for general adult medical examination with abnormal findings    Lethargy    Colon cancer screening    Elevated liver function tests    COVID-19 virus infection    Screening PSA (prostate specific antigen)    Immunization counseling    Welcome to Medicare preventive visit    Advance care planning    Depression screen    Hyperkalemia    Cervical disc disease    Thrombocytosis    1st degree AV block       Above medical problems all reviewed and significant problems identified and reviewed in the E and M visit.     Past Medical History:   Diagnosis Date    Cataract of both eyes     Other cataract of both eyes; Impression: Stable    Diabetic nephropathy associated with type 2 diabetes mellitus     Impression: Protein normal 06/18, will continue to repeat.    Epiretinal membrane, left     Impression: Followed with Dr. Murcia    History of prostate cancer     Impression: Patient has a follow up appt with Dr. Taylor NP today in [place], discussed with patient about transferring to Dr. Li or Michelle, so he can see them in the [place] office.    Hypertension, benign      Impression: Good control; Encouraged to watch salt, exercise more and lose weight    Left atrial enlargement     Impression: Stable, will follow conservatively    Malignant neoplasm of prostate     Impression: Doing well. Followed with Dr. Washington    Microcytic anemia     Impression: cbc with next labs    Overweight     >25    Seasonal allergic rhinitis due to pollen     Impression: Doing well    Situational stress     Impression: Stable    Skin lesion     Unspecified Skin Lesion; Impression: Will obtain notes from [facility].    Stroke with cerebral ischemia     Impression: Doing well at this time. Will obtain records from Dr. Seipel, regarding medication Plavix.    Type 2 diabetes mellitus with both eyes affected by mild nonproliferative retinopathy without macular edema, without long-term current use of insulin     Impression: Uncertain control; Advised patient to start monitoring blood sugar at home since taking OTC cough/cold medications.    Vitamin B12 deficiency     Impression: Patient was supposed to recieve a B12 injection, however patient left before it was done. Patient was called to come back to get this injection.      Past Surgical History:   Procedure Laterality Date    PROSTATE SURGERY        Family History   Problem Relation Age of Onset    Heart disease Mother         ischemic    Arthritis Mother     Goiter Father     Heart disease Father         ischemic    Heart attack Father     Diabetes Sister         diabetes mellitus    Diabetes Brother         diabetes mellitus    Diabetes Maternal Uncle         diabetes mellitus    Diabetes Paternal Grandmother         diabetes mellitus    Stroke Paternal Grandfather     Kidney failure Other         Uncle    Arthritis Other         grandmother      Social History     Socioeconomic History    Marital status:    Tobacco Use    Smoking status: Never     Passive exposure: Never    Smokeless tobacco: Never   Vaping Use    Vaping Use: Never  "used   Substance and Sexual Activity    Alcohol use: No    Drug use: No    Sexual activity: Defer      Allergies   Allergen Reactions    Sulfa Antibiotics Dizziness        Advance Care Planning   Advance Care Planning  Discussion with Patientregarding advanced directives. POST form discussed at length and reviewed with patient. I spent over 16 minutes  with patient reviewing information and documenting  in the chart.  Patient states he does want CPR. Reviewed medical interventions with patient and the differences between each: Comfort, Limited and Full. Patient opted for Full. Discussed the use of antibiotics at the end of life. He chose to use antibiotics consistent with treatment goals. Discussed artificially administered nutrition, patient is aware that if he is alert and oriented they can change their mind at any time. However, they have elected to have no artificial nutrition. Patient has identified his spouse Evelina Crouch as his healthcare representative. Advised to discuss with healthcare representative if they can comply with wishes. Patient encouraged to have a meeting to discuss his decision regarding advanced care directives and goals of care with extended family and significant  friends  In regard to the POST form:The patient opted to complete POST while in the office and copy scanned into the chart. and advised to give to family members, place in an easily accessible place and take with them if going to the hospital or Emergency room.            Objective   Vitals:    01/23/24 1253   BP: 134/80   BP Location: Left arm   Patient Position: Sitting   Cuff Size: Large Adult   Pulse: 96   Resp: 18   Temp: 97.1 °F (36.2 °C)   TempSrc: Temporal   SpO2: 99%   Weight: 122 kg (269 lb 9.6 oz)   Height: 177.8 cm (70\")   PainSc:   6     Estimated body mass index is 38.68 kg/m² as calculated from the following:    Height as of this encounter: 177.8 cm (70\").    Weight as of this encounter: 122 kg (269 lb 9.6 oz).     "       Does the patient have evidence of cognitive impairment?   No    Lab Results   Component Value Date    CHLPL 156 01/15/2024    TRIG 220 (H) 01/15/2024    HDL 38 (L) 01/15/2024    LDL 81 01/15/2024    VLDL 37 01/15/2024    HGBA1C 7.0 (A) 11/29/2023            HEALTH RISK ASSESSMENT    Smoking Status:  Social History     Tobacco Use   Smoking Status Never    Passive exposure: Never   Smokeless Tobacco Never     Alcohol Consumption:  Social History     Substance and Sexual Activity   Alcohol Use No       Fall Risk Screen:    STEADI Fall Risk Assessment was completed, and patient is at LOW risk for falls.Assessment completed on:1/23/2024    Depression Screen:       1/23/2024     1:02 PM   PHQ-2/PHQ-9 Depression Screening   Little Interest or Pleasure in Doing Things 0-->not at all   Feeling Down, Depressed or Hopeless 0-->not at all   PHQ-9: Brief Depression Severity Measure Score 0       Health Habits and Functional and Cognitive Screening:       No data to display                Visual Acuity:    No results found.    Age-appropriate Screening Schedule:  Refer to the list below for future screening recommendations based on patient's age, sex and/or medical conditions. Orders for these recommended tests are listed in the plan section. The patient has been provided with a written plan.    Health Maintenance   Topic Date Due    COLORECTAL CANCER SCREENING  Never done    ZOSTER VACCINE (1 of 2) Never done    Pneumococcal Vaccine 65+ (2 of 2 - PCV) 12/12/2008    HEPATITIS C SCREENING  Never done    ANNUAL WELLNESS VISIT  Never done    DIABETIC FOOT EXAM  04/21/2021    INFLUENZA VACCINE  08/01/2023    COVID-19 Vaccine (3 - 2023-24 season) 09/01/2023    HEMOGLOBIN A1C  05/29/2024    LIPID PANEL  01/15/2025    URINE MICROALBUMIN  01/15/2025    BMI FOLLOWUP  01/23/2025    DIABETIC EYE EXAM  02/01/2025    TDAP/TD VACCINES (3 - Td or Tdap) 05/27/2027        CMS Preventative Services Quick Reference  Risk Factors Identified  During Encounter    Dental Screening Recommended  The above risks/problems have been discussed with the patient.  Pertinent information has been shared with the patient in the After Visit Summary.  Follow up plans and orders are seen below in the Assessment/Plan Section.    Diagnoses and all orders for this visit:    1. Advance care planning (Primary)  Assessment & Plan:  Completed 1-23-24 and scanned to chart      2. Depression screen  Assessment & Plan:  Completed 1-23-24, treated with Celexa and doing well      3. Welcome to Medicare preventive visit  Assessment & Plan:  Completed 1-23-24      4. Immunization counseling  Assessment & Plan:  COVID 3-10-21, 12-6-21 and   T-Dap 5-27-15  Flu .  Advised to check with insurance on shingles coverage and get Pneu 20 at next visit.          Follow Up:   Initial Medicare Visit in one year    An After Visit Summary and PPPS were made available to the patient.

## 2024-01-25 NOTE — PROGRESS NOTES
"Chief Complaint  Sleep Apnea    Subjective          Thor Crouch presents to [facility]  History of Present Illness  Yearly f/u for CPAP compliance, doing well with pap therapy. He wears a nasal mask and gets supplies on line.      SLEEP TESTING HISTORY:    On NPSG at [facility] , 10/12/2018 patient had Moderate obstructive sleep apnea syndrome with apnea-hypopnea index of 17.2 per sleep hour, minimum SpO2 of 82%    PAP download:  The patient is on CPAP therapy at 9-14 cm/H2O.   Data indicates Excellent compliance. With 98% usage for more than 4 hours with an average usage of 9 hours 39 minutes. AHI down to 3.5 .  Average pressures 9.6.  Average large leak 2min.     The patient's hypersomnia has resolved       [place] Sleepiness Scale:  Sitting and reading 1 WatchingTV 0  Sitting, inactive, in a public place 0  As a passenger in a car for 1 hour w/o a break  0  Lying down to rest in the afternoon  1  Sitting and talking to someone  0  Sitting quietly after a lunch  0  In a car, while stopped for traffic or a light  0  Total 2    Review of Systems   Constitutional:  Negative for fatigue.   Respiratory:  Negative for shortness of breath.    Psychiatric/Behavioral:  Negative for sleep disturbance.    All other systems reviewed and are negative.        Objective   Vital Signs:   /71   Pulse 87   Ht 177.8 cm (70\")   Wt 122 kg (268 lb)   BMI 38.45 kg/m²     Physical Exam  Vitals reviewed.   Cardiovascular:      Rate and Rhythm: Normal rate.      Pulses: Normal pulses.   Pulmonary:      Effort: Pulmonary effort is normal.   Neurological:      General: No focal deficit present.      Mental Status: He is alert and oriented to person, place, and time.   Psychiatric:         Mood and Affect: Mood normal.        Result Review :                 Assessment and Plan    Diagnoses and all orders for this visit:    1. Obstructive sleep apnea (Primary)    2. History of stroke      Continue cpap at " 9-14  The patient is compliant with and benefiting from PAP therapy.      Follow Up   Return in about 1 year (around 1/30/2025).    Patient was given instructions and counseling regarding his condition or for health maintenance advice. Please see specific information pulled into the AVS if appropriate.       This document has been electronically signed by Joseph Seipel, MD on January 30, 2024 17:06 EST

## 2024-01-30 ENCOUNTER — OFFICE VISIT (OUTPATIENT)
Dept: NEUROLOGY | Facility: CLINIC | Age: 64
End: 2024-01-30
Payer: MEDICARE

## 2024-01-30 VITALS
HEIGHT: 70 IN | DIASTOLIC BLOOD PRESSURE: 71 MMHG | WEIGHT: 268 LBS | SYSTOLIC BLOOD PRESSURE: 128 MMHG | BODY MASS INDEX: 38.37 KG/M2 | HEART RATE: 87 BPM

## 2024-01-30 DIAGNOSIS — G47.33 OBSTRUCTIVE SLEEP APNEA: Primary | ICD-10-CM

## 2024-01-30 DIAGNOSIS — Z86.73 HISTORY OF STROKE: ICD-10-CM

## 2024-01-30 PROCEDURE — 1159F MED LIST DOCD IN RCRD: CPT | Performed by: PSYCHIATRY & NEUROLOGY

## 2024-01-30 PROCEDURE — 3078F DIAST BP <80 MM HG: CPT | Performed by: PSYCHIATRY & NEUROLOGY

## 2024-01-30 PROCEDURE — 99213 OFFICE O/P EST LOW 20 MIN: CPT | Performed by: PSYCHIATRY & NEUROLOGY

## 2024-01-30 PROCEDURE — 1160F RVW MEDS BY RX/DR IN RCRD: CPT | Performed by: PSYCHIATRY & NEUROLOGY

## 2024-01-30 PROCEDURE — 3074F SYST BP LT 130 MM HG: CPT | Performed by: PSYCHIATRY & NEUROLOGY

## 2024-02-03 NOTE — ASSESSMENT & PLAN NOTE
COVID 3-10-21, 12-6-21 and   T-Dap 5-27-15  Flu .  Advised to check with insurance on shingles coverage and get Pneu 20 at next visit.

## 2024-02-07 DIAGNOSIS — Z79.4 TYPE 2 DIABETES MELLITUS WITHOUT COMPLICATION, WITH LONG-TERM CURRENT USE OF INSULIN: ICD-10-CM

## 2024-02-07 DIAGNOSIS — E11.9 TYPE 2 DIABETES MELLITUS WITHOUT COMPLICATION, WITH LONG-TERM CURRENT USE OF INSULIN: ICD-10-CM

## 2024-02-07 RX ORDER — BLOOD SUGAR DIAGNOSTIC
STRIP MISCELLANEOUS 3 TIMES DAILY
Qty: 300 EACH | Refills: 3 | Status: SHIPPED | OUTPATIENT
Start: 2024-02-07

## 2024-02-07 RX ORDER — BLOOD SUGAR DIAGNOSTIC
STRIP MISCELLANEOUS 3 TIMES DAILY
Qty: 300 EACH | Refills: 2 | Status: SHIPPED | OUTPATIENT
Start: 2024-02-07

## 2024-02-29 DIAGNOSIS — F43.9 SITUATIONAL STRESS: ICD-10-CM

## 2024-02-29 RX ORDER — CITALOPRAM HYDROBROMIDE 10 MG/1
10 TABLET ORAL DAILY
Qty: 90 TABLET | Refills: 0 | Status: SHIPPED | OUTPATIENT
Start: 2024-02-29

## 2024-03-06 LAB
ALBUMIN SERPL-MCNC: 4.2 G/DL (ref 3.9–4.9)
ALBUMIN/GLOB SERPL: 1.5 {RATIO} (ref 1.2–2.2)
ALP SERPL-CCNC: 81 IU/L (ref 44–121)
ALT SERPL-CCNC: 25 IU/L (ref 0–44)
AST SERPL-CCNC: 22 IU/L (ref 0–40)
BASOPHILS # BLD AUTO: 0.1 X10E3/UL (ref 0–0.2)
BASOPHILS NFR BLD AUTO: 1 %
BILIRUB SERPL-MCNC: 0.4 MG/DL (ref 0–1.2)
BUN SERPL-MCNC: 22 MG/DL (ref 8–27)
BUN/CREAT SERPL: 20 (ref 10–24)
CALCIUM SERPL-MCNC: 9.2 MG/DL (ref 8.6–10.2)
CHLORIDE SERPL-SCNC: 104 MMOL/L (ref 96–106)
CHOLEST SERPL-MCNC: 143 MG/DL (ref 100–199)
CHOLEST/HDLC SERPL: 3.9 RATIO (ref 0–5)
CO2 SERPL-SCNC: 20 MMOL/L (ref 20–29)
CREAT SERPL-MCNC: 1.12 MG/DL (ref 0.76–1.27)
EGFRCR SERPLBLD CKD-EPI 2021: 73 ML/MIN/1.73
EOSINOPHIL # BLD AUTO: 0.3 X10E3/UL (ref 0–0.4)
EOSINOPHIL NFR BLD AUTO: 4 %
ERYTHROCYTE [DISTWIDTH] IN BLOOD BY AUTOMATED COUNT: 13.9 % (ref 11.6–15.4)
GLOBULIN SER CALC-MCNC: 2.8 G/DL (ref 1.5–4.5)
GLUCOSE SERPL-MCNC: 160 MG/DL (ref 70–99)
HBA1C MFR BLD: 7.5 % (ref 4.8–5.6)
HCT VFR BLD AUTO: 43.6 % (ref 37.5–51)
HDLC SERPL-MCNC: 37 MG/DL
HGB BLD-MCNC: 14.3 G/DL (ref 13–17.7)
IMM GRANULOCYTES # BLD AUTO: 0 X10E3/UL (ref 0–0.1)
IMM GRANULOCYTES NFR BLD AUTO: 0 %
LDLC SERPL CALC-MCNC: 83 MG/DL (ref 0–99)
LYMPHOCYTES # BLD AUTO: 1.4 X10E3/UL (ref 0.7–3.1)
LYMPHOCYTES NFR BLD AUTO: 17 %
MCH RBC QN AUTO: 29.1 PG (ref 26.6–33)
MCHC RBC AUTO-ENTMCNC: 32.8 G/DL (ref 31.5–35.7)
MCV RBC AUTO: 89 FL (ref 79–97)
MONOCYTES # BLD AUTO: 0.7 X10E3/UL (ref 0.1–0.9)
MONOCYTES NFR BLD AUTO: 8 %
NEUTROPHILS # BLD AUTO: 5.9 X10E3/UL (ref 1.4–7)
NEUTROPHILS NFR BLD AUTO: 70 %
PLATELET # BLD AUTO: 388 X10E3/UL (ref 150–450)
POTASSIUM SERPL-SCNC: 4.9 MMOL/L (ref 3.5–5.2)
PROT SERPL-MCNC: 7 G/DL (ref 6–8.5)
RBC # BLD AUTO: 4.92 X10E6/UL (ref 4.14–5.8)
SODIUM SERPL-SCNC: 140 MMOL/L (ref 134–144)
TRIGL SERPL-MCNC: 126 MG/DL (ref 0–149)
VLDLC SERPL CALC-MCNC: 23 MG/DL (ref 5–40)
WBC # BLD AUTO: 8.4 X10E3/UL (ref 3.4–10.8)

## 2024-03-13 ENCOUNTER — OFFICE VISIT (OUTPATIENT)
Dept: FAMILY MEDICINE CLINIC | Facility: CLINIC | Age: 64
End: 2024-03-13
Payer: MEDICARE

## 2024-03-13 VITALS
SYSTOLIC BLOOD PRESSURE: 128 MMHG | WEIGHT: 276 LBS | RESPIRATION RATE: 18 BRPM | DIASTOLIC BLOOD PRESSURE: 74 MMHG | BODY MASS INDEX: 39.51 KG/M2 | OXYGEN SATURATION: 98 % | HEIGHT: 70 IN | HEART RATE: 84 BPM | TEMPERATURE: 97.7 F

## 2024-03-13 DIAGNOSIS — E11.9 TYPE 2 DIABETES MELLITUS WITHOUT COMPLICATION, WITH LONG-TERM CURRENT USE OF INSULIN: ICD-10-CM

## 2024-03-13 DIAGNOSIS — M25.562 CHRONIC PAIN OF LEFT KNEE: ICD-10-CM

## 2024-03-13 DIAGNOSIS — I10 HYPERTENSION, BENIGN: ICD-10-CM

## 2024-03-13 DIAGNOSIS — Z79.4 TYPE 2 DIABETES MELLITUS WITHOUT COMPLICATION, WITH LONG-TERM CURRENT USE OF INSULIN: ICD-10-CM

## 2024-03-13 DIAGNOSIS — D75.839 THROMBOCYTOSIS: ICD-10-CM

## 2024-03-13 DIAGNOSIS — E11.3293 TYPE 2 DIABETES MELLITUS WITH BOTH EYES AFFECTED BY MILD NONPROLIFERATIVE RETINOPATHY WITHOUT MACULAR EDEMA, WITHOUT LONG-TERM CURRENT USE OF INSULIN: ICD-10-CM

## 2024-03-13 DIAGNOSIS — E87.5 HYPERKALEMIA: ICD-10-CM

## 2024-03-13 DIAGNOSIS — G89.29 CHRONIC PAIN OF LEFT KNEE: ICD-10-CM

## 2024-03-13 DIAGNOSIS — R79.89 ELEVATED LIVER FUNCTION TESTS: ICD-10-CM

## 2024-03-13 DIAGNOSIS — E78.2 MIXED HYPERLIPIDEMIA: Primary | ICD-10-CM

## 2024-03-13 RX ORDER — AMLODIPINE BESYLATE 10 MG/1
10 TABLET ORAL DAILY
Start: 2024-03-13

## 2024-03-13 RX ORDER — ACYCLOVIR 400 MG/1
1 TABLET ORAL
Qty: 12 EACH | Refills: 12 | Status: SHIPPED | OUTPATIENT
Start: 2024-03-13

## 2024-03-13 RX ORDER — LOVASTATIN 40 MG/1
40 TABLET ORAL EVERY EVENING
Start: 2024-03-13

## 2024-03-13 RX ORDER — METOPROLOL SUCCINATE 50 MG/1
50 TABLET, EXTENDED RELEASE ORAL DAILY
Start: 2024-03-13

## 2024-03-13 RX ORDER — ACYCLOVIR 400 MG/1
1 TABLET ORAL
Qty: 12 EACH | Refills: 3 | Status: SHIPPED | OUTPATIENT
Start: 2024-03-13

## 2024-03-13 RX ORDER — HUMAN INSULIN 100 [IU]/ML
INJECTION, SUSPENSION SUBCUTANEOUS
Start: 2024-03-13

## 2024-03-13 RX ORDER — HUMAN INSULIN 100 [IU]/ML
30 INJECTION, SOLUTION SUBCUTANEOUS DAILY
Start: 2024-03-13

## 2024-03-13 RX ORDER — LISINOPRIL 20 MG/1
20 TABLET ORAL 2 TIMES DAILY
Start: 2024-03-13

## 2024-03-13 NOTE — ASSESSMENT & PLAN NOTE
Probably tear or ruptured baker's cyst, pt. Declines MRI or Ortho referral--will gradually increase activity.  If no improvement return

## 2024-03-13 NOTE — PROGRESS NOTES
Subjective   Thor Crouch is a 63 y.o. male.   Chief Complaint   Patient presents with    Hypertension    Hyperlipidemia    Diabetes     Hypertension  This is a chronic problem. The current episode started more than 1 year ago. The problem is unchanged. The problem is controlled. Pertinent negatives include no chest pain, palpitations or shortness of breath.   Hyperlipidemia  This is a chronic problem. The current episode started more than 1 year ago. The problem is controlled. Recent lipid tests were reviewed and are high. Pertinent negatives include no chest pain, myalgias or shortness of breath.   Diabetes  He presents for his follow-up diabetic visit. He has type 2 diabetes mellitus. His disease course has been worsening. Pertinent negatives for diabetes include no chest pain, no fatigue, no polyphagia, no polyuria and no weight loss.        The following portions of the patient's history were reviewed and updated as appropriate: allergies, current medications, past family history, past medical history, past social history, past surgical history, and problem list.    Past Medical History:   Diagnosis Date    Cataract of both eyes     Other cataract of both eyes; Impression: Stable    Diabetic nephropathy associated with type 2 diabetes mellitus     Impression: Protein normal 06/18, will continue to repeat.    Epiretinal membrane, left     Impression: Followed with Dr. Murcia    History of prostate cancer     Impression: Patient has a follow up appt with Dr. Taylor NP today in [place], discussed with patient about transferring to Dr. Li or Michelle, so he can see them in the [place] office.    Hypertension, benign     Impression: Good control; Encouraged to watch salt, exercise more and lose weight    Left atrial enlargement     Impression: Stable, will follow conservatively    Malignant neoplasm of prostate     Impression: Doing well. Followed with Dr. Washington    Microcytic anemia     Impression: cbc  with next labs    Overweight     >25    Seasonal allergic rhinitis due to pollen     Impression: Doing well    Situational stress     Impression: Stable    Skin lesion     Unspecified Skin Lesion; Impression: Will obtain notes from [facility].    Stroke with cerebral ischemia     Impression: Doing well at this time. Will obtain records from Dr. Seipel, regarding medication Plavix.    Type 2 diabetes mellitus with both eyes affected by mild nonproliferative retinopathy without macular edema, without long-term current use of insulin     Impression: Uncertain control; Advised patient to start monitoring blood sugar at home since taking OTC cough/cold medications.    Vitamin B12 deficiency     Impression: Patient was supposed to recieve a B12 injection, however patient left before it was done. Patient was called to come back to get this injection.       Past Surgical History:   Procedure Laterality Date    PROSTATE SURGERY          Family History   Problem Relation Age of Onset    Heart disease Mother         ischemic    Arthritis Mother     Goiter Father     Heart disease Father         ischemic    Heart attack Father     Diabetes Sister         diabetes mellitus    Diabetes Brother         diabetes mellitus    Diabetes Maternal Uncle         diabetes mellitus    Diabetes Paternal Grandmother         diabetes mellitus    Stroke Paternal Grandfather     Kidney failure Other         Uncle    Arthritis Other         grandmother        Social History     Socioeconomic History    Marital status:    Tobacco Use    Smoking status: Never     Passive exposure: Never    Smokeless tobacco: Never   Vaping Use    Vaping status: Never Used   Substance and Sexual Activity    Alcohol use: No    Drug use: No    Sexual activity: Defer         Review of Systems   Constitutional:  Negative for fatigue, unexpected weight gain and unexpected weight loss.   Eyes:  Negative for visual disturbance.   Respiratory:  Negative  "for shortness of breath.    Cardiovascular:  Negative for chest pain, palpitations and leg swelling.   Gastrointestinal:  Negative for nausea.   Endocrine: Negative for polyphagia and polyuria.   Genitourinary:  Negative for frequency.   Musculoskeletal:  Negative for myalgias.   Skin:  Negative for dry skin and skin lesions.   Neurological:  Negative for syncope, numbness and headache.       Objective   Visit Vitals  /74 (BP Location: Left arm, Patient Position: Sitting, Cuff Size: Large Adult)   Pulse 84   Temp 97.7 °F (36.5 °C) (Temporal)   Resp 18   Ht 177.8 cm (70\")   Wt 125 kg (276 lb)   SpO2 98%   BMI 39.60 kg/m²     Physical Exam  Vitals and nursing note reviewed.   Constitutional:       Appearance: He is well-developed.   HENT:      Head: Normocephalic.   Neck:      Thyroid: No thyromegaly.      Vascular: No carotid bruit.      Trachea: Trachea normal.   Cardiovascular:      Rate and Rhythm: Normal rate and regular rhythm.      Heart sounds: No murmur heard.     No friction rub. No gallop.   Pulmonary:      Effort: Pulmonary effort is normal. No respiratory distress.      Breath sounds: Normal breath sounds. No wheezing.   Chest:      Chest wall: No tenderness.   Musculoskeletal:      Cervical back: Neck supple.   Skin:     General: Skin is dry.      Findings: No rash.      Nails: There is no clubbing.   Neurological:      Mental Status: He is alert and oriented to person, place, and time.   Psychiatric:         Behavior: Behavior is cooperative.         Assessment & Plan   Problem List Items Addressed This Visit          Medium    Hyperlipidemia - Primary    Overview     LDL goal less than 70         Current Assessment & Plan      Lipid and CMP done 3-5-2024, read by me, reviewed with pt.  Trig. 126 down from 220, Tot. Chol. 143 don from 156, HDL 37 down from 38, LDL 83 down from 81  Improved.  Close to goal.  Encouraged to watch fatty intake, exercise more, and lose weight. Compliant with medication " .Patient tolerated Lovastatin well without side effects. I feel the benefits of the medication outweigh the risks.   Is not getting adequate diet and exercise  Goals developed at last visit were not met   Follow up in 3  months  Care management needs are self-addressed.  Self-management abilities addressed and patient is capable of managing his own disease.           Relevant Medications    lovastatin (MEVACOR) 40 MG tablet    Other Relevant Orders    Lipid Panel With / Chol / HDL Ratio    Comprehensive Metabolic Panel    Hypertension, benign    Current Assessment & Plan     Doing well. Patient tolerated Norvasc, Metoprolol and Lisinopril well without side effects. I feel the benefits of the medication outweigh the risks.   Encouraged to watch salt, exercise more and lose weight.           Relevant Medications    lisinopril (PRINIVIL,ZESTRIL) 20 MG tablet    amLODIPine (NORVASC) 10 MG tablet    metoprolol succinate XL (TOPROL-XL) 50 MG 24 hr tablet    Type 2 diabetes mellitus with both eyes affected by mild nonproliferative retinopathy without macular edema, without long-term current use of insulin    Overview     Novolin N  50 units at HS and 50 units in the AM    Novolin R 30 units with Lunch and 30 units at Supper.         Current Assessment & Plan     A1c done 3-5-2024, read by me, reviewed with pt. A1c was A1c 7.5 up from 7.0  Worsening. Stop Metformin due to diarrhea.  Increase N by 2 units daily until at a max of 50 units in the AM and 50 at HS.  Novolon R continue 30 units at supper and switch from breakfast to Lunch on the other injection and keep it at 30 units.  He is not taking Trulicity due to cost.  Encouraged to watch sugar intake, exercise more and lose weight. Non-compliant with medication due to diarrhea from metformin and cost of Trulicity..  Monitoring sugar at home.  These are reviewed and scanned to the chart.  Follow up in 3 months  Care management needs are self-addressed.  Self-management  abilities addressed and patient is capable of managing his own disease.           Relevant Medications    Continuous Blood Gluc  (Dexcom G7 ) device    Continuous Blood Gluc Sensor (Dexcom G7 Sensor) misc    insulin regular (NovoLIN R) 100 UNIT/ML injection    insulin NPH (NovoLIN N ReliOn) 100 UNIT/ML injection       Unprioritized    Chronic pain of left knee    Current Assessment & Plan     Probably tear or ruptured baker's cyst, pt. Declines MRI or Ortho referral--will gradually increase activity.  If no improvement return         RESOLVED: Elevated liver function tests    Current Assessment & Plan     Resolved x 3, thus delete.         Hyperkalemia    Overview     New dx.  CMP done 1-, read by me, reviewed with pt.  Potassium was 5.4 up from 5.2.  Advised to avoid Potassium rich foods         Current Assessment & Plan     Resolved x 2         Thrombocytosis    Current Assessment & Plan     Platelets was normal x 1         Relevant Orders    CBC & Differential     Other Visit Diagnoses       Type 2 diabetes mellitus without complication, with long-term current use of insulin        Relevant Medications    insulin regular (NovoLIN R) 100 UNIT/ML injection    insulin NPH (NovoLIN N ReliOn) 100 UNIT/ML injection    Other Relevant Orders    Hemoglobin A1c

## 2024-03-13 NOTE — ASSESSMENT & PLAN NOTE
Doing well. Patient tolerated Norvasc, Metoprolol and Lisinopril well without side effects. I feel the benefits of the medication outweigh the risks.   Encouraged to watch salt, exercise more and lose weight.

## 2024-03-13 NOTE — ASSESSMENT & PLAN NOTE
A1c done 3-5-2024, read by me, reviewed with pt. A1c was A1c 7.5 up from 7.0  Worsening. Stop Metformin due to diarrhea.  Increase N by 2 units daily until at a max of 50 units in the AM and 50 at HS.  Novolon R continue 30 units at supper and switch from breakfast to Lunch on the other injection and keep it at 30 units.  He is not taking Trulicity due to cost.  Encouraged to watch sugar intake, exercise more and lose weight. Non-compliant with medication due to diarrhea from metformin and cost of Trulicity..  Monitoring sugar at home.  These are reviewed and scanned to the chart.  Follow up in 3 months  Care management needs are self-addressed.  Self-management abilities addressed and patient is capable of managing his own disease.

## 2024-03-13 NOTE — ASSESSMENT & PLAN NOTE
Lipid and CMP done 3-5-2024, read by me, reviewed with pt.  Trig. 126 down from 220, Tot. Chol. 143 don from 156, HDL 37 down from 38, LDL 83 down from 81  Improved.  Close to goal.  Encouraged to watch fatty intake, exercise more, and lose weight. Compliant with medication .Patient tolerated Lovastatin well without side effects. I feel the benefits of the medication outweigh the risks.   Is not getting adequate diet and exercise  Goals developed at last visit were not met   Follow up in 3  months  Care management needs are self-addressed.  Self-management abilities addressed and patient is capable of managing his own disease.

## 2024-05-29 DIAGNOSIS — F43.9 SITUATIONAL STRESS: ICD-10-CM

## 2024-05-29 RX ORDER — CITALOPRAM HYDROBROMIDE 10 MG/1
10 TABLET ORAL DAILY
Qty: 90 TABLET | Refills: 0 | Status: SHIPPED | OUTPATIENT
Start: 2024-05-29

## 2024-06-07 LAB
ALBUMIN SERPL-MCNC: 4.4 G/DL (ref 3.9–4.9)
ALBUMIN/GLOB SERPL: 1.6 {RATIO} (ref 1.2–2.2)
ALP SERPL-CCNC: 86 IU/L (ref 44–121)
ALT SERPL-CCNC: 27 IU/L (ref 0–44)
AST SERPL-CCNC: 22 IU/L (ref 0–40)
BASOPHILS # BLD AUTO: 0.1 X10E3/UL (ref 0–0.2)
BASOPHILS NFR BLD AUTO: 1 %
BILIRUB SERPL-MCNC: 0.3 MG/DL (ref 0–1.2)
BUN SERPL-MCNC: 26 MG/DL (ref 8–27)
BUN/CREAT SERPL: 20 (ref 10–24)
CALCIUM SERPL-MCNC: 9.5 MG/DL (ref 8.6–10.2)
CHLORIDE SERPL-SCNC: 106 MMOL/L (ref 96–106)
CHOLEST SERPL-MCNC: 158 MG/DL (ref 100–199)
CHOLEST/HDLC SERPL: 4 RATIO (ref 0–5)
CO2 SERPL-SCNC: 21 MMOL/L (ref 20–29)
CREAT SERPL-MCNC: 1.28 MG/DL (ref 0.76–1.27)
EGFRCR SERPLBLD CKD-EPI 2021: 62 ML/MIN/1.73
EOSINOPHIL # BLD AUTO: 0.3 X10E3/UL (ref 0–0.4)
EOSINOPHIL NFR BLD AUTO: 3 %
ERYTHROCYTE [DISTWIDTH] IN BLOOD BY AUTOMATED COUNT: 12.3 % (ref 11.6–15.4)
GLOBULIN SER CALC-MCNC: 2.8 G/DL (ref 1.5–4.5)
GLUCOSE SERPL-MCNC: 99 MG/DL (ref 70–99)
HBA1C MFR BLD: 7.9 % (ref 4.8–5.6)
HCT VFR BLD AUTO: 47.2 % (ref 37.5–51)
HDLC SERPL-MCNC: 40 MG/DL
HGB BLD-MCNC: 15.1 G/DL (ref 13–17.7)
IMM GRANULOCYTES # BLD AUTO: 0 X10E3/UL (ref 0–0.1)
IMM GRANULOCYTES NFR BLD AUTO: 0 %
LDLC SERPL CALC-MCNC: 96 MG/DL (ref 0–99)
LYMPHOCYTES # BLD AUTO: 1.3 X10E3/UL (ref 0.7–3.1)
LYMPHOCYTES NFR BLD AUTO: 13 %
MCH RBC QN AUTO: 28.1 PG (ref 26.6–33)
MCHC RBC AUTO-ENTMCNC: 32 G/DL (ref 31.5–35.7)
MCV RBC AUTO: 88 FL (ref 79–97)
MONOCYTES # BLD AUTO: 0.8 X10E3/UL (ref 0.1–0.9)
MONOCYTES NFR BLD AUTO: 8 %
NEUTROPHILS # BLD AUTO: 7.7 X10E3/UL (ref 1.4–7)
NEUTROPHILS NFR BLD AUTO: 75 %
PLATELET # BLD AUTO: 370 X10E3/UL (ref 150–450)
POTASSIUM SERPL-SCNC: 5.5 MMOL/L (ref 3.5–5.2)
PROT SERPL-MCNC: 7.2 G/DL (ref 6–8.5)
RBC # BLD AUTO: 5.37 X10E6/UL (ref 4.14–5.8)
SODIUM SERPL-SCNC: 140 MMOL/L (ref 134–144)
TRIGL SERPL-MCNC: 121 MG/DL (ref 0–149)
VLDLC SERPL CALC-MCNC: 22 MG/DL (ref 5–40)
WBC # BLD AUTO: 10.3 X10E3/UL (ref 3.4–10.8)

## 2024-06-13 ENCOUNTER — OFFICE VISIT (OUTPATIENT)
Dept: FAMILY MEDICINE CLINIC | Facility: CLINIC | Age: 64
End: 2024-06-13
Payer: MEDICARE

## 2024-06-13 VITALS
DIASTOLIC BLOOD PRESSURE: 72 MMHG | RESPIRATION RATE: 14 BRPM | OXYGEN SATURATION: 97 % | BODY MASS INDEX: 40.83 KG/M2 | HEART RATE: 72 BPM | HEIGHT: 70 IN | WEIGHT: 285.2 LBS | SYSTOLIC BLOOD PRESSURE: 132 MMHG | TEMPERATURE: 97.8 F

## 2024-06-13 DIAGNOSIS — E66.3 OVERWEIGHT: ICD-10-CM

## 2024-06-13 DIAGNOSIS — E87.5 HYPERKALEMIA: Primary | ICD-10-CM

## 2024-06-13 DIAGNOSIS — E11.3293 TYPE 2 DIABETES MELLITUS WITH BOTH EYES AFFECTED BY MILD NONPROLIFERATIVE RETINOPATHY WITHOUT MACULAR EDEMA, WITHOUT LONG-TERM CURRENT USE OF INSULIN: ICD-10-CM

## 2024-06-13 DIAGNOSIS — E11.9 TYPE 2 DIABETES MELLITUS WITHOUT COMPLICATION, WITH LONG-TERM CURRENT USE OF INSULIN: ICD-10-CM

## 2024-06-13 DIAGNOSIS — I63.9 STROKE WITH CEREBRAL ISCHEMIA: ICD-10-CM

## 2024-06-13 DIAGNOSIS — D75.839 THROMBOCYTOSIS: ICD-10-CM

## 2024-06-13 DIAGNOSIS — F43.9 SITUATIONAL STRESS: ICD-10-CM

## 2024-06-13 DIAGNOSIS — I10 HYPERTENSION, BENIGN: ICD-10-CM

## 2024-06-13 DIAGNOSIS — Z79.4 TYPE 2 DIABETES MELLITUS WITHOUT COMPLICATION, WITH LONG-TERM CURRENT USE OF INSULIN: ICD-10-CM

## 2024-06-13 DIAGNOSIS — E78.2 MIXED HYPERLIPIDEMIA: ICD-10-CM

## 2024-06-13 RX ORDER — ACYCLOVIR 400 MG/1
1 TABLET ORAL
Qty: 12 EACH | Refills: 3 | Status: SHIPPED | OUTPATIENT
Start: 2024-06-13

## 2024-06-13 RX ORDER — ACYCLOVIR 400 MG/1
1 TABLET ORAL
Qty: 12 EACH | Refills: 12 | Status: SHIPPED | OUTPATIENT
Start: 2024-06-13

## 2024-06-13 RX ORDER — LOVASTATIN 40 MG/1
40 TABLET ORAL EVERY EVENING
Qty: 90 TABLET | Refills: 1 | Status: SHIPPED | OUTPATIENT
Start: 2024-06-13

## 2024-06-13 RX ORDER — CITALOPRAM HYDROBROMIDE 10 MG/1
10 TABLET ORAL DAILY
Qty: 90 TABLET | Refills: 0 | Status: SHIPPED | OUTPATIENT
Start: 2024-06-13

## 2024-06-13 RX ORDER — LANCETS
EACH MISCELLANEOUS
Qty: 102 EACH | Refills: 5 | Status: SHIPPED | OUTPATIENT
Start: 2024-06-13

## 2024-06-13 NOTE — TELEPHONE ENCOUNTER
Caller: Thor Crouch    Relationship: Self    Best call back number: [phone]     Requested Prescriptions:   Requested Prescriptions     Pending Prescriptions Disp Refills    Continuous Glucose Sensor (Dexcom G7 Sensor) misc 12 each 3     Sig: Use 1 Device Every 10 (Ten) Days.    Continuous Glucose  (Dexcom G7 ) device 12 each 12     Sig: Use 1 Device Every 10 (Ten) Days.        Pharmacy where request should be sent: [id number] - [address] AT [facility] OF  [address] - [phone]  - [phone] FX     Last office visit with prescribing clinician: 6/13/2024   Last telemedicine visit with prescribing clinician: Visit date not found   Next office visit with prescribing clinician: 7/25/2024       Brianna Soriano Rep   06/13/24 09:35 EDT

## 2024-06-13 NOTE — ASSESSMENT & PLAN NOTE
Lipid and CMP done 6-6-2024, read by me, reviewed with pt. Trig. 121 down from 126, Tot. Chol. 158 up from 143, HDL 40 up from 37, LDL 96 up from 83  Improved. Close to goal  Encouraged to watch fatty intake, exercise more, and lose weight.   compliant with medication  Patient tolerated lovastatin well without side effects. I feel the benefits of the medication outweigh the risks.    Is not getting adequate diet and exercise  Goals developed at last visit were met   Follow up in 3  months  Care management needs are self-addressed.  Self-management abilities addressed and patient is capable of managing his own disease.

## 2024-06-13 NOTE — PROGRESS NOTES
Subjective   Thor Crouch is a 63 y.o. male.     Hyperlipidemia  This is a chronic problem. The current episode started more than 1 year ago. The problem is controlled. Pertinent negatives include no chest pain, myalgias or shortness of breath. Current antihyperlipidemic treatment includes statins.   Diabetes  He presents for his follow-up diabetic visit. He has type 2 diabetes mellitus. His disease course has been improving. Pertinent negatives for diabetes include no chest pain, no fatigue, no polyphagia, no polyuria and no weight loss.   Hypertension  This is a chronic problem. The current episode started more than 1 year ago. The problem has been improved since onset. The problem is controlled. Pertinent negatives include no chest pain, palpitations or shortness of breath.        The following portions of the patient's history were reviewed and updated as appropriate: allergies, current medications, past family history, past medical history, past social history, past surgical history, and problem list.    Family History   Problem Relation Age of Onset    Heart disease Mother         ischemic    Arthritis Mother     Goiter Father     Heart disease Father         ischemic    Heart attack Father     Diabetes Sister         diabetes mellitus    Diabetes Brother         diabetes mellitus    Diabetes Maternal Uncle         diabetes mellitus    Diabetes Paternal Grandmother         diabetes mellitus    Stroke Paternal Grandfather     Kidney failure Other         Uncle    Arthritis Other         grandmother       Social History     Tobacco Use    Smoking status: Never     Passive exposure: Never    Smokeless tobacco: Never   Vaping Use    Vaping status: Never Used   Substance Use Topics    Alcohol use: No    Drug use: No       Past Surgical History:   Procedure Laterality Date    PROSTATE SURGERY         Patient Active Problem List   Diagnosis    Cataract of both eyes    Diabetic nephropathy associated with type 2  "diabetes mellitus    Family history of ischemic heart disease    Hyperlipidemia    Hypertension, benign    Left atrial enlargement    Obstructive sleep apnea    Overweight    Seasonal allergic rhinitis due to pollen    Situational stress    Type 2 diabetes mellitus with both eyes affected by mild nonproliferative retinopathy without macular edema, without long-term current use of insulin    Vitamin B12 deficiency    Stroke with cerebral ischemia    Malignant neoplasm of prostate    Neoplasm, uncertain whether benign or malignant    Hypoglycemia    Encounter for general adult medical examination with abnormal findings    Lethargy    Colon cancer screening    COVID-19 virus infection    Screening PSA (prostate specific antigen)    Immunization counseling    Welcome to Medicare preventive visit    Advance care planning    Depression screen    Hyperkalemia    Cervical disc disease    Thrombocytosis    1st degree AV block    Chronic pain of left knee       Current Outpatient Medications on File Prior to Visit   Medication Sig Dispense Refill    amLODIPine (NORVASC) 10 MG tablet Take 1 tablet by mouth Daily.      clopidogrel (PLAVIX) 75 MG tablet Take 1 tablet by mouth Daily. 90 tablet 04    fluticasone (Flonase Allergy Relief) 50 MCG/ACT nasal spray 2 sprays by Each Nare route Daily. Shake well before using. 16 g 3    glucose blood (Accu-Chek Guide) test strip USE THREE TIMES DAILY 300 each 3    glucose blood (Accu-Chek Guide) test strip USE THREE TIMES DAILY 300 each 2    insulin NPH (NovoLIN N ReliOn) 100 UNIT/ML injection Inject 90 units under the skin at hs      insulin regular (NovoLIN R) 100 UNIT/ML injection Inject 30 Units under the skin into the appropriate area as directed Daily. Take within 30 minutes of beginning largest  meal.      Insulin Syringe-Needle U-100 (ReliOn Insulin Syringe) 31G X 15/64\" 1 ML misc Inject 1 syringe under the skin into the appropriate area as directed 2 (Two) Times a Day. 90 each 0 " "   lisinopril (PRINIVIL,ZESTRIL) 20 MG tablet Take 1 tablet by mouth 2 (Two) Times a Day.      metoprolol succinate XL (TOPROL-XL) 50 MG 24 hr tablet Take 1 tablet by mouth Daily.      ReliOn Insulin Syringe 31G X 15/64\" 1 ML misc Inject 80 Units under the skin into the appropriate area as directed 3 (Three) Times a Day. 100 each 12    baclofen (LIORESAL) 10 MG tablet Take 1 tablet by mouth 3 (Three) Times a Day. (Patient not taking: Reported on 3/13/2024) 90 tablet 1    Dulaglutide (Trulicity) 1.5 MG/0.5ML solution pen-injector Inject 1.5 mg under the skin into the appropriate area as directed 1 (One) Time Per Week. (Patient not taking: Reported on 3/13/2024) 9 mL 5    Dulaglutide 1.5 MG/0.5ML solution pen-injector Inject  under the skin into the appropriate area as directed. (Patient not taking: Reported on 3/13/2024)      gabapentin (NEURONTIN) 100 MG capsule Take 1 capsule by mouth 3 (Three) Times a Day As Needed (moderate pain). (Patient not taking: Reported on 3/13/2024) 30 capsule 1    guaiFENesin-codeine (GUAIFENESIN AC) 100-10 MG/5ML liquid Take 5 mL by mouth 3 (Three) Times a Day As Needed for Cough. (Patient not taking: Reported on 3/13/2024) 118 mL 0    loratadine (Claritin) 10 MG tablet Take 1 tablet by mouth Daily. (Patient not taking: Reported on 6/13/2024) 90 tablet 0    pseudoephedrine-codeine-guaifenesin (MYTUSSIN DAC) 30- MG/5ML solution Take 10 mL by mouth 4 (Four) Times a Day As Needed for Allergies. (Patient not taking: Reported on 3/13/2024) 118 mL 0     Current Facility-Administered Medications on File Prior to Visit   Medication Dose Route Frequency Provider Last Rate Last Admin    cyanocobalamin injection 1,000 mcg  1,000 mcg Intramuscular Q28 Days Hayes Sharp MD   1,000 mcg at 07/22/20 1727    cyanocobalamin injection 1,000 mcg  1,000 mcg Intramuscular Q28 Days Hayes Sharp MD   1,000 mcg at 11/02/20 1725       Allergies   Allergen Reactions    Sulfa Antibiotics Dizziness " "      Review of Systems   Constitutional:  Negative for fatigue, unexpected weight gain and unexpected weight loss.   Eyes:  Negative for visual disturbance.   Respiratory:  Negative for shortness of breath.    Cardiovascular:  Negative for chest pain, palpitations and leg swelling.   Gastrointestinal:  Negative for nausea.   Endocrine: Negative for polyphagia and polyuria.   Genitourinary:  Negative for frequency.   Musculoskeletal:  Negative for myalgias.   Skin:  Negative for dry skin and skin lesions.   Neurological:  Negative for syncope, numbness and headache.       Objective   Visit Vitals  /72 (BP Location: Left arm, Patient Position: Sitting, Cuff Size: Large Adult)   Pulse 72   Temp 97.8 °F (36.6 °C)   Resp 14   Ht 177.8 cm (70\")   Wt 129 kg (285 lb 3.2 oz)   SpO2 97%   BMI 40.92 kg/m²     Physical Exam  Vitals and nursing note reviewed.   Constitutional:       Appearance: He is well-developed.   HENT:      Head: Normocephalic.   Neck:      Thyroid: No thyromegaly.      Vascular: No carotid bruit.      Trachea: Trachea normal.   Cardiovascular:      Rate and Rhythm: Normal rate and regular rhythm.      Heart sounds: No murmur heard.     No friction rub. No gallop.   Pulmonary:      Effort: Pulmonary effort is normal. No respiratory distress.      Breath sounds: Normal breath sounds. No wheezing.   Chest:      Chest wall: No tenderness.   Musculoskeletal:      Cervical back: Neck supple.   Skin:     General: Skin is dry.      Findings: No rash.      Nails: There is no clubbing.   Neurological:      Mental Status: He is alert and oriented to person, place, and time.   Psychiatric:         Behavior: Behavior is cooperative.           Assessment & Plan .  Problem List Items Addressed This Visit          High    Situational stress    Current Assessment & Plan     Doing well; Patient tolerated celexa well without side effects. I feel the benefits of the medication outweigh the risks.           Relevant " Medications    citalopram (CeleXA) 10 MG tablet    Stroke with cerebral ischemia    Current Assessment & Plan     Keep risk factors low            Medium    Hyperlipidemia    Overview     LDL goal less than 70         Current Assessment & Plan      Lipid and CMP done 6-6-2024, read by me, reviewed with pt. Trig. 121 down from 126, Tot. Chol. 158 up from 143, HDL 40 up from 37, LDL 96 up from 83  Improved. Close to goal  Encouraged to watch fatty intake, exercise more, and lose weight.   compliant with medication  Patient tolerated lovastatin well without side effects. I feel the benefits of the medication outweigh the risks.    Is not getting adequate diet and exercise  Goals developed at last visit were met   Follow up in 3  months  Care management needs are self-addressed.  Self-management abilities addressed and patient is capable of managing his own disease.           Relevant Medications    lovastatin (MEVACOR) 40 MG tablet    Hypertension, benign    Current Assessment & Plan     Improving-Encouraged to watch salt, exercise more and lose weight.           Type 2 diabetes mellitus with both eyes affected by mild nonproliferative retinopathy without macular edema, without long-term current use of insulin    Overview     Novolin N  50 units at HS and 50 units in the AM    Novolin R 30 units with Lunch and 30 units at Supper.         Current Assessment & Plan     A1c done 6-6-2024, read by me, reviewed with pt. A1c was 7.9 up from 7.5  Worsening. Patient tolerated Insulin well without side effects. I feel the benefits of the medication outweigh the risks.  Patient will get DexCom and return in 1 month  Encouraged to watch sugar intake, exercise more and lose weight.   Compliant with medication.   Not monitoring sugar at home.   Follow up in 1 months  Care management needs are self-addressed.  Self-management abilities addressed and patient is capable of managing his own disease.              Unprioritized     Hyperkalemia - Primary    Overview     New dx.  CMP done 1-, read by me, reviewed with pt.  Potassium was 5.4 up from 5.2.  Advised to avoid Potassium rich foods         Current Assessment & Plan     CMP done 6-6-2024, read by me, reviewed with pt.   Potassium was 5.5 up from 4.9         Overweight    Current Assessment & Plan     Patient's (Body mass index is 40.92 kg/m².) indicates that they are overweight with health conditions that include hypertension, diabetes mellitus, and dyslipidemias . Weight is unchanged. BMI is is above average; BMI management plan is completed. We discussed low calorie, low carb based diet program, portion control, and increasing exercise.          Thrombocytosis    Current Assessment & Plan     Normal x 2;  CBC done 6-6-2024, read by me, reviewed with pt. CBC was normal          Other Visit Diagnoses       Type 2 diabetes mellitus without complication, with long-term current use of insulin        Relevant Medications    Accu-Chek FastClix Lancets misc

## 2024-06-13 NOTE — ASSESSMENT & PLAN NOTE
A1c done 6-6-2024, read by me, reviewed with pt. A1c was 7.9 up from 7.5  Worsening. Patient tolerated Insulin well without side effects. I feel the benefits of the medication outweigh the risks.  Patient will get DexCom and return in 1 month  Encouraged to watch sugar intake, exercise more and lose weight.   Compliant with medication.   Not monitoring sugar at home.   Follow up in 1 months  Care management needs are self-addressed.  Self-management abilities addressed and patient is capable of managing his own disease.

## 2024-06-13 NOTE — ASSESSMENT & PLAN NOTE
Patient's (Body mass index is 40.92 kg/m².) indicates that they are overweight with health conditions that include hypertension, diabetes mellitus, and dyslipidemias . Weight is unchanged. BMI is is above average; BMI management plan is completed. We discussed low calorie, low carb based diet program, portion control, and increasing exercise.

## 2024-06-17 ENCOUNTER — TELEPHONE (OUTPATIENT)
Dept: FAMILY MEDICINE CLINIC | Facility: CLINIC | Age: 64
End: 2024-06-17
Payer: MEDICARE

## 2024-06-17 NOTE — TELEPHONE ENCOUNTER
Caller: Thor Crouch    Relationship: Self    Best call back number: [phone]    What was the call regarding: PATIENT STATES THAT HE NEEDS TO HAVE A DEXCOM DONE. PATIENT RECEIVED CALL FROM [facility] TODAY AND THEY ADVISED THAT MEDICARE NEEDED QUESTIONS ANSWERED. THEY SENT OVER PAPERWORK TO THE OFFICE. THEY DO NOT KNOW THAT PATIENT TAKES INSULIN BECAUSE HE PAYS FOR THIS OUT OF POCKET. PLEASE ADVISE.

## 2024-06-25 ENCOUNTER — TELEPHONE (OUTPATIENT)
Dept: FAMILY MEDICINE CLINIC | Facility: CLINIC | Age: 64
End: 2024-06-25
Payer: MEDICARE

## 2024-06-25 NOTE — TELEPHONE ENCOUNTER
Patient called wanting to know what the update is on the dexcom he has to have done. He said it's been over a week and he's not heard anything.

## 2024-07-22 ENCOUNTER — TELEPHONE (OUTPATIENT)
Dept: FAMILY MEDICINE CLINIC | Facility: CLINIC | Age: 64
End: 2024-07-22
Payer: MEDICARE

## 2024-07-22 NOTE — TELEPHONE ENCOUNTER
Caller: Thor Crouch    Relationship: Self    Best call back number: [phone]     What was the call regarding: PATIENT IS REQUESTING TO SPEAK WITH JJ REGARDING WHEN HE NEEDS TO SEE DR. STARR AGAIN AND IF HE NEEDS TO HAVE BLOOD WORK DONE    PLEASE ADVISE

## 2024-07-23 ENCOUNTER — TELEPHONE (OUTPATIENT)
Dept: FAMILY MEDICINE CLINIC | Facility: CLINIC | Age: 64
End: 2024-07-23
Payer: MEDICARE

## 2024-07-23 DIAGNOSIS — E11.3293 TYPE 2 DIABETES MELLITUS WITH BOTH EYES AFFECTED BY MILD NONPROLIFERATIVE RETINOPATHY WITHOUT MACULAR EDEMA, WITHOUT LONG-TERM CURRENT USE OF INSULIN: Primary | ICD-10-CM

## 2024-07-23 DIAGNOSIS — E78.2 MIXED HYPERLIPIDEMIA: ICD-10-CM

## 2024-07-24 DIAGNOSIS — D75.839 THROMBOCYTOSIS: ICD-10-CM

## 2024-07-24 DIAGNOSIS — E78.2 MIXED HYPERLIPIDEMIA: ICD-10-CM

## 2024-07-24 DIAGNOSIS — E11.3293 TYPE 2 DIABETES MELLITUS WITH BOTH EYES AFFECTED BY MILD NONPROLIFERATIVE RETINOPATHY WITHOUT MACULAR EDEMA, WITHOUT LONG-TERM CURRENT USE OF INSULIN: Primary | ICD-10-CM

## 2024-08-30 LAB
BASOPHILS # BLD AUTO: 0.1 X10E3/UL (ref 0–0.2)
BASOPHILS NFR BLD AUTO: 1 %
EOSINOPHIL # BLD AUTO: 0.3 X10E3/UL (ref 0–0.4)
EOSINOPHIL NFR BLD AUTO: 4 %
ERYTHROCYTE [DISTWIDTH] IN BLOOD BY AUTOMATED COUNT: 13.6 % (ref 11.6–15.4)
HCT VFR BLD AUTO: 48.5 % (ref 37.5–51)
HGB BLD-MCNC: 15.1 G/DL (ref 13–17.7)
IMM GRANULOCYTES # BLD AUTO: 0 X10E3/UL (ref 0–0.1)
IMM GRANULOCYTES NFR BLD AUTO: 0 %
LYMPHOCYTES # BLD AUTO: 1.6 X10E3/UL (ref 0.7–3.1)
LYMPHOCYTES NFR BLD AUTO: 20 %
MCH RBC QN AUTO: 28.3 PG (ref 26.6–33)
MCHC RBC AUTO-ENTMCNC: 31.1 G/DL (ref 31.5–35.7)
MCV RBC AUTO: 91 FL (ref 79–97)
MONOCYTES # BLD AUTO: 0.8 X10E3/UL (ref 0.1–0.9)
MONOCYTES NFR BLD AUTO: 10 %
NEUTROPHILS # BLD AUTO: 5.3 X10E3/UL (ref 1.4–7)
NEUTROPHILS NFR BLD AUTO: 65 %
PLATELET # BLD AUTO: 381 X10E3/UL (ref 150–450)
RBC # BLD AUTO: 5.34 X10E6/UL (ref 4.14–5.8)
WBC # BLD AUTO: 8.2 X10E3/UL (ref 3.4–10.8)

## 2024-09-11 ENCOUNTER — OFFICE VISIT (OUTPATIENT)
Dept: FAMILY MEDICINE CLINIC | Facility: CLINIC | Age: 64
End: 2024-09-11
Payer: MEDICARE

## 2024-09-11 VITALS
SYSTOLIC BLOOD PRESSURE: 142 MMHG | HEIGHT: 69 IN | WEIGHT: 289.4 LBS | RESPIRATION RATE: 18 BRPM | HEART RATE: 80 BPM | OXYGEN SATURATION: 96 % | BODY MASS INDEX: 42.86 KG/M2 | DIASTOLIC BLOOD PRESSURE: 80 MMHG | TEMPERATURE: 97.3 F

## 2024-09-11 DIAGNOSIS — E66.3 OVERWEIGHT: ICD-10-CM

## 2024-09-11 DIAGNOSIS — Z23 NEED FOR IMMUNIZATION AGAINST INFLUENZA: ICD-10-CM

## 2024-09-11 DIAGNOSIS — D75.839 THROMBOCYTOSIS: ICD-10-CM

## 2024-09-11 DIAGNOSIS — E11.9 TYPE 2 DIABETES MELLITUS WITHOUT COMPLICATION, WITH LONG-TERM CURRENT USE OF INSULIN: ICD-10-CM

## 2024-09-11 DIAGNOSIS — I10 HYPERTENSION, BENIGN: ICD-10-CM

## 2024-09-11 DIAGNOSIS — E11.3293 TYPE 2 DIABETES MELLITUS WITH BOTH EYES AFFECTED BY MILD NONPROLIFERATIVE RETINOPATHY WITHOUT MACULAR EDEMA, WITHOUT LONG-TERM CURRENT USE OF INSULIN: Primary | ICD-10-CM

## 2024-09-11 DIAGNOSIS — Z79.4 TYPE 2 DIABETES MELLITUS WITHOUT COMPLICATION, WITH LONG-TERM CURRENT USE OF INSULIN: ICD-10-CM

## 2024-09-11 DIAGNOSIS — E78.2 MIXED HYPERLIPIDEMIA: ICD-10-CM

## 2024-09-11 DIAGNOSIS — E87.5 HYPERKALEMIA: ICD-10-CM

## 2024-09-11 PROCEDURE — 3044F HG A1C LEVEL LT 7.0%: CPT | Performed by: FAMILY MEDICINE

## 2024-09-11 PROCEDURE — 3079F DIAST BP 80-89 MM HG: CPT | Performed by: FAMILY MEDICINE

## 2024-09-11 PROCEDURE — 3077F SYST BP >= 140 MM HG: CPT | Performed by: FAMILY MEDICINE

## 2024-09-11 PROCEDURE — 99214 OFFICE O/P EST MOD 30 MIN: CPT | Performed by: FAMILY MEDICINE

## 2024-09-11 PROCEDURE — 1126F AMNT PAIN NOTED NONE PRSNT: CPT | Performed by: FAMILY MEDICINE

## 2024-09-11 PROCEDURE — 90662 IIV NO PRSV INCREASED AG IM: CPT | Performed by: FAMILY MEDICINE

## 2024-09-11 PROCEDURE — G0008 ADMIN INFLUENZA VIRUS VAC: HCPCS | Performed by: FAMILY MEDICINE

## 2024-09-11 RX ORDER — LOVASTATIN 40 MG
40 TABLET ORAL EVERY EVENING
Start: 2024-09-11

## 2024-09-11 RX ORDER — HUMAN INSULIN 100 [IU]/ML
30 INJECTION, SOLUTION SUBCUTANEOUS DAILY
Qty: 4 EACH | Refills: 3
Start: 2024-09-11

## 2024-09-11 RX ORDER — AMLODIPINE BESYLATE 10 MG/1
10 TABLET ORAL DAILY
Start: 2024-09-11

## 2024-09-11 RX ORDER — LISINOPRIL 20 MG/1
20 TABLET ORAL 2 TIMES DAILY
Start: 2024-09-11

## 2024-09-11 RX ORDER — HUMAN INSULIN 100 [IU]/ML
INJECTION, SUSPENSION SUBCUTANEOUS
Qty: 4 EACH | Refills: 3
Start: 2024-09-11

## 2024-09-11 RX ORDER — METOPROLOL SUCCINATE 50 MG/1
50 TABLET, EXTENDED RELEASE ORAL DAILY
Start: 2024-09-11

## 2024-12-02 DIAGNOSIS — F43.9 SITUATIONAL STRESS: ICD-10-CM

## 2024-12-02 RX ORDER — CITALOPRAM HYDROBROMIDE 10 MG/1
10 TABLET ORAL DAILY
Qty: 90 TABLET | Refills: 0 | Status: SHIPPED | OUTPATIENT
Start: 2024-12-02

## 2024-12-03 LAB
ALBUMIN SERPL-MCNC: 4.1 G/DL (ref 3.9–4.9)
ALP SERPL-CCNC: 96 IU/L (ref 44–121)
ALT SERPL-CCNC: 30 IU/L (ref 0–44)
AST SERPL-CCNC: 19 IU/L (ref 0–40)
BILIRUB SERPL-MCNC: 0.3 MG/DL (ref 0–1.2)
BUN SERPL-MCNC: 25 MG/DL (ref 8–27)
BUN/CREAT SERPL: 18 (ref 10–24)
CALCIUM SERPL-MCNC: 9.3 MG/DL (ref 8.6–10.2)
CHLORIDE SERPL-SCNC: 104 MMOL/L (ref 96–106)
CHOLEST SERPL-MCNC: 152 MG/DL (ref 100–199)
CHOLEST/HDLC SERPL: 4.1 RATIO (ref 0–5)
CO2 SERPL-SCNC: 22 MMOL/L (ref 20–29)
CREAT SERPL-MCNC: 1.37 MG/DL (ref 0.76–1.27)
EGFRCR SERPLBLD CKD-EPI 2021: 57 ML/MIN/1.73
GLOBULIN SER CALC-MCNC: 2.8 G/DL (ref 1.5–4.5)
GLUCOSE SERPL-MCNC: 116 MG/DL (ref 70–99)
HBA1C MFR BLD: 7.9 % (ref 4.8–5.6)
HDLC SERPL-MCNC: 37 MG/DL
LDLC SERPL CALC-MCNC: 92 MG/DL (ref 0–99)
POTASSIUM SERPL-SCNC: 5.2 MMOL/L (ref 3.5–5.2)
PROT SERPL-MCNC: 6.9 G/DL (ref 6–8.5)
SODIUM SERPL-SCNC: 141 MMOL/L (ref 134–144)
TRIGL SERPL-MCNC: 127 MG/DL (ref 0–149)
VLDLC SERPL CALC-MCNC: 23 MG/DL (ref 5–40)

## 2024-12-11 ENCOUNTER — OFFICE VISIT (OUTPATIENT)
Dept: FAMILY MEDICINE CLINIC | Facility: CLINIC | Age: 64
End: 2024-12-11
Payer: MEDICARE

## 2024-12-11 VITALS
RESPIRATION RATE: 18 BRPM | WEIGHT: 292 LBS | HEIGHT: 69 IN | DIASTOLIC BLOOD PRESSURE: 90 MMHG | HEART RATE: 84 BPM | TEMPERATURE: 96.9 F | SYSTOLIC BLOOD PRESSURE: 132 MMHG | BODY MASS INDEX: 43.25 KG/M2 | OXYGEN SATURATION: 94 %

## 2024-12-11 DIAGNOSIS — I10 HYPERTENSION, BENIGN: ICD-10-CM

## 2024-12-11 DIAGNOSIS — E11.3293 TYPE 2 DIABETES MELLITUS WITH BOTH EYES AFFECTED BY MILD NONPROLIFERATIVE RETINOPATHY WITHOUT MACULAR EDEMA, WITHOUT LONG-TERM CURRENT USE OF INSULIN: ICD-10-CM

## 2024-12-11 DIAGNOSIS — E78.2 MIXED HYPERLIPIDEMIA: Primary | ICD-10-CM

## 2024-12-11 DIAGNOSIS — E66.3 OVERWEIGHT: ICD-10-CM

## 2024-12-11 DIAGNOSIS — N18.31 STAGE 3A CHRONIC KIDNEY DISEASE: ICD-10-CM

## 2024-12-11 DIAGNOSIS — E87.5 HYPERKALEMIA: ICD-10-CM

## 2024-12-11 RX ORDER — SYRINGE AND NEEDLE,INSULIN,1ML 31GX15/64"
1 SYRINGE, EMPTY DISPOSABLE MISCELLANEOUS 2 TIMES DAILY
Qty: 100 EACH | Refills: 5 | Status: SHIPPED | OUTPATIENT
Start: 2024-12-11

## 2024-12-11 RX ORDER — LOVASTATIN 40 MG/1
40 TABLET ORAL EVERY EVENING
Qty: 90 TABLET | Refills: 3 | Status: SHIPPED | OUTPATIENT
Start: 2024-12-11

## 2024-12-11 RX ORDER — HUMAN INSULIN 100 [IU]/ML
50 INJECTION, SOLUTION SUBCUTANEOUS DAILY
Qty: 4 EACH | Refills: 5 | Status: SHIPPED | OUTPATIENT
Start: 2024-12-11

## 2024-12-11 RX ORDER — AMLODIPINE BESYLATE 10 MG/1
10 TABLET ORAL DAILY
Start: 2024-12-11

## 2024-12-11 RX ORDER — METOPROLOL SUCCINATE 50 MG/1
50 TABLET, EXTENDED RELEASE ORAL DAILY
Start: 2024-12-11

## 2024-12-11 RX ORDER — HUMAN INSULIN 100 [IU]/ML
INJECTION, SUSPENSION SUBCUTANEOUS
Qty: 4 EACH | Refills: 5 | Status: SHIPPED | OUTPATIENT
Start: 2024-12-11

## 2024-12-11 RX ORDER — LISINOPRIL 20 MG/1
20 TABLET ORAL 2 TIMES DAILY
Qty: 180 TABLET | Refills: 3 | Status: SHIPPED | OUTPATIENT
Start: 2024-12-11

## 2024-12-11 NOTE — ASSESSMENT & PLAN NOTE
A1c done 12-2-2024, read by me, reviewed with pt.  A1c was 7.9 up from 6.9  Worsening.  Advised to increase Novolin R to max of 50 units.   Discussed Jardiance 10 mg daily, pt. Will start Jardiance if affordable.  Encouraged to watch sugar intake, exercise more and lose weight.   Compliant with medication.  Patient tolerated Novolin N and Novolin R well without side effects. I feel the benefits of the medication outweigh the risks.   Monitoring blood sugars at home wears a Dexcom, advised to start checking blood sugars and bring in blood sugar diary to let us know when it is low and when it is getting too high..  Dexcom is now set to alarm of 70 for low but he is not symptomatic till 60.  He will reset this for 65  Follow up in 2-3 months  Care management needs are self-addressed.  Self-management abilities addressed and patient is capable of managing his own disease.

## 2024-12-11 NOTE — ASSESSMENT & PLAN NOTE
New dx.  CMP done 12-2-2024, read by me, reviewed with pt.  Creatinine was 1.37 up from 1.15, EGFR was 57 down from 70.  Will repeat with next labs.  Discussed Jardiance 10 mg daily, pt. Will start Jardiance if affordable.

## 2024-12-11 NOTE — PROGRESS NOTES
Subjective   Thor Crouch is a 64 y.o. male.   Chief Complaint   Patient presents with    Hyperlipidemia    Hypertension    Diabetes     Hyperlipidemia  This is a chronic problem. The current episode started more than 1 year ago. The problem is controlled. Exacerbating diseases include chronic renal disease, diabetes and obesity. Factors aggravating his hyperlipidemia include fatty foods. Pertinent negatives include no chest pain, myalgias or shortness of breath. Current antihyperlipidemic treatment includes statins. The current treatment provides moderate improvement of lipids. Risk factors for coronary artery disease include dyslipidemia, diabetes mellitus, hypertension and obesity.   Hypertension  This is a chronic problem. The current episode started more than 1 year ago. The problem is controlled. Pertinent negatives include no chest pain, palpitations or shortness of breath. Identifiable causes of hypertension include chronic renal disease.   Diabetes  He presents for his follow-up diabetic visit. He has type 2 diabetes mellitus. His disease course has been worsening. Pertinent negatives for diabetes include no chest pain, no fatigue, no polyphagia, no polyuria and no weight loss.        The following portions of the patient's history were reviewed and updated as appropriate: allergies, current medications, past family history, past medical history, past social history, past surgical history, and problem list.    Past Medical History:   Diagnosis Date    Cataract of both eyes     Other cataract of both eyes; Impression: Stable    Diabetic nephropathy associated with type 2 diabetes mellitus     Impression: Protein normal 06/18, will continue to repeat.    Epiretinal membrane, left     Impression: Followed with Dr. Murcia    History of prostate cancer     Impression: Patient has a follow up appt with Dr. Taylor NP today in [place], discussed with patient about transferring to Dr. Li or Michelle, so he can  see them in the [place] office.    Hypertension, benign     Impression: Good control; Encouraged to watch salt, exercise more and lose weight    Left atrial enlargement     Impression: Stable, will follow conservatively    Malignant neoplasm of prostate     Impression: Doing well. Followed with Dr. Washington    Microcytic anemia     Impression: cbc with next labs    Overweight     >25    Seasonal allergic rhinitis due to pollen     Impression: Doing well    Situational stress     Impression: Stable    Skin lesion     Unspecified Skin Lesion; Impression: Will obtain notes from [facility].    Stroke with cerebral ischemia     Impression: Doing well at this time. Will obtain records from Dr. Seipel, regarding medication Plavix.    Type 2 diabetes mellitus with both eyes affected by mild nonproliferative retinopathy without macular edema, without long-term current use of insulin     Impression: Uncertain control; Advised patient to start monitoring blood sugar at home since taking OTC cough/cold medications.    Vitamin B12 deficiency     Impression: Patient was supposed to recieve a B12 injection, however patient left before it was done. Patient was called to come back to get this injection.       Past Surgical History:   Procedure Laterality Date    PROSTATE SURGERY          Family History   Problem Relation Age of Onset    Heart disease Mother         ischemic    Arthritis Mother     Goiter Father     Heart disease Father         ischemic    Heart attack Father     Diabetes Sister         diabetes mellitus    Diabetes Brother         diabetes mellitus    Diabetes Maternal Uncle         diabetes mellitus    Diabetes Paternal Grandmother         diabetes mellitus    Stroke Paternal Grandfather     Kidney failure Other         Uncle    Arthritis Other         grandmother        Social History     Socioeconomic History    Marital status:    Tobacco Use    Smoking status: Never     Passive exposure: Never  "   Smokeless tobacco: Never   Vaping Use    Vaping status: Never Used   Substance and Sexual Activity    Alcohol use: No    Drug use: No    Sexual activity: Defer       Outpatient Medications Prior to Visit   Medication Sig Dispense Refill    Accu-Chek FastClix Lancets misc CHECK BLOOD SUGAR THREE TIMES DAILY 102 each 5    citalopram (CeleXA) 10 MG tablet TAKE 1 TABLET BY MOUTH DAILY 90 tablet 0    clopidogrel (PLAVIX) 75 MG tablet Take 1 tablet by mouth Daily. 90 tablet 04    Continuous Glucose  (Dexcom G7 ) device Use 1 Device Every 10 (Ten) Days. 12 each 12    Continuous Glucose Sensor (Dexcom G7 Sensor) misc Use 1 Device Every 10 (Ten) Days. 12 each 3    fluticasone (Flonase Allergy Relief) 50 MCG/ACT nasal spray 2 sprays by Each Nare route Daily. Shake well before using. 16 g 3    glucose blood (Accu-Chek Guide) test strip USE THREE TIMES DAILY 300 each 3    glucose blood (Accu-Chek Guide) test strip USE THREE TIMES DAILY 300 each 2    loratadine (Claritin) 10 MG tablet Take 1 tablet by mouth Daily. 90 tablet 0    ReliOn Insulin Syringe 31G X 15/64\" 1 ML misc Inject 80 Units under the skin into the appropriate area as directed 3 (Three) Times a Day. 100 each 12    amLODIPine (NORVASC) 10 MG tablet Take 1 tablet by mouth Daily.      insulin NPH (NovoLIN N ReliOn) 100 UNIT/ML injection Inject 90 units under the skin at hs 4 each 3    insulin regular (NovoLIN R) 100 UNIT/ML injection Inject 30 Units under the skin into the appropriate area as directed Daily. Take within 30 minutes of beginning largest  meal. 4 each 3    Insulin Syringe-Needle U-100 (ReliOn Insulin Syringe) 31G X 15/64\" 1 ML misc Inject 1 syringe under the skin into the appropriate area as directed 2 (Two) Times a Day. 90 each 0    lisinopril (PRINIVIL,ZESTRIL) 20 MG tablet Take 1 tablet by mouth 2 (Two) Times a Day.      lovastatin (MEVACOR) 40 MG tablet Take 1 tablet by mouth Every Evening.      metoprolol succinate XL (TOPROL-XL) " "50 MG 24 hr tablet Take 1 tablet by mouth Daily.      baclofen (LIORESAL) 10 MG tablet Take 1 tablet by mouth 3 (Three) Times a Day. (Patient not taking: Reported on 12/11/2024) 90 tablet 1     Facility-Administered Medications Prior to Visit   Medication Dose Route Frequency Provider Last Rate Last Admin    cyanocobalamin injection 1,000 mcg  1,000 mcg Intramuscular Q28 Days Hayes Sharp MD   1,000 mcg at 07/22/20 1727    cyanocobalamin injection 1,000 mcg  1,000 mcg Intramuscular Q28 Days Hayes Sharp MD   1,000 mcg at 11/02/20 1725        Review of Systems   Constitutional:  Negative for fatigue, unexpected weight gain and unexpected weight loss.   Eyes:  Negative for visual disturbance.   Respiratory:  Negative for shortness of breath.    Cardiovascular:  Negative for chest pain, palpitations and leg swelling.   Gastrointestinal:  Negative for nausea.   Endocrine: Negative for polyphagia and polyuria.   Genitourinary:  Negative for frequency.   Musculoskeletal:  Negative for myalgias.   Skin:  Negative for dry skin and skin lesions.   Neurological:  Negative for syncope, numbness and headache.       Objective   Visit Vitals  /90 (BP Location: Left arm, Patient Position: Sitting, Cuff Size: Large Adult)   Pulse 84   Temp 96.9 °F (36.1 °C) (Temporal)   Resp 18   Ht 175.3 cm (69\")   Wt 132 kg (292 lb)   SpO2 94%   BMI 43.12 kg/m²     Physical Exam  Vitals and nursing note reviewed.   Constitutional:       Appearance: He is well-developed.   HENT:      Head: Normocephalic.   Neck:      Thyroid: No thyromegaly.      Vascular: No carotid bruit.      Trachea: Trachea normal.   Cardiovascular:      Rate and Rhythm: Normal rate and regular rhythm.      Heart sounds: No murmur heard.     No friction rub. No gallop.   Pulmonary:      Effort: Pulmonary effort is normal. No respiratory distress.      Breath sounds: Normal breath sounds. No wheezing.   Chest:      Chest wall: No tenderness.   Musculoskeletal: "      Cervical back: Neck supple.   Skin:     General: Skin is dry.      Findings: No rash.      Nails: There is no clubbing.   Neurological:      Mental Status: He is alert and oriented to person, place, and time.   Psychiatric:         Behavior: Behavior is cooperative.         Assessment & Plan   Problem List Items Addressed This Visit          Medium    Hyperlipidemia - Primary    Overview     LDL goal less than 70         Current Assessment & Plan     Lipid and CMP done 12-2-2024, read by me, reviewed with pt.  Trig. 127 down from 147, Tot. Chol. 152 down from 158, HDL 37 down from 39, LDL 92 down from 93  Improved.   Encouraged to watch fatty intake, exercise more, and lose weight.   Compliant with medication.  Patient tolerated Lovastatin and Fish Oil well without side effects. I feel the benefits of the medication outweigh the risks.    Is not getting adequate diet and exercise  Goals developed at last visit were not met close to goal.  Follow up in 2-3  months  Care management needs are self-addressed.  Self-management abilities addressed and patient is capable of managing his own disease.           Relevant Medications    lovastatin (MEVACOR) 40 MG tablet    Hypertension, benign    Current Assessment & Plan     Borderline poor control.  Patient tolerated Norvasc, Lisinopril and Metoprolol well without side effects. I feel the benefits of the medication outweigh the risks.   Encouraged to watch salt, exercise more and lose weight.           Relevant Medications    amLODIPine (NORVASC) 10 MG tablet    lisinopril (PRINIVIL,ZESTRIL) 20 MG tablet    metoprolol succinate XL (TOPROL-XL) 50 MG 24 hr tablet    Type 2 diabetes mellitus with both eyes affected by mild nonproliferative retinopathy without macular edema, without long-term current use of insulin    Overview     Novolin N  50 units at HS and 40 units in the AM    Novolin R 30-50 units with meals.    Off Trulicity due to cost         Current Assessment &  "Plan     A1c done 12-2-2024, read by me, reviewed with pt.  A1c was 7.9 up from 6.9  Worsening.  Advised to increase Novolin R to max of 50 units.   Discussed Jardiance 10 mg daily, pt. Will start Jardiance if affordable.  Encouraged to watch sugar intake, exercise more and lose weight.   Compliant with medication.  Patient tolerated Novolin N and Novolin R well without side effects. I feel the benefits of the medication outweigh the risks.   Monitoring blood sugars at home wears a Dexcom, advised to start checking blood sugars and bring in blood sugar diary to let us know when it is low and when it is getting too high..  Dexcom is now set to alarm of 70 for low but he is not symptomatic till 60.  He will reset this for 65  Follow up in 2-3 months  Care management needs are self-addressed.  Self-management abilities addressed and patient is capable of managing his own disease.          Relevant Medications    insulin NPH (NovoLIN N ReliOn) 100 UNIT/ML injection    insulin regular (NovoLIN R) 100 UNIT/ML injection    Insulin Syringe-Needle U-100 (ReliOn Insulin Syringe) 31G X 15/64\" 1 ML misc    empagliflozin (Jardiance) 10 MG tablet tablet       Unprioritized    Hyperkalemia    Overview     New dx.  CMP done 1-, read by me, reviewed with pt.  Potassium was 5.4 up from 5.2.  Advised to avoid Potassium rich foods         Current Assessment & Plan     Resolved x 1.  CMP done 12-2-2024, read by me, reviewed with pt.  Potassium was 5.2 down from 5.4.  Will repeat with next labs.         Overweight    Current Assessment & Plan     Patient's (Body mass index is 43.12 kg/m².) indicates that they are overweight with health conditions that include hypertension, diabetes mellitus, and dyslipidemias . Weight is worsening. BMI is above average; BMI management plan is completed. We discussed portion control and increasing exercise.          Stage 3a chronic kidney disease    Current Assessment & Plan     New dx.  CMP done " 12-2-2024, read by me, reviewed with pt.  Creatinine was 1.37 up from 1.15, EGFR was 57 down from 70.  Will repeat with next labs.  Discussed Jardiance 10 mg daily, pt. Will start Jardiance if affordable.         Relevant Medications    empagliflozin (Jardiance) 10 MG tablet tablet

## 2024-12-11 NOTE — ASSESSMENT & PLAN NOTE
Borderline poor control.  Patient tolerated Norvasc, Lisinopril and Metoprolol well without side effects. I feel the benefits of the medication outweigh the risks.   Encouraged to watch salt, exercise more and lose weight.

## 2024-12-11 NOTE — ASSESSMENT & PLAN NOTE
Lipid and CMP done 12-2-2024, read by me, reviewed with pt.  Trig. 127 down from 147, Tot. Chol. 152 down from 158, HDL 37 down from 39, LDL 92 down from 93  Improved.   Encouraged to watch fatty intake, exercise more, and lose weight.   Compliant with medication.  Patient tolerated Lovastatin and Fish Oil well without side effects. I feel the benefits of the medication outweigh the risks.    Is not getting adequate diet and exercise  Goals developed at last visit were not met close to goal.  Follow up in 2-3  months  Care management needs are self-addressed.  Self-management abilities addressed and patient is capable of managing his own disease.

## 2024-12-11 NOTE — ASSESSMENT & PLAN NOTE
Patient's (Body mass index is 43.12 kg/m².) indicates that they are overweight with health conditions that include hypertension, diabetes mellitus, and dyslipidemias . Weight is worsening. BMI is above average; BMI management plan is completed. We discussed portion control and increasing exercise.

## 2024-12-11 NOTE — ASSESSMENT & PLAN NOTE
Resolved x 1.  CMP done 12-2-2024, read by me, reviewed with pt.  Potassium was 5.2 down from 5.4.  Will repeat with next labs.

## 2024-12-27 DIAGNOSIS — M50.90 CERVICAL DISC DISEASE: ICD-10-CM

## 2024-12-30 RX ORDER — BACLOFEN 10 MG/1
10 TABLET ORAL 3 TIMES DAILY
Qty: 90 TABLET | Refills: 0 | Status: SHIPPED | OUTPATIENT
Start: 2024-12-30

## 2025-01-15 DIAGNOSIS — E78.2 MIXED HYPERLIPIDEMIA: ICD-10-CM

## 2025-01-15 RX ORDER — LOVASTATIN 40 MG/1
40 TABLET ORAL EVERY EVENING
Qty: 90 TABLET | Refills: 0 | Status: SHIPPED | OUTPATIENT
Start: 2025-01-15

## 2025-01-15 NOTE — TELEPHONE ENCOUNTER
Caller: Willa Thor    Relationship: Self    Best call back number: [phone]    Requested Prescriptions:   Requested Prescriptions     Pending Prescriptions Disp Refills    lovastatin (MEVACOR) 40 MG tablet 90 tablet 3     Sig: Take 1 tablet by mouth Every Evening.        Pharmacy where request should be sent: [id number] - [place]  [address] [facility] [address] AT [facility] [address]  & [facility] - [phone]  - [phone] FX     Last office visit with prescribing clinician: 12/11/2024   Last telemedicine visit with prescribing clinician: Visit date not found   Next office visit with prescribing clinician: 1/22/2025     Additional details provided by patient:       Courtney Joel, PCT   01/15/25 12:20 EST

## 2025-01-22 ENCOUNTER — OFFICE VISIT (OUTPATIENT)
Dept: FAMILY MEDICINE CLINIC | Facility: CLINIC | Age: 65
End: 2025-01-22
Payer: MEDICARE

## 2025-01-22 DIAGNOSIS — I10 HYPERTENSION, BENIGN: ICD-10-CM

## 2025-01-22 DIAGNOSIS — E11.3293 TYPE 2 DIABETES MELLITUS WITH BOTH EYES AFFECTED BY MILD NONPROLIFERATIVE RETINOPATHY WITHOUT MACULAR EDEMA, WITHOUT LONG-TERM CURRENT USE OF INSULIN: Primary | ICD-10-CM

## 2025-01-22 DIAGNOSIS — E66.3 OVERWEIGHT: ICD-10-CM

## 2025-01-22 PROCEDURE — 3078F DIAST BP <80 MM HG: CPT | Performed by: FAMILY MEDICINE

## 2025-01-22 PROCEDURE — 3075F SYST BP GE 130 - 139MM HG: CPT | Performed by: FAMILY MEDICINE

## 2025-01-22 PROCEDURE — 1159F MED LIST DOCD IN RCRD: CPT | Performed by: FAMILY MEDICINE

## 2025-01-22 PROCEDURE — 1126F AMNT PAIN NOTED NONE PRSNT: CPT | Performed by: FAMILY MEDICINE

## 2025-01-22 PROCEDURE — 99214 OFFICE O/P EST MOD 30 MIN: CPT | Performed by: FAMILY MEDICINE

## 2025-01-22 PROCEDURE — G2211 COMPLEX E/M VISIT ADD ON: HCPCS | Performed by: FAMILY MEDICINE

## 2025-01-22 PROCEDURE — 1160F RVW MEDS BY RX/DR IN RCRD: CPT | Performed by: FAMILY MEDICINE

## 2025-01-22 RX ORDER — HUMAN INSULIN 100 [IU]/ML
50 INJECTION, SOLUTION SUBCUTANEOUS
Qty: 6 EACH | Refills: 5 | Status: SHIPPED | OUTPATIENT
Start: 2025-01-22

## 2025-01-22 RX ORDER — HUMAN INSULIN 100 [IU]/ML
INJECTION, SUSPENSION SUBCUTANEOUS
Qty: 6 EACH | Refills: 5 | Status: SHIPPED | OUTPATIENT
Start: 2025-01-22

## 2025-01-22 NOTE — ASSESSMENT & PLAN NOTE
Reviewed blood sugar readings from home-patient has a dexcom and also checks with glucometer.  He is concerned about the discrepancy in numbers.  I discussed possibly switching to a Dexcom 6.  Please home blood sugars are reviewed and scanned to the chart.  He wants to hold this at the present  After reviewing the blood sugars.  He is having some hypoglycemia at night.  Advised him that he should adjust his regular down and supper to avoid this.  I advised to regulate Insulin at supper depending the size of the meal, what blood sugar is prior to eating, and activity level.

## 2025-01-22 NOTE — PROGRESS NOTES
Subjective   Thor Crouch is a 64 y.o. male.   Chief Complaint   Patient presents with    Diabetes   Extra visit for uncontrolled diabetes and elevated blood pressure  Diabetes  He presents for his follow-up diabetic visit. He has type 2 (Uncontrolled) diabetes mellitus. His disease course has been stable. Pertinent negatives for diabetes include no polyphagia and no polyuria.   Hypertension  This is a chronic problem. The current episode started more than 1 year ago. The problem has been improved since onset. The problem is controlled.        The following portions of the patient's history were reviewed and updated as appropriate: allergies, current medications, past family history, past medical history, past social history, past surgical history, and problem listm.    Past Medical History:   Diagnosis Date    Cataract of both eyes     Other cataract of both eyes; Impression: Stable    Diabetic nephropathy associated with type 2 diabetes mellitus     Impression: Protein normal 06/18, will continue to repeat.    Epiretinal membrane, left     Impression: Followed with Dr. Murcia    History of prostate cancer     Impression: Patient has a follow up appt with Dr. Taylor NP today in [place], discussed with patient about transferring to Dr. Li or Michelle, so he can see them in the [place] office.    Hypertension, benign     Impression: Good control; Encouraged to watch salt, exercise more and lose weight    Left atrial enlargement     Impression: Stable, will follow conservatively    Malignant neoplasm of prostate     Impression: Doing well. Followed with Dr. Washington    Microcytic anemia     Impression: cbc with next labs    Overweight     >25    Seasonal allergic rhinitis due to pollen     Impression: Doing well    Situational stress     Impression: Stable    Skin lesion     Unspecified Skin Lesion; Impression: Will obtain notes from [facility].    Stroke with cerebral ischemia     Impression: Doing  well at this time. Will obtain records from Dr. Seipel, regarding medication Plavix.    Type 2 diabetes mellitus with both eyes affected by mild nonproliferative retinopathy without macular edema, without long-term current use of insulin     Impression: Uncertain control; Advised patient to start monitoring blood sugar at home since taking OTC cough/cold medications.    Vitamin B12 deficiency     Impression: Patient was supposed to recieve a B12 injection, however patient left before it was done. Patient was called to come back to get this injection.       Past Surgical History:   Procedure Laterality Date    PROSTATE SURGERY          Family History   Problem Relation Age of Onset    Heart disease Mother         ischemic    Arthritis Mother     Goiter Father     Heart disease Father         ischemic    Heart attack Father     Diabetes Sister         diabetes mellitus    Diabetes Brother         diabetes mellitus    Diabetes Maternal Uncle         diabetes mellitus    Diabetes Paternal Grandmother         diabetes mellitus    Stroke Paternal Grandfather     Kidney failure Other         Uncle    Arthritis Other         grandmother        Social History     Socioeconomic History    Marital status:    Tobacco Use    Smoking status: Never     Passive exposure: Never    Smokeless tobacco: Never   Vaping Use    Vaping status: Never Used   Substance and Sexual Activity    Alcohol use: No    Drug use: No    Sexual activity: Defer       Outpatient Medications Prior to Visit   Medication Sig Dispense Refill    Accu-Chek FastClix Lancets misc CHECK BLOOD SUGAR THREE TIMES DAILY 102 each 5    amLODIPine (NORVASC) 10 MG tablet Take 1 tablet by mouth Daily.      baclofen (LIORESAL) 10 MG tablet TAKE 1 TABLET BY MOUTH THREE TIMES DAILY 90 tablet 0    citalopram (CeleXA) 10 MG tablet TAKE 1 TABLET BY MOUTH DAILY 90 tablet 0    clopidogrel (PLAVIX) 75 MG tablet Take 1 tablet by mouth Daily. 90 tablet 04    Continuous Glucose  " (Dexcom G7 ) device Use 1 Device Every 10 (Ten) Days. 12 each 12    Continuous Glucose Sensor (Dexcom G7 Sensor) misc Use 1 Device Every 10 (Ten) Days. 12 each 3    empagliflozin (Jardiance) 10 MG tablet tablet Take 1 tablet by mouth Daily. 90 tablet 3    fluticasone (Flonase Allergy Relief) 50 MCG/ACT nasal spray 2 sprays by Each Nare route Daily. Shake well before using. 16 g 3    glucose blood (Accu-Chek Guide) test strip USE THREE TIMES DAILY 300 each 3    glucose blood (Accu-Chek Guide) test strip USE THREE TIMES DAILY 300 each 2    Insulin Syringe-Needle U-100 (ReliOn Insulin Syringe) 31G X 15/64\" 1 ML misc Inject 1 syringe under the skin into the appropriate area as directed 2 (Two) Times a Day. 100 each 5    lisinopril (PRINIVIL,ZESTRIL) 20 MG tablet Take 1 tablet by mouth 2 (Two) Times a Day. 180 tablet 3    loratadine (Claritin) 10 MG tablet Take 1 tablet by mouth Daily. 90 tablet 0    lovastatin (MEVACOR) 40 MG tablet Take 1 tablet by mouth Every Evening. 90 tablet 0    metoprolol succinate XL (TOPROL-XL) 50 MG 24 hr tablet Take 1 tablet by mouth Daily.      ReliOn Insulin Syringe 31G X 15/64\" 1 ML misc Inject 80 Units under the skin into the appropriate area as directed 3 (Three) Times a Day. 100 each 12    insulin NPH (NovoLIN N ReliOn) 100 UNIT/ML injection Inject 90 units under the skin at hs 4 each 5    insulin regular (NovoLIN R) 100 UNIT/ML injection Inject 50 Units under the skin into the appropriate area as directed Daily. Take within 30 minutes of beginning meal. 4 each 5     Facility-Administered Medications Prior to Visit   Medication Dose Route Frequency Provider Last Rate Last Admin    cyanocobalamin injection 1,000 mcg  1,000 mcg Intramuscular Q28 Days Hayes Sharp MD   1,000 mcg at 07/22/20 1727    cyanocobalamin injection 1,000 mcg  1,000 mcg Intramuscular Q28 Days Hayes Sharp MD   1,000 mcg at 11/02/20 1725        Review of Systems   Eyes:  Negative for visual " disturbance.   Endocrine: Negative for polyphagia and polyuria.   Genitourinary:  Negative for frequency.   Skin:  Negative for skin lesions.   Neurological:  Negative for syncope and numbness.       Objective   Visit Vitals  /72   Pulse 78   Wt 133 kg (293 lb 6.4 oz)   SpO2 96%   BMI 43.33 kg/m²     Physical Exam  Vitals and nursing note reviewed.   Constitutional:       Appearance: He is well-developed.   HENT:      Head: Normocephalic.   Neck:      Thyroid: No thyromegaly.      Vascular: No carotid bruit.      Trachea: Trachea normal.   Cardiovascular:      Rate and Rhythm: Normal rate and regular rhythm.      Heart sounds: No murmur heard.     No friction rub. No gallop.   Pulmonary:      Effort: Pulmonary effort is normal. No respiratory distress.      Breath sounds: Normal breath sounds. No wheezing.   Chest:      Chest wall: No tenderness.   Musculoskeletal:      Cervical back: Neck supple.   Skin:     General: Skin is dry.      Findings: No rash.      Nails: There is no clubbing.   Neurological:      Mental Status: He is alert and oriented to person, place, and time.   Psychiatric:         Behavior: Behavior is cooperative.         Assessment & Plan   Problem List Items Addressed This Visit          Medium    Hypertension, benign    Current Assessment & Plan     Improving; Encouraged to watch salt, exercise more and lose weight.  Patient tolerated norvasc, lisinopril, metoprolol well without side effects. I feel the benefits of the medication outweigh the risks.           Type 2 diabetes mellitus with both eyes affected by mild nonproliferative retinopathy without macular edema, without long-term current use of insulin - Primary    Overview     Novolin N  50 units at HS and 50 units in the AM  Novolin R 50-50 units with meals.  He is also on Jardiance and tolerates this well.  He has side effects from metformin cannot tolerate this.  Off Trulicity due to cost         Current Assessment & Plan      Reviewed blood sugar readings from home-patient has a dexcom and also checks with glucometer.  He is concerned about the discrepancy in numbers.  I discussed possibly switching to a Dexcom 6.  He wants to hold this at the present  After reviewing the blood sugars.  He is having some hypoglycemia at night.  Advised him that he should adjust his regular down and supper to avoid this.  I advised to regulate Insulin at supper depending the size of the meal, what blood sugar is prior to eating, and activity level.          Relevant Medications    insulin NPH (NovoLIN N ReliOn) 100 UNIT/ML injection    insulin regular (NovoLIN R) 100 UNIT/ML injection       Unprioritized    Overweight    Current Assessment & Plan     Increase activity and decrease caloric intake

## 2025-01-22 NOTE — ASSESSMENT & PLAN NOTE
Improving; Encouraged to watch salt, exercise more and lose weight.  Patient tolerated norvasc, lisinopril, metoprolol well without side effects. I feel the benefits of the medication outweigh the risks.

## 2025-01-25 VITALS
DIASTOLIC BLOOD PRESSURE: 72 MMHG | SYSTOLIC BLOOD PRESSURE: 135 MMHG | HEART RATE: 78 BPM | OXYGEN SATURATION: 96 % | WEIGHT: 293.4 LBS | BODY MASS INDEX: 43.33 KG/M2

## 2025-01-27 DIAGNOSIS — I10 HYPERTENSION, BENIGN: ICD-10-CM

## 2025-01-27 RX ORDER — LISINOPRIL 20 MG/1
20 TABLET ORAL 2 TIMES DAILY
Qty: 180 TABLET | Refills: 0 | Status: SHIPPED | OUTPATIENT
Start: 2025-01-27

## 2025-02-04 NOTE — PROGRESS NOTES
"Chief Complaint  Sleep Apnea    Subjective          Thor Crouch presents to [facility] NEUROLOGY  History of Present Illness  Yearly f/u for CPAP compliance, doing well with pap therapy. He wears a nasal mask and gets supplies online. Patient states he has been having issues w/ replacement machine from recall (seal)     SLEEP TESTING HISTORY:    On NPSG at [facility] , 10/12/2018 patient had Moderate obstructive sleep apnea syndrome with apnea-hypopnea index of 17.2 per sleep hour, minimum SpO2 of 82%     PAP download:  The patient is on CPAP therapy at 9-14 cm/H2O.   Data indicates Excellent compliance. With 99% usage for more than 4 hours with an average usage of 11 hours 10 minutes. AHI down to 2.6 .  Average pressures 10.8.  Average large leak 36sec.     The patient's hypersomnia has resolved       [place] Sleepiness Scale:  Sitting and reading JS [place] Sleepiness: 1 WatchingTV JS [place] Sleepiness: 1  Sitting, inactive, in a public place JS [place] Sleepiness: 0  As a passenger in a car for 1 hour w/o a break  1  Lying down to rest in the afternoon  JS [place] Sleepiness: 1  Sitting and talking to someone  JS [place] Sleepiness: 0  Sitting quietly after a lunch  JS [place] Sleepiness: 0  In a car, while stopped for traffic or a light  JS [place] Sleepiness: 0  Total 4    Review of Systems   Constitutional:  Negative for fatigue.   Respiratory:  Negative for apnea, chest tightness and shortness of breath.    Neurological:  Negative for speech difficulty.   Psychiatric/Behavioral:  Negative for sleep disturbance.    All other systems reviewed and are negative.        Objective   Vital Signs:   /79   Pulse 74   Resp 16   Ht 175.3 cm (69\")   Wt 134 kg (296 lb)   BMI 43.71 kg/m²     Physical Exam  Vitals reviewed.   Constitutional:       Appearance: Normal appearance.   Pulmonary:      Effort: Pulmonary effort is normal.   Neurological:      General: No focal deficit present.      Mental " Status: He is alert and oriented to person, place, and time.   Psychiatric:         Mood and Affect: Mood normal.      Result Review :                 Assessment and Plan    Diagnoses and all orders for this visit:    1. Obstructive sleep apnea (Primary)      Will order new cpap machine   The patient is compliant with and benefiting from PAP therapy.      Follow Up   Return for Follow Up visit 30 to 90 days after obtaining PAP.    Patient was given instructions and counseling regarding his condition or for health maintenance advice. Please see specific information pulled into the AVS if appropriate.       This document has been electronically signed by Joseph Seipel, MD on February 6, 2025 14:19 EST

## 2025-02-06 ENCOUNTER — OFFICE VISIT (OUTPATIENT)
Dept: NEUROLOGY | Facility: CLINIC | Age: 65
End: 2025-02-06
Payer: MEDICARE

## 2025-02-06 ENCOUNTER — OFFICE VISIT (OUTPATIENT)
Dept: FAMILY MEDICINE CLINIC | Facility: CLINIC | Age: 65
End: 2025-02-06
Payer: MEDICARE

## 2025-02-06 ENCOUNTER — HOSPITAL ENCOUNTER (OUTPATIENT)
Dept: GENERAL RADIOLOGY | Facility: HOSPITAL | Age: 65
Discharge: HOME | End: 2025-02-06
Admitting: FAMILY MEDICINE
Payer: MEDICARE

## 2025-02-06 VITALS
HEART RATE: 74 BPM | HEIGHT: 69 IN | SYSTOLIC BLOOD PRESSURE: 138 MMHG | BODY MASS INDEX: 43.84 KG/M2 | WEIGHT: 296 LBS | DIASTOLIC BLOOD PRESSURE: 79 MMHG | RESPIRATION RATE: 16 BRPM

## 2025-02-06 VITALS
SYSTOLIC BLOOD PRESSURE: 138 MMHG | BODY MASS INDEX: 43.9 KG/M2 | HEART RATE: 78 BPM | OXYGEN SATURATION: 95 % | RESPIRATION RATE: 14 BRPM | DIASTOLIC BLOOD PRESSURE: 82 MMHG | HEIGHT: 69 IN | TEMPERATURE: 97.1 F | WEIGHT: 296.4 LBS

## 2025-02-06 DIAGNOSIS — Z12.5 SCREENING PSA (PROSTATE SPECIFIC ANTIGEN): ICD-10-CM

## 2025-02-06 DIAGNOSIS — M79.10 MYALGIA: ICD-10-CM

## 2025-02-06 DIAGNOSIS — D75.839 THROMBOCYTOSIS: ICD-10-CM

## 2025-02-06 DIAGNOSIS — M19.90 ARTHRITIS: ICD-10-CM

## 2025-02-06 DIAGNOSIS — E78.2 MIXED HYPERLIPIDEMIA: ICD-10-CM

## 2025-02-06 DIAGNOSIS — E53.8 VITAMIN B12 DEFICIENCY: ICD-10-CM

## 2025-02-06 DIAGNOSIS — M25.561 ACUTE PAIN OF RIGHT KNEE: ICD-10-CM

## 2025-02-06 DIAGNOSIS — Z12.11 COLON CANCER SCREENING: ICD-10-CM

## 2025-02-06 DIAGNOSIS — I10 HYPERTENSION, BENIGN: Primary | ICD-10-CM

## 2025-02-06 DIAGNOSIS — Z00.00 MEDICARE ANNUAL WELLNESS VISIT, INITIAL: ICD-10-CM

## 2025-02-06 DIAGNOSIS — G47.33 OBSTRUCTIVE SLEEP APNEA: Primary | ICD-10-CM

## 2025-02-06 DIAGNOSIS — Z71.89 ADVANCE CARE PLANNING: Primary | ICD-10-CM

## 2025-02-06 DIAGNOSIS — Z13.31 DEPRESSION SCREEN: ICD-10-CM

## 2025-02-06 DIAGNOSIS — E66.3 OVERWEIGHT: ICD-10-CM

## 2025-02-06 DIAGNOSIS — Z71.85 IMMUNIZATION COUNSELING: ICD-10-CM

## 2025-02-06 DIAGNOSIS — I44.0 1ST DEGREE AV BLOCK: ICD-10-CM

## 2025-02-06 DIAGNOSIS — E11.3293 TYPE 2 DIABETES MELLITUS WITH BOTH EYES AFFECTED BY MILD NONPROLIFERATIVE RETINOPATHY WITHOUT MACULAR EDEMA, WITHOUT LONG-TERM CURRENT USE OF INSULIN: ICD-10-CM

## 2025-02-06 PROCEDURE — 3078F DIAST BP <80 MM HG: CPT | Performed by: PSYCHIATRY & NEUROLOGY

## 2025-02-06 PROCEDURE — 1125F AMNT PAIN NOTED PAIN PRSNT: CPT | Performed by: FAMILY MEDICINE

## 2025-02-06 PROCEDURE — 99214 OFFICE O/P EST MOD 30 MIN: CPT | Performed by: FAMILY MEDICINE

## 2025-02-06 PROCEDURE — 1160F RVW MEDS BY RX/DR IN RCRD: CPT | Performed by: PSYCHIATRY & NEUROLOGY

## 2025-02-06 PROCEDURE — 3075F SYST BP GE 130 - 139MM HG: CPT | Performed by: PSYCHIATRY & NEUROLOGY

## 2025-02-06 PROCEDURE — 99213 OFFICE O/P EST LOW 20 MIN: CPT | Performed by: PSYCHIATRY & NEUROLOGY

## 2025-02-06 PROCEDURE — 1159F MED LIST DOCD IN RCRD: CPT | Performed by: PSYCHIATRY & NEUROLOGY

## 2025-02-06 PROCEDURE — 73562 X-RAY EXAM OF KNEE 3: CPT

## 2025-02-06 RX ORDER — INSULIN DEGLUDEC 200 U/ML
67 INJECTION, SOLUTION SUBCUTANEOUS DAILY
Qty: 3 ML | Refills: 3 | Status: SHIPPED | OUTPATIENT
Start: 2025-02-06

## 2025-02-06 NOTE — PROGRESS NOTES
"The ABCs of the Annual Wellness Visit  Subsequent Medicare Wellness Visit        Subjective    History of Present Illness:  hTor Crouch is a 64 y.o. male who presents for a Subsequent Medicare Wellness Visit.    The following portions of the patient's history were reviewed and   updated as appropriate: allergies, current medications, past family history, past medical history, past social history, past surgical history, and problem list.      Recent Hospitalizations:  He was not admitted to the hospital during the last year.       Current Medical Providers:  Patient Care Team:  Hayes Sharp MD as PCP - General (Family Medicine)  Seipel, Joseph F, MD as Consulting Physician (Neurology)  Dr. Manzo (Optometry)    Outpatient Medications Prior to Visit   Medication Sig Dispense Refill    Accu-Chek FastClix Lancets misc CHECK BLOOD SUGAR THREE TIMES DAILY 102 each 5    amLODIPine (NORVASC) 10 MG tablet Take 1 tablet by mouth Daily.      citalopram (CeleXA) 10 MG tablet TAKE 1 TABLET BY MOUTH DAILY 90 tablet 0    clopidogrel (PLAVIX) 75 MG tablet Take 1 tablet by mouth Daily. 90 tablet 04    Continuous Glucose  (Dexcom G7 ) device Use 1 Device Every 10 (Ten) Days. 12 each 12    Continuous Glucose Sensor (Dexcom G7 Sensor) misc Use 1 Device Every 10 (Ten) Days. 12 each 3    fluticasone (Flonase Allergy Relief) 50 MCG/ACT nasal spray 2 sprays by Each Nare route Daily. Shake well before using. 16 g 3    glucose blood (Accu-Chek Guide) test strip USE THREE TIMES DAILY 300 each 3    glucose blood (Accu-Chek Guide) test strip USE THREE TIMES DAILY 300 each 2    insulin regular (NovoLIN R) 100 UNIT/ML injection Inject 50 Units under the skin into the appropriate area as directed 2 (Two) Times a Day Before Meals. Take within 30 minutes of beginning meal. 6 each 5    Insulin Syringe-Needle U-100 (ReliOn Insulin Syringe) 31G X 15/64\" 1 ML misc Inject 1 syringe under the skin into the appropriate area as directed " "2 (Two) Times a Day. 100 each 5    lisinopril (PRINIVIL,ZESTRIL) 20 MG tablet TAKE 1 TABLET BY MOUTH TWICE DAILY 180 tablet 0    loratadine (Claritin) 10 MG tablet Take 1 tablet by mouth Daily. 90 tablet 0    lovastatin (MEVACOR) 40 MG tablet Take 1 tablet by mouth Every Evening. 90 tablet 0    metoprolol succinate XL (TOPROL-XL) 50 MG 24 hr tablet Take 1 tablet by mouth Daily.      ReliOn Insulin Syringe 31G X 15/64\" 1 ML misc Inject 80 Units under the skin into the appropriate area as directed 3 (Three) Times a Day. 100 each 12    insulin NPH (NovoLIN N ReliOn) 100 UNIT/ML injection Inject 50 units under the skin BID 6 each 5    baclofen (LIORESAL) 10 MG tablet TAKE 1 TABLET BY MOUTH THREE TIMES DAILY (Patient not taking: Reported on 2/6/2025) 90 tablet 0    empagliflozin (Jardiance) 10 MG tablet tablet Take 1 tablet by mouth Daily. (Patient not taking: Reported on 2/6/2025) 90 tablet 3     Facility-Administered Medications Prior to Visit   Medication Dose Route Frequency Provider Last Rate Last Admin    cyanocobalamin injection 1,000 mcg  1,000 mcg Intramuscular Q28 Days Hayes Sharp MD   1,000 mcg at 07/22/20 1727    cyanocobalamin injection 1,000 mcg  1,000 mcg Intramuscular Q28 Days Hayes Sharp MD   1,000 mcg at 11/02/20 1725       No opioid medication identified on active medication list. I have reviewed chart for other potential  high risk medication/s and harmful drug interactions in the elderly.        Aspirin is not on active medication list.  Aspirin use is not indicated based on review of current medical condition/s. Risk of harm outweighs potential benefits.  .    Patient Active Problem List   Diagnosis    Cataract of both eyes    Diabetic nephropathy associated with type 2 diabetes mellitus    Family history of ischemic heart disease    Hyperlipidemia    Hypertension, benign    Left atrial enlargement    Obstructive sleep apnea    Overweight    Seasonal allergic rhinitis due to pollen    " "Situational stress    Type 2 diabetes mellitus with both eyes affected by mild nonproliferative retinopathy without macular edema, without long-term current use of insulin    Vitamin B12 deficiency    Stroke with cerebral ischemia    Malignant neoplasm of prostate    Neoplasm, uncertain whether benign or malignant    Hypoglycemia    Encounter for general adult medical examination with abnormal findings    Lethargy    Colon cancer screening    COVID-19 virus infection    Screening PSA (prostate specific antigen)    Immunization counseling    Welcome to Medicare preventive visit    Advance care planning    Depression screen    Hyperkalemia    Cervical disc disease    Thrombocytosis    1st degree AV block    Chronic pain of left knee    Need for immunization against influenza    Stage 3a chronic kidney disease    Medicare annual wellness visit, initial    Arthritis    Myalgia    Right knee pain            Above medical problems all reviewed and significant problems identified and reviewed in the E and M visit.   Objective          Vitals:    02/06/25 0938   BP: 138/82   BP Location: Left arm   Patient Position: Sitting   Cuff Size: Large Adult   Pulse: 78   Resp: 14   Temp: 97.1 °F (36.2 °C)   SpO2: 95%   Weight: 134 kg (296 lb 6.4 oz)   Height: 175.3 cm (69\")   PainSc:   8     Estimated body mass index is 43.77 kg/m² as calculated from the following:    Height as of this encounter: 175.3 cm (69\").    Weight as of this encounter: 134 kg (296 lb 6.4 oz).           Does the patient have evidence of cognitive impairment? No           Family History   Problem Relation Age of Onset    Heart disease Mother         ischemic    Arthritis Mother     Goiter Father     Heart disease Father         ischemic    Heart attack Father     Diabetes Sister         diabetes mellitus    Diabetes Brother         diabetes mellitus    Diabetes Maternal Uncle         diabetes mellitus    Diabetes Paternal Grandmother         diabetes mellitus "    Stroke Paternal Grandfather     Kidney failure Other         Uncle    Arthritis Other         grandmother       Compared to one year ago, the patient feels his physical   health is worse.    Compared to one year ago, the patient feels his mental   health is the same.    HEALTH RISK ASSESSMENT    Smoking Status:  Social History     Tobacco Use   Smoking Status Never    Passive exposure: Never   Smokeless Tobacco Never     Alcohol Consumption:  Social History     Substance and Sexual Activity   Alcohol Use No     Fall Risk Screen:    CARMENADI Fall Risk Assessment was completed, and patient is at LOW risk for falls.Assessment completed on:2025    Depression Screening:  Depression screen reviewed and discussed with patient. Recommendations were not needed. Medications were not discussed, counseling was not discussed. We spent 3 minutes with the screen and discussion of depression and options.         2025     9:43 AM   PHQ-2/PHQ-9 Depression Screening   Little interest or pleasure in doing things Not at all   Feeling down, depressed, or hopeless Not at all   How difficult have these problems made it for you to do your work, take care of things at home, or get along with other people? Not difficult at all       Health Habits and Functional and Cognitive Screenin/6/2025     9:41 AM   Functional & Cognitive Status   Do you have difficulty preparing food and eating? No   Do you have difficulty bathing yourself, getting dressed or grooming yourself? No   Do you have difficulty using the toilet? No   Do you have difficulty moving around from place to place? No   Do you have trouble with steps or getting out of a bed or a chair? No   Current Diet Well Balanced Diet   Dental Exam Not up to date   Eye Exam Up to date        Eye Exam Comment  Dr. Manzo   Exercise (times per week) 0 times per week   Current Exercises Include No Regular Exercise   Do you need help using the phone?  No   Are you deaf or do  you have serious difficulty hearing?  No   Do you need help to go to places out of walking distance? No   Do you need help shopping? No   Do you need help preparing meals?  No   Do you need help with housework?  No   Do you need help with laundry? No   Do you need help taking your medications? No   Do you need help managing money? No   Do you ever drive or ride in a car without wearing a seat belt? No   Have you felt unusual stress, anger or loneliness in the last month? No   Who do you live with? Spouse   If you need help, do you have trouble finding someone available to you? No   Have you been bothered in the last four weeks by sexual problems? No   Do you have difficulty concentrating, remembering or making decisions? No       Age-appropriate Screening Schedule:  Refer to the list below for future screening recommendations based on patient's age, sex and/or medical conditions. Orders for these recommended tests are listed in the plan section. The patient has been provided with a written plan.    Health Maintenance   Topic Date Due    COLORECTAL CANCER SCREENING  Never done    ZOSTER VACCINE (1 of 2) Never done    Pneumococcal Vaccine 50+ (2 of 2 - PCV) 12/12/2008    HEPATITIS C SCREENING  Never done    COVID-19 Vaccine (3 - 2024-25 season) 09/01/2024    HEMOGLOBIN A1C  06/02/2025    DIABETIC EYE EXAM  08/23/2025    LIPID PANEL  12/02/2025    ANNUAL WELLNESS VISIT  02/06/2026    BMI FOLLOWUP  02/06/2026    TDAP/TD VACCINES (3 - Td or Tdap) 05/27/2027    INFLUENZA VACCINE  Completed    URINE MICROALBUMIN-CREATININE RATIO (uACR)  Discontinued       Immunizations:  Thor Crouch is due for Shingrix and Prevnar    Colorectal Screening  Thor Crouch declines colorectal screening.      Assessment & Plan   CMS Preventative Services Quick Reference    Risk Factors Identified During Encounter      Immunizations Discussed/Encouraged: Prevnar 20 (Pneumococcal 20-valent conjugate) and Shingrix  The above risks/problems have been  discussed with the patient.  Follow up actions/plans if indicated are seen below in the Assessment/Plan Section.  Pertinent information has been shared with the patient in the After Visit Summary.    I have identified multiple medical problems which are significant and separately identifiable that require added work above and beyond the wellness visit. These are not trivial or insignificant and are billed with a 25-modifier  These are in a separate E/M note      Sit-to-Stand Exercise    The sit-to-stand exercise (also known as the chair stand or chair rise exercise) strengthens your lower body and helps you maintain or improve your mobility and independence. The goal is to do the sit-to-stand exercise without using your hands. This will be easier as you become stronger. You should always talk with your health care provider before starting any exercise program, especially if you have had recent surgery.  Do the exercise exactly as told by your health care provider and adjust it as directed. It is normal to feel mild stretching, pulling, tightness, or discomfort as you do this exercise, but you should stop right away if you feel sudden pain or your pain gets worse. Do not begin doing this exercise until told by your health care provider.  What the sit-to-stand exercise does  The sit-to-stand exercise helps to strengthen the muscles in your thighs and the muscles in the center of your body that give you stability (core muscles). This exercise is especially helpful if:  You have had knee or hip surgery.  You have trouble getting up from a chair, out of a car, or off the toilet.  How to do the sit-to-stand exercise  Sit toward the front edge of a sturdy chair without armrests. Your knees should be bent and your feet should be flat on the floor and shoulder-width apart.  Place your hands lightly on each side of the seat. Keep your back and neck as straight as possible, with your chest slightly forward.  Breathe in slowly.  Lean forward and slightly shift your weight to the front of your feet.  Breathe out as you slowly stand up. Use your hands as little as possible.  Stand and pause for a full breath in and out.  Breathe in as you sit down slowly. Tighten your core and abdominal muscles to control your lowering as much as possible.  Breathe out slowly.  Do this exercise 10-15 times. If needed, do it fewer times until you build up strength.  Rest for 1 minute, then do another set of 10-15 repetitions.  To change the difficulty of the sit-to-stand exercise  If the exercise is too difficult, use a chair with sturdy armrests, and push off the armrests to help you come to the standing position. You can also use the armrests to help slowly lower yourself back to sitting. As this gets easier, try to use your arms less. You can also place a firm cushion or pillow on the chair to make the surface higher.  If this exercise is too easy, do not use your arms to help raise or lower yourself. You can also wear a weighted vest, use hand weights, increase your repetitions, or try a lower chair.  General tips  You may feel tired when starting an exercise routine. This is normal.  You may have muscle soreness that lasts a few days. This is normal. As you get stronger, you may not feel muscle soreness.  Use smooth, steady movements.  Do not  hold your breath during strength exercises. This can cause unsafe changes in your blood pressure.  Breathe in slowly through your nose, and breathe out slowly through your mouth.  Summary  Strengthening your lower body is an important step to help you move safely and independently.  The sit-to-stand exercise helps strengthen the muscles in your thighs and core.  You should always talk with your health care provider before starting any exercise program, especially if you have had recent surgery.  This information is not intended to replace advice given to you by your health care provider. Make sure you discuss any  questions you have with your health care provider.  Document Revised: 10/16/2019 Document Reviewed: 02/08/2018  Elsevier Patient Education © 2021 [employer]      Fall Prevention in the Home, Adult  Falls can cause injuries and can happen to people of all ages. There are many things you can do to make your home safe and to help prevent falls. Ask for help when making these changes.  What actions can I take to prevent falls?  General Instructions  Use good lighting in all rooms. Replace any light bulbs that burn out.  Turn on the lights in dark areas. Use night-lights.  Keep items that you use often in easy-to-reach places. Lower the shelves around your home if needed.  Set up your furniture so you have a clear path. Avoid moving your furniture around.  Do not have throw rugs or other things on the floor that can make you trip.  Avoid walking on wet floors.  If any of your floors are uneven, fix them.  Add color or contrast paint or tape to clearly merry and help you see:  Grab bars or handrails.  First and last steps of staircases.  Where the edge of each step is.  If you use a stepladder:  Make sure that it is fully opened. Do not climb a closed stepladder.  Make sure the sides of the stepladder are locked in place.  Ask someone to hold the stepladder while you use it.  Know where your pets are when moving through your home.  What can I do in the bathroom?         Keep the floor dry. Clean up any water on the floor right away.  Remove soap buildup in the tub or shower.  Use nonskid mats or decals on the floor of the tub or shower.  Attach bath mats securely with double-sided, nonslip rug tape.  If you need to sit down in the shower, use a plastic, nonslip stool.  Install grab bars by the toilet and in the tub and shower. Do not use towel bars as grab bars.  What can I do in the bedroom?  Make sure that you have a light by your bed that is easy to reach.  Do not use any sheets or blankets for your bed that hang to  the floor.  Have a firm chair with side arms that you can use for support when you get dressed.  What can I do in the kitchen?  Clean up any spills right away.  If you need to reach something above you, use a step stool with a grab bar.  Keep electrical cords out of the way.  Do not use floor polish or wax that makes floors slippery.  What can I do with my stairs?  Do not leave any items on the stairs.  Make sure that you have a light switch at the top and the bottom of the stairs.  Make sure that there are handrails on both sides of the stairs. Fix handrails that are broken or loose.  Install nonslip stair treads on all your stairs.  Avoid having throw rugs at the top or bottom of the stairs.  Choose a carpet that does not hide the edge of the steps on the stairs.  Check carpeting to make sure that it is firmly attached to the stairs. Fix carpet that is loose or worn.  What can I do on the outside of my home?  Use bright outdoor lighting.  Fix the edges of walkways and driveways and fix any cracks.  Remove anything that might make you trip as you walk through a door, such as a raised step or threshold.  Trim any bushes or trees on paths to your home.  Check to see if handrails are loose or broken and that both sides of all steps have handrails.  Install guardrails along the edges of any raised decks and porches.  Clear paths of anything that can make you trip, such as tools or rocks.  Have leaves, snow, or ice cleared regularly.  Use sand or salt on paths during winter.  Clean up any spills in your garage right away. This includes grease or oil spills.  What other actions can I take?  Wear shoes that:  Have a low heel. Do not wear high heels.  Have rubber bottoms.  Feel good on your feet and fit well.  Are closed at the toe. Do not wear open-toe sandals.  Use tools that help you move around if needed. These include:  Canes.  Walkers.  Scooters.  Crutches.  Review your medicines with your doctor. Some medicines can  make you feel dizzy. This can increase your chance of falling.  Ask your doctor what else you can do to help prevent falls.  Where to find more information  Centers for Disease Control and Prevention[place]USTO: www.cdc.gov  [place] on Aging: www.lorrie.nih.gov  Contact a doctor if:  You are afraid of falling at home.  You feel weak, drowsy, or dizzy at home.  You fall at home.  Summary  There are many simple things that you can do to make your home safe and to help prevent falls.  Ways to make your home safe include removing things that can make you trip and installing grab bars in the bathroom.  Ask for help when making these changes in your home.  This information is not intended to replace advice given to you by your health care provider. Make sure you discuss any questions you have with your health care provider.  Document Revised: 07/21/2021 Document Reviewed: 07/21/2021  [employer] Patient Education © 2021 [employer] Inc[employer]            Advance Care Planning    Advance Directive is on file.  ACP discussion was held with the patient during this visit. Patient has an advance directive in EMR which is still valid.   Discussion with Patient regarding advanced directives. POST form discussed at length and reviewed with patient. POST reviewed and updated today. I spent 16 minutes  with patient reviewing information and documenting  in the chart.  Patient states he does want CPR. Reviewed medical interventions with patient and the differences between each: Comfort, Limited and Full. Patient opted for Full. Discussed the use of antibiotics at the end of life. He chose to use antibiotics consistent with treatment goals. Discussed artificially administered nutrition, patient is aware that if he is alert and oriented they can change their mind at any time. However, they have elected to have no artificial nutrition. Patient has identified his spouse Tram Crouch as his healthcare representative. Advised to discuss with  healthcare representative if they can comply with wishes. Patient encouraged to have a meeting to discuss his decision regarding advanced care directives and goals of care with extended family and significant  friends  In regard to the POST form:The patient opted to complete POST while in the office and copy scanned into the chart. and advised to give to family members, place in an easily accessible place and take with them if going to the hospital or Emergency room.      Follow Up:   Future Appointments         Provider Department Center    2/27/2025 2:15 PM Hayes Sharp MD [facility] [place]            An After Visit Summary and PPPS were made available to the patient.      Diagnoses and all orders for this visit:    1. Advance care planning (Primary)  Assessment & Plan:  POST completed 2-6-25 and scanned into chart      2. Depression screen  Assessment & Plan:  Depression screen done and was negative.       3. Immunization counseling  Overview:  COVID 3-10-21, 12-6-21 and   T-Dap 5-27-15  Flu 9-2024  Advised to check with insurance on shingles coverage and get Pneu 20 at next visit.    Assessment & Plan:  Patient is due for Prevnar 20- Needs to get at his PE 2      4. Medicare annual wellness visit, initial  Assessment & Plan:  Completed---POST and Depression screen also done

## 2025-02-06 NOTE — PROGRESS NOTES
Subjective   Thor Crouch is a 64 y.o. male.   No chief complaint on file.    I have identified multiple medical problems which are significant and separately identifiable that require added work above and beyond the wellness visit. These are not trivial or insignificant and are billed with a 25-modifier    History of Present Illness    1st degree AV block    Hypertension  This is a chronic problem. The current episode started more than 1 year ago. The problem has been stable since onset. Pertinent negatives include no chest pain, palpitations or shortness of breath.   Knee Pain   The incident occurred more than 1 week ago. The pain is present in the right knee. The quality of the pain is described as aching. The pain is at a severity of 8/10. The pain is moderate.                  Review of Systems   Constitutional:  Negative for fatigue.   HENT:  Negative for hearing loss.    Respiratory:  Negative for shortness of breath.    Cardiovascular:  Negative for chest pain and palpitations.   Genitourinary:  Negative for frequency.   Musculoskeletal:  Negative for joint swelling.   Neurological:  Negative for memory problem.       Objective     Physical Exam  Vitals and nursing note reviewed.   Constitutional:       Appearance: He is well-developed.   HENT:      Head: Normocephalic.   Neck:      Thyroid: No thyromegaly.      Vascular: No carotid bruit.      Trachea: Trachea normal.   Cardiovascular:      Rate and Rhythm: Normal rate and regular rhythm.      Heart sounds: No murmur heard.     No friction rub. No gallop.   Pulmonary:      Effort: Pulmonary effort is normal. No respiratory distress.      Breath sounds: Normal breath sounds. No wheezing.   Chest:      Chest wall: No tenderness.   Musculoskeletal:      Cervical back: Neck supple.      Right knee: Swelling (post) present. Tenderness present.        Legs:    Skin:     General: Skin is dry.      Findings: No rash.      Nails: There is no clubbing.   Neurological:       Mental Status: He is alert and oriented to person, place, and time.   Psychiatric:         Behavior: Behavior is cooperative.         Assessment & Plan   Problem List Items Addressed This Visit          Medium    Hyperlipidemia    Overview     LDL goal less than 70         Current Assessment & Plan      Will order labs today and patient will return for results and shared decision making.           Relevant Orders    Comprehensive Metabolic Panel    Lipid Panel    Hypertension, benign - Primary    Current Assessment & Plan     Doing well;  Encouraged to watch salt, exercise more and lose weight.  Patient tolerated norvasc, lisinopril, metoprolol well without side effects. I feel the benefits of the medication outweigh the risks.           Type 2 diabetes mellitus with both eyes affected by mild nonproliferative retinopathy without macular edema, without long-term current use of insulin    Overview     Novolin N  50 units at HS and 50 units in the AM  Novolin R 50-50 units with meals.  He is also on Jardiance and tolerates this well.  He has side effects from metformin cannot tolerate this.  Off Trulicity due to cost         Current Assessment & Plan     Will order labs today and patient will return for results and shared decision making.           Relevant Medications    Insulin Degludec (Tresiba FlexTouch) 200 UNIT/ML solution pen-injector pen injection    Other Relevant Orders    Hemoglobin A1c    Microalbumin / Creatinine Urine Ratio - Urine, Clean Catch       Low    Vitamin B12 deficiency    Overview                Current Assessment & Plan     Will order labs today and patient will return for results and shared decision making.           Relevant Orders    Vitamin B12    Methylmalonic Acid, Serum       Unprioritized    1st degree AV block    Current Assessment & Plan     Offered to repeat EKG- patient declines         Arthritis    Current Assessment & Plan     Will order labs today and patient will return  for results and shared decision making.           Relevant Orders    C-reactive protein    Colon cancer screening    Current Assessment & Plan     Patient declines colonoscopy and cologard         Myalgia    Current Assessment & Plan     Will order labs today and patient will return for results and shared decision making.           Relevant Orders    CK    Overweight    Current Assessment & Plan     Patient's (There is no height or weight on file to calculate BMI.) indicates that they are overweight with health conditions that include hypertension . Weight is unchanged. BMI is above average; BMI management plan is completed. We discussed low calorie, low carb based diet program, portion control, and increasing exercise.          Right knee pain    Current Assessment & Plan     Possibly a Baker's cyst- Will order xray today and patient will return for results and shared decision making.  Recommended using voltaren gel and doing quad exercises.          Relevant Orders    XR Knee 3 View Right (Completed)    Screening PSA (prostate specific antigen)    Current Assessment & Plan     Will order labs today and patient will return for results and shared decision making.           Relevant Orders    PSA Screen    Thrombocytosis    Current Assessment & Plan     Will order labs today and patient will return for results and shared decision making.           Relevant Orders    CBC & Differential

## 2025-02-12 ENCOUNTER — TELEPHONE (OUTPATIENT)
Dept: FAMILY MEDICINE CLINIC | Facility: CLINIC | Age: 65
End: 2025-02-12
Payer: MEDICARE

## 2025-02-12 NOTE — TELEPHONE ENCOUNTER
Called and notified patient that knee xray shows fluid on the knee and we will discuss further at visit on 2-27-25

## 2025-02-13 ENCOUNTER — TELEPHONE (OUTPATIENT)
Dept: FAMILY MEDICINE CLINIC | Facility: CLINIC | Age: 65
End: 2025-02-13

## 2025-02-13 DIAGNOSIS — E11.3293 TYPE 2 DIABETES MELLITUS WITH BOTH EYES AFFECTED BY MILD NONPROLIFERATIVE RETINOPATHY WITHOUT MACULAR EDEMA, WITHOUT LONG-TERM CURRENT USE OF INSULIN: ICD-10-CM

## 2025-02-13 RX ORDER — ACYCLOVIR 400 MG/1
1 TABLET ORAL
Qty: 12 EACH | Refills: 3 | Status: CANCELLED | OUTPATIENT
Start: 2025-02-13

## 2025-02-13 NOTE — TELEPHONE ENCOUNTER
Caller: Thor Crouch    Relationship to patient: Self      Best call back number: [phone]     Provider: CHERYL Mariano PA needed: Continuous Glucose Sensor (Dexcom G7 Sensor) misc     Reason for call/Prior Auth: OFFICE NOTES FROM LAST VISIT

## 2025-02-17 ENCOUNTER — TELEPHONE (OUTPATIENT)
Dept: FAMILY MEDICINE CLINIC | Facility: CLINIC | Age: 65
End: 2025-02-17

## 2025-02-17 DIAGNOSIS — E11.3293 TYPE 2 DIABETES MELLITUS WITH BOTH EYES AFFECTED BY MILD NONPROLIFERATIVE RETINOPATHY WITHOUT MACULAR EDEMA, WITHOUT LONG-TERM CURRENT USE OF INSULIN: ICD-10-CM

## 2025-02-17 RX ORDER — ACYCLOVIR 400 MG/1
1 TABLET ORAL
Qty: 3 EACH | Refills: 12 | Status: SHIPPED | OUTPATIENT
Start: 2025-02-17

## 2025-02-17 RX ORDER — ACYCLOVIR 400 MG/1
1 TABLET ORAL
Qty: 3 EACH | Refills: 3 | Status: SHIPPED | OUTPATIENT
Start: 2025-02-17

## 2025-02-17 NOTE — TELEPHONE ENCOUNTER
Caller: Thor Crouch    Relationship: Self    Best call back number: [phone]     Which medication are you concerned about: G7 SENSORS    Who prescribed you this medication: DR STARR    What are your concerns: PATIENT STATED HE SPOKE WITH MEDICARE, AND WAS ADVISED THAT DR STARR HAS TO CONTACT THEM VIA THE PROVIDER INFORMATION LINE.    PATIENT STATED MEDICARE WILL NOT GIVE HIM ANY INFORMATION.    PATIENT STATED HE WAS ADVISED THAT THE PRESCRIPTION HAS TO BE SENT TO [facility] AGAIN WITH WHATEVER INFORMATION MEDICARE NEEDS AS WELL.

## 2025-02-22 LAB
ALBUMIN SERPL-MCNC: 3.9 G/DL (ref 3.9–4.9)
ALP SERPL-CCNC: 103 IU/L (ref 44–121)
ALT SERPL-CCNC: 25 IU/L (ref 0–44)
AST SERPL-CCNC: 21 IU/L (ref 0–40)
BASOPHILS # BLD AUTO: 0.1 X10E3/UL (ref 0–0.2)
BASOPHILS NFR BLD AUTO: 1 %
BILIRUB SERPL-MCNC: 0.5 MG/DL (ref 0–1.2)
BUN SERPL-MCNC: 23 MG/DL (ref 8–27)
BUN/CREAT SERPL: 19 (ref 10–24)
CALCIUM SERPL-MCNC: 9.2 MG/DL (ref 8.6–10.2)
CHLORIDE SERPL-SCNC: 102 MMOL/L (ref 96–106)
CHOLEST SERPL-MCNC: 165 MG/DL (ref 100–199)
CK SERPL-CCNC: 115 U/L (ref 41–331)
CO2 SERPL-SCNC: 22 MMOL/L (ref 20–29)
CREAT SERPL-MCNC: 1.23 MG/DL (ref 0.76–1.27)
CRP SERPL-MCNC: 15 MG/L (ref 0–10)
EGFRCR SERPLBLD CKD-EPI 2021: 65 ML/MIN/1.73
EOSINOPHIL # BLD AUTO: 0.3 X10E3/UL (ref 0–0.4)
EOSINOPHIL NFR BLD AUTO: 4 %
ERYTHROCYTE [DISTWIDTH] IN BLOOD BY AUTOMATED COUNT: 12.5 % (ref 11.6–15.4)
GLOBULIN SER CALC-MCNC: 3 G/DL (ref 1.5–4.5)
GLUCOSE SERPL-MCNC: 185 MG/DL (ref 70–99)
HBA1C MFR BLD: 7.4 % (ref 4.8–5.6)
HCT VFR BLD AUTO: 46.1 % (ref 37.5–51)
HDLC SERPL-MCNC: 35 MG/DL
HGB BLD-MCNC: 14.8 G/DL (ref 13–17.7)
IMM GRANULOCYTES # BLD AUTO: 0.1 X10E3/UL (ref 0–0.1)
IMM GRANULOCYTES NFR BLD AUTO: 1 %
LDLC SERPL CALC-MCNC: 105 MG/DL (ref 0–99)
LYMPHOCYTES # BLD AUTO: 1.5 X10E3/UL (ref 0.7–3.1)
LYMPHOCYTES NFR BLD AUTO: 18 %
MCH RBC QN AUTO: 28.7 PG (ref 26.6–33)
MCHC RBC AUTO-ENTMCNC: 32.1 G/DL (ref 31.5–35.7)
MCV RBC AUTO: 89 FL (ref 79–97)
MONOCYTES # BLD AUTO: 0.7 X10E3/UL (ref 0.1–0.9)
MONOCYTES NFR BLD AUTO: 8 %
NEUTROPHILS # BLD AUTO: 6 X10E3/UL (ref 1.4–7)
NEUTROPHILS NFR BLD AUTO: 68 %
PLATELET # BLD AUTO: 415 X10E3/UL (ref 150–450)
POTASSIUM SERPL-SCNC: 5.2 MMOL/L (ref 3.5–5.2)
PROT SERPL-MCNC: 6.9 G/DL (ref 6–8.5)
PSA SERPL-MCNC: 0.1 NG/ML (ref 0–4)
RBC # BLD AUTO: 5.16 X10E6/UL (ref 4.14–5.8)
SODIUM SERPL-SCNC: 139 MMOL/L (ref 134–144)
SPECIMEN STATUS: NORMAL
TRIGL SERPL-MCNC: 141 MG/DL (ref 0–149)
VIT B12 SERPL-MCNC: 273 PG/ML (ref 232–1245)
VLDLC SERPL CALC-MCNC: 25 MG/DL (ref 5–40)
WBC # BLD AUTO: 8.6 X10E3/UL (ref 3.4–10.8)

## 2025-02-24 LAB — METHYLMALONATE SERPL-SCNC: 278 NMOL/L (ref 0–378)

## 2025-02-24 NOTE — ASSESSMENT & PLAN NOTE
A1c done 2-, read by me, reviewed with pt.  A1c was 7.4 down from 7.9,   Improved.  Advised to adjust Novolin R with meals and activity.  Encouraged to watch sugar intake, exercise more and lose weight.   Compliant with medication. Patient tolerated Novolin R and Treshiba well without side effects. I feel the benefits of the medication outweigh the risks.   Monitoring sugar at home.   Follow up in 3 months  Care management needs are self-addressed.Self-management abilities addressed and patient is capable of managing his own disease.

## 2025-02-24 NOTE — ASSESSMENT & PLAN NOTE
Lipid and CMP  done 2-, read by me, reviewed with pt.  Trig. 141 up from 127, Tot. Chol. 165 up from 152, HDL 35 down from 37,  up from 92  Worsening.   Encouraged to watch fatty intake, exercise more, and lose weight.   Compliant with medication.  Patient tolerated Lovastatin well without side effects. I feel the benefits of the medication outweigh the risks.   Is not getting adequate diet and exercise  Goals developed at last visit were not met   Follow up in 3  months, if not improved will increase Lovastatin  Care management needs are self-addressed. Self-management abilities addressed and patient is capable of managing his own disease.

## 2025-02-27 ENCOUNTER — OFFICE VISIT (OUTPATIENT)
Dept: FAMILY MEDICINE CLINIC | Facility: CLINIC | Age: 65
End: 2025-02-27
Payer: MEDICARE

## 2025-02-27 VITALS
OXYGEN SATURATION: 96 % | RESPIRATION RATE: 18 BRPM | BODY MASS INDEX: 43.43 KG/M2 | HEART RATE: 85 BPM | HEIGHT: 69 IN | SYSTOLIC BLOOD PRESSURE: 124 MMHG | WEIGHT: 293.2 LBS | DIASTOLIC BLOOD PRESSURE: 74 MMHG | TEMPERATURE: 97.1 F

## 2025-02-27 DIAGNOSIS — M79.10 MYALGIA: ICD-10-CM

## 2025-02-27 DIAGNOSIS — E78.2 MIXED HYPERLIPIDEMIA: Primary | ICD-10-CM

## 2025-02-27 DIAGNOSIS — M25.561 ACUTE PAIN OF RIGHT KNEE: ICD-10-CM

## 2025-02-27 DIAGNOSIS — G89.29 CHRONIC PAIN OF LEFT KNEE: ICD-10-CM

## 2025-02-27 DIAGNOSIS — Z12.5 SCREENING PSA (PROSTATE SPECIFIC ANTIGEN): ICD-10-CM

## 2025-02-27 DIAGNOSIS — E11.21 DIABETIC NEPHROPATHY ASSOCIATED WITH TYPE 2 DIABETES MELLITUS: ICD-10-CM

## 2025-02-27 DIAGNOSIS — U07.1 COVID-19 VIRUS INFECTION: ICD-10-CM

## 2025-02-27 DIAGNOSIS — E66.3 OVERWEIGHT: ICD-10-CM

## 2025-02-27 DIAGNOSIS — M25.562 CHRONIC PAIN OF LEFT KNEE: ICD-10-CM

## 2025-02-27 DIAGNOSIS — D48.9 NEOPLASM, UNCERTAIN WHETHER BENIGN OR MALIGNANT: ICD-10-CM

## 2025-02-27 DIAGNOSIS — R79.82 ELEVATED C-REACTIVE PROTEIN (CRP): ICD-10-CM

## 2025-02-27 DIAGNOSIS — I63.9 STROKE WITH CEREBRAL ISCHEMIA: ICD-10-CM

## 2025-02-27 DIAGNOSIS — G47.33 OBSTRUCTIVE SLEEP APNEA: ICD-10-CM

## 2025-02-27 DIAGNOSIS — I51.7 LEFT ATRIAL ENLARGEMENT: ICD-10-CM

## 2025-02-27 DIAGNOSIS — D75.839 THROMBOCYTOSIS: ICD-10-CM

## 2025-02-27 DIAGNOSIS — H25.093 AGE-RELATED INCIPIENT CATARACT OF BOTH EYES: ICD-10-CM

## 2025-02-27 DIAGNOSIS — Z00.01 ENCOUNTER FOR GENERAL ADULT MEDICAL EXAMINATION WITH ABNORMAL FINDINGS: ICD-10-CM

## 2025-02-27 DIAGNOSIS — Z23 NEED FOR PNEUMOCOCCAL VACCINE: ICD-10-CM

## 2025-02-27 DIAGNOSIS — M19.90 ARTHRITIS: ICD-10-CM

## 2025-02-27 DIAGNOSIS — J30.1 SEASONAL ALLERGIC RHINITIS DUE TO POLLEN: ICD-10-CM

## 2025-02-27 DIAGNOSIS — E16.2 HYPOGLYCEMIA: ICD-10-CM

## 2025-02-27 DIAGNOSIS — Z82.49 FAMILY HISTORY OF ISCHEMIC HEART DISEASE: ICD-10-CM

## 2025-02-27 DIAGNOSIS — I10 HYPERTENSION, BENIGN: ICD-10-CM

## 2025-02-27 DIAGNOSIS — I44.0 1ST DEGREE AV BLOCK: ICD-10-CM

## 2025-02-27 DIAGNOSIS — C61 MALIGNANT NEOPLASM OF PROSTATE: ICD-10-CM

## 2025-02-27 DIAGNOSIS — M50.90 CERVICAL DISC DISEASE: ICD-10-CM

## 2025-02-27 DIAGNOSIS — E11.3293 TYPE 2 DIABETES MELLITUS WITH BOTH EYES AFFECTED BY MILD NONPROLIFERATIVE RETINOPATHY WITHOUT MACULAR EDEMA, WITHOUT LONG-TERM CURRENT USE OF INSULIN: ICD-10-CM

## 2025-02-27 DIAGNOSIS — E53.8 VITAMIN B12 DEFICIENCY: ICD-10-CM

## 2025-02-27 DIAGNOSIS — E87.5 HYPERKALEMIA: ICD-10-CM

## 2025-02-27 DIAGNOSIS — R53.83 LETHARGY: ICD-10-CM

## 2025-02-27 DIAGNOSIS — N18.31 STAGE 3A CHRONIC KIDNEY DISEASE: ICD-10-CM

## 2025-02-27 DIAGNOSIS — Z71.85 IMMUNIZATION COUNSELING: ICD-10-CM

## 2025-02-27 DIAGNOSIS — F43.9 SITUATIONAL STRESS: ICD-10-CM

## 2025-02-27 RX ORDER — AMLODIPINE BESYLATE 10 MG/1
10 TABLET ORAL DAILY
Qty: 90 TABLET | Refills: 3 | Status: SHIPPED | OUTPATIENT
Start: 2025-02-27

## 2025-02-27 RX ORDER — LISINOPRIL 20 MG/1
20 TABLET ORAL 2 TIMES DAILY
Qty: 180 TABLET | Refills: 3 | Status: SHIPPED | OUTPATIENT
Start: 2025-02-27

## 2025-02-27 RX ORDER — LORATADINE 10 MG/1
10 TABLET ORAL DAILY
Start: 2025-02-27

## 2025-02-27 RX ORDER — METOPROLOL SUCCINATE 50 MG/1
50 TABLET, EXTENDED RELEASE ORAL DAILY
Qty: 90 TABLET | Refills: 3 | Status: SHIPPED | OUTPATIENT
Start: 2025-02-27

## 2025-02-27 RX ORDER — CITALOPRAM HYDROBROMIDE 10 MG/1
10 TABLET ORAL DAILY
Qty: 90 TABLET | Refills: 3 | Status: SHIPPED | OUTPATIENT
Start: 2025-02-27

## 2025-02-27 RX ORDER — CLOPIDOGREL BISULFATE 75 MG/1
75 TABLET ORAL DAILY
Qty: 90 TABLET | Refills: 4 | Status: SHIPPED | OUTPATIENT
Start: 2025-02-27

## 2025-02-27 RX ORDER — INSULIN DEGLUDEC 200 U/ML
67 INJECTION, SOLUTION SUBCUTANEOUS DAILY
Qty: 3 ML | Refills: 3 | Status: SHIPPED | OUTPATIENT
Start: 2025-02-27

## 2025-02-27 RX ORDER — HUMAN INSULIN 100 [IU]/ML
50 INJECTION, SOLUTION SUBCUTANEOUS
Qty: 6 EACH | Refills: 5 | Status: SHIPPED | OUTPATIENT
Start: 2025-02-27

## 2025-02-27 RX ORDER — LOVASTATIN 40 MG/1
40 TABLET ORAL EVERY EVENING
Qty: 90 TABLET | Refills: 3 | Status: SHIPPED | OUTPATIENT
Start: 2025-02-27

## 2025-02-27 RX ORDER — FLUTICASONE PROPIONATE 50 MCG
2 SPRAY, SUSPENSION (ML) NASAL DAILY
Start: 2025-02-27

## 2025-02-27 NOTE — ASSESSMENT & PLAN NOTE
Keep risk factors low.  Patient tolerated Plavix well without side effects. I feel the benefits of the medication outweigh the risks.

## 2025-02-27 NOTE — PROGRESS NOTES
Subjective   Thor Crouch is a 64 y.o. male here for his annual physical with me. Thor is here for coordination of medical care, to discuss health maintenance, disease prevention as well as to followup on medical problems. Patient has been followed by me since 2003. Patient's last CPE was 1-23-24. Activity level is minimal. Exercises 0 per week. Appetite is good. Feels fairly well with many complaints. Energy level is good. Sleeps well. Patient's last colonoscopy was never. He is advised to have a colonoscopy now.  Patient declines colonoscopy and cologuard.   Patient is doing routine self skin exam monthly. Patient is doing routine self-breast exams monthly. Patient is checking testicles monthly.    Lab Results   Component Value Date    PSA 0.1 02/21/2025        History of Present Illness  Follow up situational stress.    Follow up elevated CRP.  Diabetes  He presents for his follow-up diabetic visit. He has type 2 diabetes mellitus. His disease course has been improving. Pertinent negatives for hypoglycemia include no dizziness, nervousness/anxiousness or speech difficulty. Pertinent negatives for diabetes include no blurred vision, no chest pain, no fatigue, no polydipsia, no polyphagia, no polyuria, no weakness and no weight loss. Diabetic complications include a CVA.   Hyperlipidemia  This is a chronic problem. The current episode started more than 1 year ago. Recent lipid tests were reviewed and are high. Exacerbating diseases include diabetes and obesity. Factors aggravating his hyperlipidemia include fatty foods. Pertinent negatives include no chest pain, myalgias or shortness of breath. Current antihyperlipidemic treatment includes statins. The current treatment provides no improvement of lipids.   Cerebrovascular Accident  Symptoms are chronic.   Symptoms occur constantly.   Symptoms have been unchanged since onset.   Pertinent negative symptoms include no abdominal pain, no chest pain, no chills, no  congestion, no cough, no fatigue, no fever, no myalgias, no nausea, no neck pain, no rash, no sore throat, no dysuria, no vomiting and no weakness.        The following portions of the patient's history were reviewed and updated as appropriate: allergies, current medications, past family history, past medical history, past social history, past surgical history, and problem list.    Past Medical History:   Diagnosis Date    Cataract of both eyes     Other cataract of both eyes; Impression: Stable    Diabetic nephropathy associated with type 2 diabetes mellitus     Impression: Protein normal 06/18, will continue to repeat.    Epiretinal membrane, left     Impression: Followed with Dr. Murcia    History of prostate cancer     Impression: Patient has a follow up appt with Dr. Taylor NP today in [place], discussed with patient about transferring to Dr. Li or Michelle, so he can see them in the [place] office.    Hypertension, benign     Impression: Good control; Encouraged to watch salt, exercise more and lose weight    Left atrial enlargement     Impression: Stable, will follow conservatively    Malignant neoplasm of prostate     Impression: Doing well. Followed with Dr. Washington    Microcytic anemia     Impression: cbc with next labs    Overweight     >25    Seasonal allergic rhinitis due to pollen     Impression: Doing well    Situational stress     Impression: Stable    Skin lesion     Unspecified Skin Lesion; Impression: Will obtain notes from [facility].    Stroke with cerebral ischemia     Impression: Doing well at this time. Will obtain records from Dr. Seipel, regarding medication Plavix.    Type 2 diabetes mellitus with both eyes affected by mild nonproliferative retinopathy without macular edema, without long-term current use of insulin     Impression: Uncertain control; Advised patient to start monitoring blood sugar at home since taking OTC cough/cold medications.    Vitamin B12  "deficiency     Impression: Patient was supposed to recieve a B12 injection, however patient left before it was done. Patient was called to come back to get this injection.       Family History   Problem Relation Age of Onset    Heart disease Mother         ischemic    Arthritis Mother     Goiter Father     Heart disease Father         ischemic    Heart attack Father     Diabetes Sister         diabetes mellitus    Diabetes Brother         diabetes mellitus    Diabetes Maternal Uncle         diabetes mellitus    Diabetes Paternal Grandmother         diabetes mellitus    Stroke Paternal Grandfather     Kidney failure Other         Uncle    Arthritis Other         grandmother       Social History     Socioeconomic History    Marital status:    Tobacco Use    Smoking status: Never     Passive exposure: Never    Smokeless tobacco: Never   Vaping Use    Vaping status: Never Used   Substance and Sexual Activity    Alcohol use: No    Drug use: No    Sexual activity: Defer       Social History     Social History Narrative     1 x in 1979.  No children at home.  Retired 1-1-24 from  [employer] in [place] after 47 years of service.   No Caffeine   Always wears seatbelts,  No exercise.  Hobbies mowing grass        Past Surgical History:   Procedure Laterality Date    PROSTATE SURGERY         Current Outpatient Medications on File Prior to Visit   Medication Sig Dispense Refill    Accu-Chek FastClix Lancets misc CHECK BLOOD SUGAR THREE TIMES DAILY 102 each 5    Continuous Glucose  (Dexcom G7 ) device Use 1 Device Every 10 (Ten) Days. 3 each 12    Continuous Glucose Sensor (Dexcom G7 Sensor) misc Use 1 Device Every 10 (Ten) Days. 3 each 3    glucose blood (Accu-Chek Guide) test strip USE THREE TIMES DAILY 300 each 3    glucose blood (Accu-Chek Guide) test strip USE THREE TIMES DAILY 300 each 2    Insulin Syringe-Needle U-100 (ReliOn Insulin Syringe) 31G X 15/64\" 1 ML misc Inject 1 syringe under the skin " "into the appropriate area as directed 2 (Two) Times a Day. 100 each 5    ReliOn Insulin Syringe 31G X 15/64\" 1 ML misc Inject 80 Units under the skin into the appropriate area as directed 3 (Three) Times a Day. 100 each 12     Current Facility-Administered Medications on File Prior to Visit   Medication Dose Route Frequency Provider Last Rate Last Admin    cyanocobalamin injection 1,000 mcg  1,000 mcg Intramuscular Q28 Days Hayes Sharp MD   1,000 mcg at 07/22/20 1727    cyanocobalamin injection 1,000 mcg  1,000 mcg Intramuscular Q28 Days Hayes Sharp MD   1,000 mcg at 11/02/20 1725       Allergies   Allergen Reactions    Sulfa Antibiotics Dizziness       Review of Systems   Constitutional:  Negative for appetite change, chills, fatigue, fever, unexpected weight gain and unexpected weight loss.   HENT:  Negative for congestion, dental problem, ear discharge, ear pain, hearing loss, nosebleeds, postnasal drip, rhinorrhea, sinus pressure, sneezing, sore throat, tinnitus and voice change.         Last dental exam 7-2013 at [facility]-advised to follow   Eyes:  Negative for blurred vision, double vision, pain, redness and visual disturbance.        Last vision exam 9-2024 with Dr. Manzo-Doing well   Respiratory:  Negative for cough, shortness of breath, wheezing and stridor.    Cardiovascular:  Negative for chest pain, palpitations and leg swelling.   Gastrointestinal:  Negative for abdominal pain, anal bleeding, blood in stool, constipation, diarrhea, nausea, rectal pain, vomiting, GERD and indigestion.        7 BM weekly   Endocrine: Negative for cold intolerance, heat intolerance, polydipsia, polyphagia and polyuria.        Sex Drive  He is a 0  She is a 0   Genitourinary:  Negative for difficulty urinating, dysuria, frequency, hematuria and urgency.   Musculoskeletal:  Negative for back pain, joint swelling, myalgias, neck pain and neck stiffness.   Skin:  Negative for color change, dry skin and " "rash.   Neurological:  Negative for dizziness, syncope, speech difficulty, weakness, light-headedness, headache and memory problem.   Hematological:  Does not bruise/bleed easily.   Psychiatric/Behavioral:  Negative for decreased concentration, sleep disturbance, depressed mood and stress. The patient is not nervous/anxious.        Objective   Visit Vitals  /74 (BP Location: Left arm, Patient Position: Sitting, Cuff Size: Large Adult)   Pulse 85   Temp 97.1 °F (36.2 °C) (Temporal)   Resp 18   Ht 175.3 cm (69\")   Wt 133 kg (293 lb 3.2 oz)   SpO2 96%   BMI 43.30 kg/m²     Physical Exam  Constitutional:       General: He is not in acute distress.     Appearance: He is well-developed. He is not diaphoretic.   HENT:      Head: Normocephalic.      Right Ear: Tympanic membrane and external ear normal. There is impacted cerumen (mild).      Left Ear: Tympanic membrane, ear canal and external ear normal.      Nose: Nose normal.      Mouth/Throat:      Lips: Pink.      Mouth: Mucous membranes are moist.      Pharynx: Oropharynx is clear. No oropharyngeal exudate.   Eyes:      General: Lids are normal. No scleral icterus.        Right eye: No discharge.         Left eye: No discharge.      Extraocular Movements: Extraocular movements intact.      Conjunctiva/sclera: Conjunctivae normal.      Pupils: Pupils are equal, round, and reactive to light.      Comments: Wears glasses   Neck:      Trachea: Trachea normal.   Cardiovascular:      Rate and Rhythm: Normal rate and regular rhythm.      Pulses:           Dorsalis pedis pulses are 1+ on the right side and 1+ on the left side.        Posterior tibial pulses are 0 on the right side and 1+ on the left side.      Heart sounds: Normal heart sounds. No murmur heard.  Pulmonary:      Effort: Pulmonary effort is normal.      Breath sounds: Normal breath sounds. No wheezing.   Chest:      Chest wall: No tenderness.   Abdominal:      General: Bowel sounds are normal. There is no " distension.      Palpations: Abdomen is soft. There is no mass.      Tenderness: There is no abdominal tenderness.      Hernia: No hernia is present.   Genitourinary:     Penis: Normal. No tenderness.       Comments: Pt. Declined rectal and prostate exam  Musculoskeletal:         General: No tenderness or deformity. Normal range of motion.      Cervical back: Full passive range of motion without pain, normal range of motion and neck supple.   Lymphadenopathy:      Cervical: No cervical adenopathy.   Skin:     General: Skin is warm and dry.      Findings: No erythema or rash.      Comments: Nevus of the left ear.  Blue nevus right hip.  Onychomycosis Mild-moderate of the Left Great nail and all left toes, mild of the Right Great and 3rd nails.  Seb. Keratosis scattered over the back.  Skin tag left axillae.  Erythematous lesion of the right upper chest.     Neurological:      General: No focal deficit present.      Mental Status: He is alert and oriented to person, place, and time.      Cranial Nerves: Cranial nerves 2-12 are intact. No cranial nerve deficit.      Sensory: Sensation is intact. No sensory deficit.      Motor: Motor function is intact. No abnormal muscle tone.      Coordination: Coordination is intact. Coordination normal.      Gait: Gait is intact.      Deep Tendon Reflexes: Reflexes normal.   Psychiatric:         Behavior: Behavior normal.         Thought Content: Thought content normal.         Judgment: Judgment normal.         Assessment & Plan   Problem List Items Addressed This Visit          High    Malignant neoplasm of prostate    Current Assessment & Plan     Doing well and released by Urology         Situational stress    Current Assessment & Plan     Improved.  Advised decreasing Celexa to every other day and then completely stop and if he needs to then restart.  Patient tolerated Celexa well without side effects. I feel the benefits of the medication outweigh the risks.          Relevant  Medications    citalopram (CeleXA) 10 MG tablet    Stroke with cerebral ischemia    Overview     Per DR. Seiple continue Plavix for the rest of his life.         Current Assessment & Plan     Keep risk factors low.  Patient tolerated Plavix well without side effects. I feel the benefits of the medication outweigh the risks.             Medium    Diabetic nephropathy associated with type 2 diabetes mellitus    Current Assessment & Plan     Patient unable to void for Microalbumin/creatinine ratio and given another order         Relevant Medications    Insulin Degludec (Tresiba FlexTouch) 200 UNIT/ML solution pen-injector pen injection    insulin regular (NovoLIN R) 100 UNIT/ML injection    Hyperlipidemia - Primary    Overview     LDL goal less than 70         Current Assessment & Plan     Lipid and CMP  done 2-, read by me, reviewed with pt.  Trig. 141 up from 127, Tot. Chol. 165 up from 152, HDL 35 down from 37,  up from 92  Worsening.   Encouraged to watch fatty intake, exercise more, and lose weight.   Compliant with medication.  Patient tolerated Lovastatin well without side effects. I feel the benefits of the medication outweigh the risks.   Is not getting adequate diet and exercise  Goals developed at last visit were not met   Follow up in 3  months, if not improved will increase Lovastatin  Care management needs are self-addressed. Self-management abilities addressed and patient is capable of managing his own disease.           Relevant Medications    lovastatin (MEVACOR) 40 MG tablet    Other Relevant Orders    Lipid Panel With / Chol / HDL Ratio    Comprehensive Metabolic Panel    Hypertension, benign    Current Assessment & Plan     Doing well on Norvasc, metoprolol and lisinopril without side effects.  Benefits of the drugs outweigh the risk.  Encouraged to watch salt, exercise more and lose weight.           Relevant Medications    amLODIPine (NORVASC) 10 MG tablet    lisinopril  (PRINIVIL,ZESTRIL) 20 MG tablet    metoprolol succinate XL (TOPROL-XL) 50 MG 24 hr tablet    Stage 3a chronic kidney disease    Current Assessment & Plan     Resolved x 1.  CMP done 2-, read by me, reviewed with pt.  Creatinine and EGFR was normal x1.         Type 2 diabetes mellitus with both eyes affected by mild nonproliferative retinopathy without macular edema, without long-term current use of insulin    Overview     Tresibia 50 units twice a day  Novolin R 50-50 units with meals.  He is developing hypoglycemia and hyperglycemia due to use in the standard dose.  He will adjust dose up and down depending on his activity level and intake    He has side effects from metformin cannot tolerate this.  Off Trulicity due to cost         Current Assessment & Plan     A1c done 2-, read by me, reviewed with pt.  A1c was 7.4 down from 7.9,   Improved.  Advised to adjust Novolin R with meals and activity.  Encouraged to watch sugar intake, exercise more and lose weight.   Compliant with medication. Patient tolerated Novolin R and Treshiba well without side effects. I feel the benefits of the medication outweigh the risks.   Monitoring sugar at home.   Follow up in 3 months  Care management needs are self-addressed.Self-management abilities addressed and patient is capable of managing his own disease.           Relevant Medications    Insulin Degludec (Tresiba FlexTouch) 200 UNIT/ML solution pen-injector pen injection    insulin regular (NovoLIN R) 100 UNIT/ML injection    Other Relevant Orders    Microalbumin / Creatinine Urine Ratio - Urine, Clean Catch (Completed)    Hemoglobin A1c       Low    Left atrial enlargement    Current Assessment & Plan     Advised to keep B/P in good control            Relevant Medications    amLODIPine (NORVASC) 10 MG tablet    metoprolol succinate XL (TOPROL-XL) 50 MG 24 hr tablet    clopidogrel (PLAVIX) 75 MG tablet    Obstructive sleep apnea    Overview     Followed with   Seiple         Current Assessment & Plan     Doing well with C-pap         Seasonal allergic rhinitis due to pollen    Current Assessment & Plan     Doing well.  Patient tolerated Claritin and Flonase well without side effects. I feel the benefits of the medication outweigh the risks.          Relevant Medications    fluticasone (Flonase Allergy Relief) 50 MCG/ACT nasal spray    loratadine (Claritin) 10 MG tablet    Vitamin B12 deficiency    Overview       Methylmalonic acid done February 6, 2025 was normal at 278         Current Assessment & Plan     Doing well.  Vit. B 12  done 2-, read by me, reviewed with pt.  B12 was 273 down from 349            Unprioritized    1st degree AV block    Current Assessment & Plan     Offered to repeat EKG- patient declines         Relevant Medications    metoprolol succinate XL (TOPROL-XL) 50 MG 24 hr tablet    Arthritis    Current Assessment & Plan     Stable.  CRP  done 2-, read by me, reviewed with pt.  CRP was 15         Cataract of both eyes    Current Assessment & Plan     Followed with Dr. Manzo         Cervical disc disease    Current Assessment & Plan     Doing well         RESOLVED: Chronic pain of left knee    Current Assessment & Plan     Resolved , thus delete.           RESOLVED: COVID-19 virus infection    Current Assessment & Plan     Resolved thus delete         Elevated C-reactive protein (CRP)    Current Assessment & Plan     New dx.  CRP  done 2-, read by me, reviewed with pt.  CRP was 15.   Will repeat with next labs.           Relevant Orders    C-reactive protein    Encounter for general adult medical examination with abnormal findings    Current Assessment & Plan     Encouraged to do self-breast exam, self-testicle exams, and self derm exams. Congratulated on using seat belts.  Encouraged to do annual physical exams.  Immunization status reviewed.           Family history of ischemic heart disease    Overview     -Father MI at age 74,  Mother had a stent at 70.           Current Assessment & Plan     Advised to lower risk factors.         Hyperkalemia    Current Assessment & Plan     Resolved x 2.  CMP  done 2-, read by me, reviewed with pt.  Potassium was resolved x 2         Hypoglycemia    Overview     Patient felt bad with glucose of 78, advised he tolerate lower glucose as diabetes becomes more controlled.  Patient improved to 94 after 3 glucose tablets.    2 x monthly         Current Assessment & Plan     Improved with insulin adjustment         Immunization counseling    Overview     UTD as of 2-27-25 on all except Shingrix    COVID 3-10-21, 12-6-21 and 3-  T-Dap 5-27-17  Flu 9-2024  PNUE 20   2-27-25           Current Assessment & Plan     Advised to check with insurance on shingles coverage Pneu 20 given today         RESOLVED: Lethargy    Current Assessment & Plan     Resolved thus delete         RESOLVED: Myalgia    Current Assessment & Plan     Normal thus delete.  CPK  done 2-, read by me, reviewed with pt.  CPK was 115 up from 110         RESOLVED: Neoplasm, uncertain whether benign or malignant    Overview     Left ear         Current Assessment & Plan     Resolved thus delete         Overweight    Current Assessment & Plan     Patient's (Body mass index is 43.3 kg/m².) indicates that they are overweight with health conditions that include obstructive sleep apnea, hypertension, diabetes mellitus, and dyslipidemias . Weight is worsening. BMI is above average; BMI management plan is completed. We discussed portion control and increasing exercise.          Right knee pain    Overview     May be radiculopathy pain from the back.         Current Assessment & Plan     Stable.  Xray done 2-6-2025, result reviewed with pt.  Discussed CT and MRI, pt. Declines at present.  He is on baclofen which helps somewhat.  He has no side effects and I feel benefits outweigh the risk but he is to use this sparingly         Screening  PSA (prostate specific antigen)    Current Assessment & Plan     Doing well.  PSA  done 2-, read by me, reviewed with pt.  PSA was 0.1 down from 0.4         RESOLVED: Thrombocytosis    Current Assessment & Plan     Resolved x 3, thus delete.  CBC  done 2-, read by me, reviewed with pt.  CBC was normal x 3         Relevant Medications    clopidogrel (PLAVIX) 75 MG tablet     Other Visit Diagnoses       Need for pneumococcal vaccine        Relevant Orders    Pneumococcal Conjugate Vaccine 20-Valent (PCV20) (Completed)                      Encouraged to check his skin, testicles and breasts monthly. Reviewed exercising regularly, eating a balanced diet, immunizations and if due, patient counselled to check with insurance company for coverage;, and regularly checking skin and breasts.

## 2025-02-27 NOTE — ASSESSMENT & PLAN NOTE
Doing well.  Patient tolerated Claritin and Flonase well without side effects. I feel the benefits of the medication outweigh the risks.

## 2025-02-27 NOTE — ASSESSMENT & PLAN NOTE
Improved.  Advised decreasing Celexa to every other day and then completely stop and if he needs to then restart.  Patient tolerated Celexa well without side effects. I feel the benefits of the medication outweigh the risks.

## 2025-02-27 NOTE — ASSESSMENT & PLAN NOTE
Doing well on Norvasc, metoprolol and lisinopril without side effects.  Benefits of the drugs outweigh the risk.  Encouraged to watch salt, exercise more and lose weight.

## 2025-02-27 NOTE — ASSESSMENT & PLAN NOTE
Patient's (Body mass index is 43.3 kg/m².) indicates that they are overweight with health conditions that include obstructive sleep apnea, hypertension, diabetes mellitus, and dyslipidemias . Weight is worsening. BMI is above average; BMI management plan is completed. We discussed portion control and increasing exercise.

## 2025-02-27 NOTE — ASSESSMENT & PLAN NOTE
Stable.  Xray done 2-6-2025, result reviewed with pt.  Discussed CT and MRI, pt. Declines at present.  He is on baclofen which helps somewhat.  He has no side effects and I feel benefits outweigh the risk but he is to use this sparingly

## 2025-03-01 LAB
ALBUMIN/CREAT UR: 152 MG/G CREAT (ref 0–29)
CREAT UR-MCNC: 149.8 MG/DL
MICROALBUMIN UR-MCNC: 228 UG/ML

## 2025-03-05 ENCOUNTER — TELEPHONE (OUTPATIENT)
Dept: FAMILY MEDICINE CLINIC | Facility: CLINIC | Age: 65
End: 2025-03-05
Payer: MEDICARE

## 2025-03-05 DIAGNOSIS — E11.3293 TYPE 2 DIABETES MELLITUS WITH BOTH EYES AFFECTED BY MILD NONPROLIFERATIVE RETINOPATHY WITHOUT MACULAR EDEMA, WITHOUT LONG-TERM CURRENT USE OF INSULIN: Primary | ICD-10-CM

## 2025-03-24 RX ORDER — INSULIN DEGLUDEC 200 U/ML
INJECTION, SOLUTION SUBCUTANEOUS
Qty: 9 ML | Refills: 0 | Status: SHIPPED | OUTPATIENT
Start: 2025-03-24

## 2025-03-24 NOTE — TELEPHONE ENCOUNTER
ONLY HAS HALF A PEN LEFT NEEDS REFILLED ASAP   
82 yo RH F with PMH pAF (not on AC due to falls), SVT s/p PPM, tracheomalacia (with PEG), COPD with chronic hypoxic respiratory failure on home O2, HTN, Alzheimers (AAOx2 baseline), hypothyroidism, recurrent UTIs who presents with L sided weakness. Patient was sitting on the toilet and after her bowel movement, she became lethargic and minimally responsive. Aid states that sometimes she gets weak and "out of it" after her bowel movements but this time was different See below

## 2025-04-08 ENCOUNTER — TELEPHONE (OUTPATIENT)
Dept: FAMILY MEDICINE CLINIC | Facility: CLINIC | Age: 65
End: 2025-04-08
Payer: MEDICARE
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
## 2025-04-10 ENCOUNTER — TELEPHONE (OUTPATIENT)
Dept: FAMILY MEDICINE CLINIC | Facility: CLINIC | Age: 65
End: 2025-04-10
Payer: MEDICARE
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
## 2025-04-11 ENCOUNTER — TELEPHONE (OUTPATIENT)
Dept: FAMILY MEDICINE CLINIC | Facility: CLINIC | Age: 65
End: 2025-04-11
Payer: MEDICARE

## 2025-04-11 NOTE — TELEPHONE ENCOUNTER
Patient's wife called requesting to move [facility] f/u to the 16th instead. Please advise
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
## 2025-04-14 DIAGNOSIS — I51.7 LEFT ATRIAL ENLARGEMENT: ICD-10-CM

## 2025-04-14 NOTE — TELEPHONE ENCOUNTER
Caller: JORDI ART    Relationship: Emergency Contact    Best call back number: [phone]     What was the call regarding: PATIENT'S WIFE STATED THAT SHE WOULD LIKE TO HAVE THE APPOINTMENT MOVED TO ANY DATE THIS WEEK BEFORE THURSDAY. PATIENT'S WIFE STATED THAT SHE FEELS HE NEEDS TO BE SEEN SOONER, SHE HAS A LOT OF QUESTIONS AND CONCERNS FOR DR. STARR REGARDING PATIENT. PLEASE ADVISE.

## 2025-04-15 ENCOUNTER — OFFICE VISIT (OUTPATIENT)
Dept: FAMILY MEDICINE CLINIC | Facility: CLINIC | Age: 65
End: 2025-04-15
Payer: MEDICARE

## 2025-04-15 VITALS
DIASTOLIC BLOOD PRESSURE: 67 MMHG | SYSTOLIC BLOOD PRESSURE: 150 MMHG | HEART RATE: 66 BPM | BODY MASS INDEX: 44.73 KG/M2 | WEIGHT: 302 LBS | TEMPERATURE: 97.2 F | HEIGHT: 69 IN | RESPIRATION RATE: 14 BRPM | OXYGEN SATURATION: 94 %

## 2025-04-15 DIAGNOSIS — N17.9 ACUTE RENAL FAILURE, UNSPECIFIED ACUTE RENAL FAILURE TYPE: ICD-10-CM

## 2025-04-15 DIAGNOSIS — K59.00 CONSTIPATION, UNSPECIFIED CONSTIPATION TYPE: ICD-10-CM

## 2025-04-15 DIAGNOSIS — S22.49XD CLOSED FRACTURE OF MULTIPLE RIBS WITH ROUTINE HEALING, UNSPECIFIED LATERALITY, SUBSEQUENT ENCOUNTER: ICD-10-CM

## 2025-04-15 DIAGNOSIS — K21.9 GASTROESOPHAGEAL REFLUX DISEASE WITHOUT ESOPHAGITIS: Primary | ICD-10-CM

## 2025-04-15 DIAGNOSIS — K56.7 ILEUS: ICD-10-CM

## 2025-04-15 DIAGNOSIS — J93.9 PNEUMOTHORAX ON LEFT: ICD-10-CM

## 2025-04-15 DIAGNOSIS — I48.20 CHRONIC ATRIAL FIBRILLATION: ICD-10-CM

## 2025-04-15 DIAGNOSIS — T79.6XXA TRAUMATIC RHABDOMYOLYSIS, INITIAL ENCOUNTER: ICD-10-CM

## 2025-04-15 DIAGNOSIS — S27.329A CONTUSION OF LUNG, UNSPECIFIED LATERALITY, INITIAL ENCOUNTER: ICD-10-CM

## 2025-04-15 DIAGNOSIS — E11.3293 TYPE 2 DIABETES MELLITUS WITH BOTH EYES AFFECTED BY MILD NONPROLIFERATIVE RETINOPATHY WITHOUT MACULAR EDEMA, WITHOUT LONG-TERM CURRENT USE OF INSULIN: ICD-10-CM

## 2025-04-15 PROBLEM — M62.82 RHABDOMYOLYSIS: Status: ACTIVE | Noted: 2025-04-15

## 2025-04-15 PROBLEM — S22.49XA FRACTURE OF MULTIPLE RIBS: Status: ACTIVE | Noted: 2025-04-15

## 2025-04-15 RX ORDER — AZITHROMYCIN 500 MG/1
500 TABLET, FILM COATED ORAL DAILY
COMMUNITY

## 2025-04-15 RX ORDER — SENNOSIDES A AND B 8.6 MG/1
1 TABLET, FILM COATED ORAL DAILY
COMMUNITY

## 2025-04-15 RX ORDER — DILTIAZEM HYDROCHLORIDE 120 MG/1
120 TABLET, FILM COATED ORAL 4 TIMES DAILY
COMMUNITY

## 2025-04-15 RX ORDER — CLOPIDOGREL BISULFATE 75 MG/1
75 TABLET ORAL DAILY
Qty: 90 TABLET | Refills: 0 | Status: SHIPPED | OUTPATIENT
Start: 2025-04-15

## 2025-04-15 RX ORDER — PANTOPRAZOLE SODIUM 40 MG/1
40 TABLET, DELAYED RELEASE ORAL DAILY
COMMUNITY

## 2025-04-15 NOTE — ASSESSMENT & PLAN NOTE
Improving--Patient tolerated pantoprazole well without side effects. I feel the benefits of the medication outweigh the risks.

## 2025-04-15 NOTE — PROGRESS NOTES
Subjective   Thor Crouch is a 64 y.o. male.   No chief complaint on file.    History of Present Illness    Patient had a  accident on 4-7-25.  Patient was hospitalized at [facility]  Rib Injury  Symptoms are new.   Onset was in the past 7 days.   Symptoms occur daily.   Symptoms have been unchanged since onset.   Symptoms include chest pain.    Heartburn  He complains of belching and chest pain. He reports no wheezing. This is a new problem. The current episode started in the past 7 days. The problem has been gradually improving.   Atrial Fibrillation  Presents for initial visit. Symptoms include chest pain. Symptoms are negative for shortness of breath. The symptoms have been stable. Compliance with prior treatments has been good. Past medical history includes atrial fibrillation.   Constipation  This is a new problem. The current episode started in the past 7 days. The problem has been gradually improving since onset. He Does not exercise regularly.          The following portions of the patient's history were reviewed and updated as appropriate: allergies, current medications, past family history, past medical history, past social history, past surgical history, and problem list.    Past Medical History:   Diagnosis Date    Cataract of both eyes     Other cataract of both eyes; Impression: Stable    Diabetic nephropathy associated with type 2 diabetes mellitus     Impression: Protein normal 06/18, will continue to repeat.    Epiretinal membrane, left     Impression: Followed with Dr. Murcia    History of prostate cancer     Impression: Patient has a follow up appt with Dr. Taylor NP today in [place], discussed with patient about transferring to Dr. Li or Michelle, so he can see them in the [place] office.    Hypertension, benign     Impression: Good control; Encouraged to watch salt, exercise more and lose weight    Left atrial enlargement     Impression: Stable, will follow conservatively    Malignant  neoplasm of prostate     Impression: Doing well. Followed with Dr. Washington    Microcytic anemia     Impression: cbc with next labs    Overweight     >25    Seasonal allergic rhinitis due to pollen     Impression: Doing well    Situational stress     Impression: Stable    Skin lesion     Unspecified Skin Lesion; Impression: Will obtain notes from [facility].    Stroke with cerebral ischemia     Impression: Doing well at this time. Will obtain records from Dr. Seipel, regarding medication Plavix.    Type 2 diabetes mellitus with both eyes affected by mild nonproliferative retinopathy without macular edema, without long-term current use of insulin     Impression: Uncertain control; Advised patient to start monitoring blood sugar at home since taking OTC cough/cold medications.    Vitamin B12 deficiency     Impression: Patient was supposed to recieve a B12 injection, however patient left before it was done. Patient was called to come back to get this injection.       Past Surgical History:   Procedure Laterality Date    PROSTATE SURGERY          Family History   Problem Relation Age of Onset    Heart disease Mother         ischemic    Arthritis Mother     Goiter Father     Heart disease Father         ischemic    Heart attack Father     Diabetes Sister         diabetes mellitus    Diabetes Brother         diabetes mellitus    Diabetes Maternal Uncle         diabetes mellitus    Diabetes Paternal Grandmother         diabetes mellitus    Stroke Paternal Grandfather     Kidney failure Other         Uncle    Arthritis Other         grandmother        Social History     Socioeconomic History    Marital status:    Tobacco Use    Smoking status: Never     Passive exposure: Never    Smokeless tobacco: Never   Vaping Use    Vaping status: Never Used   Substance and Sexual Activity    Alcohol use: No    Drug use: No    Sexual activity: Defer       Outpatient Medications Prior to Visit   Medication Sig Dispense  "Refill    azithromycin (ZITHROMAX) 500 MG tablet Take 1 tablet by mouth Daily.      citalopram (CeleXA) 10 MG tablet Take 1 tablet by mouth Daily. 90 tablet 3    Continuous Glucose  (Dexcom G7 ) device Use 1 Device Every 10 (Ten) Days. 3 each 12    Continuous Glucose Sensor (Dexcom G7 Sensor) misc Use 1 Device Every 10 (Ten) Days. 3 each 3    dilTIAZem (CARDIZEM) 120 MG tablet Take 1 tablet by mouth 4 (Four) Times a Day.      fluticasone (Flonase Allergy Relief) 50 MCG/ACT nasal spray 2 sprays by Each Nare route Daily. Shake well before using.      glucose blood (Accu-Chek Guide) test strip USE THREE TIMES DAILY 300 each 3    glucose blood (Accu-Chek Guide) test strip USE THREE TIMES DAILY 300 each 2    Insulin Degludec FlexTouch 200 UNIT/ML solution pen-injector INJECT 67 UNITS UNDER THE SKIN INTO THE APPROPRIATE AREA AS DIRECTED DAILY 9 mL 0    insulin regular (NovoLIN R) 100 UNIT/ML injection Inject 50 Units under the skin into the appropriate area as directed 2 (Two) Times a Day Before Meals. Take within 30 minutes of beginning meal. 6 each 5    Insulin Syringe-Needle U-100 (ReliOn Insulin Syringe) 31G X 15/64\" 1 ML misc Inject 1 syringe under the skin into the appropriate area as directed 2 (Two) Times a Day. 100 each 5    lisinopril (PRINIVIL,ZESTRIL) 20 MG tablet Take 1 tablet by mouth 2 (Two) Times a Day. 180 tablet 3    lovastatin (MEVACOR) 40 MG tablet Take 1 tablet by mouth Every Evening. 90 tablet 3    metoprolol succinate XL (TOPROL-XL) 50 MG 24 hr tablet Take 1 tablet by mouth Daily. 90 tablet 3    pantoprazole (PROTONIX) 40 MG EC tablet Take 1 tablet by mouth Daily.      ReliOn Insulin Syringe 31G X 15/64\" 1 ML misc Inject 80 Units under the skin into the appropriate area as directed 3 (Three) Times a Day. 100 each 12    senna 8.6 MG tablet Take 1 tablet by mouth Daily.      clopidogrel (PLAVIX) 75 MG tablet Take 1 tablet by mouth Daily. 90 tablet 04    Accu-Chek FastClix Lancets misc " "CHECK BLOOD SUGAR THREE TIMES DAILY 102 each 5    amLODIPine (NORVASC) 10 MG tablet Take 1 tablet by mouth Daily. (Patient not taking: Reported on 4/15/2025) 90 tablet 3    loratadine (Claritin) 10 MG tablet Take 1 tablet by mouth Daily.       Facility-Administered Medications Prior to Visit   Medication Dose Route Frequency Provider Last Rate Last Admin    cyanocobalamin injection 1,000 mcg  1,000 mcg Intramuscular Q28 Days Hayes Sharp MD   1,000 mcg at 07/22/20 1727    cyanocobalamin injection 1,000 mcg  1,000 mcg Intramuscular Q28 Days Hayes Sharp MD   1,000 mcg at 11/02/20 1725        Review of Systems   Respiratory:  Negative for apnea, chest tightness, shortness of breath and wheezing.         No hemoptysis   Cardiovascular:  Positive for chest pain.   Gastrointestinal:  Positive for constipation.       Objective   Visit Vitals  /67 (BP Location: Right arm, Patient Position: Sitting, Cuff Size: Large Adult)   Pulse 66   Temp 97.2 °F (36.2 °C)   Resp 14   Ht 175.3 cm (69\")   Wt (!) 137 kg (302 lb)   SpO2 94% Comment: 3 L of oxygen   BMI 44.60 kg/m²     Physical Exam  Vitals and nursing note reviewed.   Constitutional:       Appearance: He is well-developed.   HENT:      Head: Normocephalic.   Neck:      Thyroid: No thyromegaly.      Vascular: No carotid bruit.      Trachea: Trachea normal.   Cardiovascular:      Rate and Rhythm: Normal rate and regular rhythm.      Heart sounds: No murmur heard.     No friction rub. No gallop.   Pulmonary:      Effort: Pulmonary effort is normal. No respiratory distress.      Breath sounds: Normal breath sounds. No wheezing.   Chest:      Chest wall: No tenderness.   Musculoskeletal:      Cervical back: Neck supple.      Comments: Ribs exquisitely tender   Skin:     General: Skin is dry.      Findings: No rash.      Nails: There is no clubbing.   Neurological:      Mental Status: He is alert and oriented to person, place, and time.   Psychiatric:         " Behavior: Behavior is cooperative.         Assessment & Plan   Problem List Items Addressed This Visit          High    Chronic atrial fibrillation    Current Assessment & Plan   New diagnosis found on EKG while in the hospital.  He is asymptomatic at the present thus will follow conservatively due to his multiple other medical problems.  I feel the risk of Eliquis greatly outweighs the benefits at this time.  He will discuss this with Dr. Ogden will place a referral to Dr. Ogden for an evaluation. I feel the risks of medication outweigh the benefits.          Relevant Medications    dilTIAZem (CARDIZEM) 120 MG tablet    Other Relevant Orders    Ambulatory Referral to Cardiology       Medium    Type 2 diabetes mellitus with both eyes affected by mild nonproliferative retinopathy without macular edema, without long-term current use of insulin    Overview   Tresibia 50 units twice a day  Novolin R 50-50 units with meals.  He is developing hypoglycemia and hyperglycemia due to use in the standard dose.  He will adjust dose up and down depending on his activity level and intake    He has side effects from metformin cannot tolerate this.  Off Trulicity due to cost         Current Assessment & Plan   Stable- Advised patient to watch for hypoglycemia since patient's appetite is decreased from his accident and being in pain            Unprioritized    Acute renal failure    Current Assessment & Plan   Resolved at this time.         Constipation    Current Assessment & Plan   Slight improvement-Patient has a BM last night. Continue Senna and add Miralax.          Fracture of multiple ribs    Overview   7 rib fractures         Current Assessment & Plan   Discussion was had with he and his wife regarding the seriousness of this and he is high risk for pneumonia.  He is to deep breathe every hour at a minimum.  He does have an incentive spirometer at home         Gastroesophageal reflux disease without esophagitis - Primary     Current Assessment & Plan   Improving--Patient tolerated pantoprazole well without side effects. I feel the benefits of the medication outweigh the risks.           Relevant Medications    pantoprazole (PROTONIX) 40 MG EC tablet    Ileus    Current Assessment & Plan   Found on xray while in the hospital.  Wrist called at the present         Pneumothorax on left    Current Assessment & Plan   Doing well at this time-Advised patient to start standing up and down every hour and attempt to walk.          Pulmonary contusion    Current Assessment & Plan   Improving         Rhabdomyolysis    Current Assessment & Plan   Resolved

## 2025-04-15 NOTE — ASSESSMENT & PLAN NOTE
Stable- Advised patient to watch for hypoglycemia since patient's appetite is decreased from his accident and being in pain

## 2025-04-15 NOTE — ASSESSMENT & PLAN NOTE
New diagnosis found on EKG while in the hospital.  He is asymptomatic at the present thus will follow conservatively due to his multiple other medical problems.  I feel the risk of Eliquis greatly outweighs the benefits at this time.  He will discuss this with Dr. Ogden will place a referral to Dr. Ogden for an evaluation. I feel the risks of medication outweigh the benefits.

## 2025-04-15 NOTE — ASSESSMENT & PLAN NOTE
Doing well at this time-Advised patient to start standing up and down every hour and attempt to walk.

## 2025-04-17 DIAGNOSIS — E11.3293 TYPE 2 DIABETES MELLITUS WITH BOTH EYES AFFECTED BY MILD NONPROLIFERATIVE RETINOPATHY WITHOUT MACULAR EDEMA, WITHOUT LONG-TERM CURRENT USE OF INSULIN: ICD-10-CM

## 2025-04-18 PROBLEM — Z79.4 DIABETES MELLITUS, TYPE II, INSULIN DEPENDENT: Status: ACTIVE | Noted: 2025-04-18

## 2025-04-18 PROBLEM — Z86.73 HISTORY OF CEREBROVASCULAR ACCIDENT: Status: ACTIVE | Noted: 2025-04-18

## 2025-04-18 PROBLEM — E11.9 DIABETES MELLITUS, TYPE II, INSULIN DEPENDENT: Status: ACTIVE | Noted: 2025-04-18

## 2025-04-18 PROBLEM — Z85.46 HISTORY OF PROSTATE CANCER: Status: ACTIVE | Noted: 2025-04-18

## 2025-04-18 NOTE — ASSESSMENT & PLAN NOTE
Discussion was had with he and his wife regarding the seriousness of this and he is high risk for pneumonia.  He is to deep breathe every hour at a minimum.  He does have an incentive spirometer at home

## 2025-04-21 RX ORDER — INSULIN DEGLUDEC 200 U/ML
INJECTION, SOLUTION SUBCUTANEOUS
Qty: 9 ML | Refills: 0 | Status: SHIPPED | OUTPATIENT
Start: 2025-04-21

## 2025-04-22 ENCOUNTER — OFFICE VISIT (OUTPATIENT)
Dept: FAMILY MEDICINE CLINIC | Facility: CLINIC | Age: 65
End: 2025-04-22
Payer: MEDICARE

## 2025-04-22 VITALS
OXYGEN SATURATION: 97 % | BODY MASS INDEX: 44.43 KG/M2 | WEIGHT: 300 LBS | RESPIRATION RATE: 14 BRPM | TEMPERATURE: 96.9 F | HEIGHT: 69 IN | SYSTOLIC BLOOD PRESSURE: 136 MMHG | DIASTOLIC BLOOD PRESSURE: 69 MMHG | HEART RATE: 73 BPM

## 2025-04-22 DIAGNOSIS — S22.49XD CLOSED FRACTURE OF MULTIPLE RIBS WITH ROUTINE HEALING, UNSPECIFIED LATERALITY, SUBSEQUENT ENCOUNTER: Primary | ICD-10-CM

## 2025-04-22 DIAGNOSIS — S22.49XD CLOSED FRACTURE OF MULTIPLE RIBS WITH ROUTINE HEALING, UNSPECIFIED LATERALITY, SUBSEQUENT ENCOUNTER: ICD-10-CM

## 2025-04-22 DIAGNOSIS — E66.3 OVERWEIGHT: ICD-10-CM

## 2025-04-22 DIAGNOSIS — J93.9 PNEUMOTHORAX ON LEFT: Primary | ICD-10-CM

## 2025-04-22 DIAGNOSIS — I48.20 CHRONIC ATRIAL FIBRILLATION: ICD-10-CM

## 2025-04-22 RX ORDER — OXYCODONE HYDROCHLORIDE 5 MG/1
5 CAPSULE ORAL EVERY 4 HOURS PRN
COMMUNITY
End: 2025-04-22 | Stop reason: SDUPTHER

## 2025-04-22 RX ORDER — OXYCODONE HYDROCHLORIDE 5 MG/1
5 CAPSULE ORAL EVERY 6 HOURS PRN
Qty: 50 CAPSULE | Refills: 0 | Status: SHIPPED | OUTPATIENT
Start: 2025-04-22 | End: 2025-04-22

## 2025-04-22 RX ORDER — BACLOFEN 10 MG/1
10 TABLET ORAL 3 TIMES DAILY
Qty: 90 TABLET | Refills: 0 | Status: SHIPPED | OUTPATIENT
Start: 2025-04-22 | End: 2025-04-28

## 2025-04-22 RX ORDER — OXYCODONE HYDROCHLORIDE 5 MG/1
5 TABLET ORAL EVERY 4 HOURS PRN
Qty: 50 TABLET | Refills: 0 | Status: SHIPPED | OUTPATIENT
Start: 2025-04-22 | End: 2025-04-28

## 2025-04-22 NOTE — ASSESSMENT & PLAN NOTE
Patient's (Body mass index is 44.3 kg/m².) indicates that they are overweight with health conditions that include diabetes mellitus . Weight is unchanged. BMI is above average; BMI management plan is completed. We discussed low calorie, low carb based diet program, portion control, and increasing exercise.

## 2025-04-22 NOTE — PROGRESS NOTES
Subjective   Thor Crouch is a 64 y.o. male.   No chief complaint on file.    Rib Injury  Symptoms are recurrent.   Onset was 1 to 4 weeks.   Symptoms occur daily.   Symptoms have been improved since onset.   Symptoms include chest pain.         The following portions of the patient's history were reviewed and updated as appropriate: allergies, current medications, past family history, past medical history, past social history, past surgical history, and problem list.    Past Medical History:   Diagnosis Date    Cataract of both eyes     Other cataract of both eyes; Impression: Stable    Diabetic nephropathy associated with type 2 diabetes mellitus     Impression: Protein normal 06/18, will continue to repeat.    Epiretinal membrane, left     Impression: Followed with Dr. Murcia    History of prostate cancer     Impression: Patient has a follow up appt with Dr. Taylor NP today in [place], discussed with patient about transferring to Dr. Li or Michelle, so he can see them in the [place] office.    Hypertension, benign     Impression: Good control; Encouraged to watch salt, exercise more and lose weight    Left atrial enlargement     Impression: Stable, will follow conservatively    Malignant neoplasm of prostate     Impression: Doing well. Followed with Dr. Washington    Microcytic anemia     Impression: cbc with next labs    Overweight     >25    Seasonal allergic rhinitis due to pollen     Impression: Doing well    Situational stress     Impression: Stable    Skin lesion     Unspecified Skin Lesion; Impression: Will obtain notes from [facility].    Stroke with cerebral ischemia     Impression: Doing well at this time. Will obtain records from Dr. Seipel, regarding medication Plavix.    Type 2 diabetes mellitus with both eyes affected by mild nonproliferative retinopathy without macular edema, without long-term current use of insulin     Impression: Uncertain control; Advised patient to start  monitoring blood sugar at home since taking OTC cough/cold medications.    Vitamin B12 deficiency     Impression: Patient was supposed to recieve a B12 injection, however patient left before it was done. Patient was called to come back to get this injection.       Past Surgical History:   Procedure Laterality Date    PROSTATE SURGERY          Family History   Problem Relation Age of Onset    Heart disease Mother         ischemic    Arthritis Mother     Goiter Father     Heart disease Father         ischemic    Heart attack Father     Diabetes Sister         diabetes mellitus    Diabetes Brother         diabetes mellitus    Diabetes Maternal Uncle         diabetes mellitus    Diabetes Paternal Grandmother         diabetes mellitus    Stroke Paternal Grandfather     Kidney failure Other         Uncle    Arthritis Other         grandmother        Social History     Socioeconomic History    Marital status:    Tobacco Use    Smoking status: Never     Passive exposure: Never    Smokeless tobacco: Never   Vaping Use    Vaping status: Never Used   Substance and Sexual Activity    Alcohol use: No    Drug use: No    Sexual activity: Defer       Outpatient Medications Prior to Visit   Medication Sig Dispense Refill    Accu-Chek FastClix Lancets misc CHECK BLOOD SUGAR THREE TIMES DAILY 102 each 5    citalopram (CeleXA) 10 MG tablet Take 1 tablet by mouth Daily. 90 tablet 3    clopidogrel (PLAVIX) 75 MG tablet TAKE 1 TABLET BY MOUTH EVERY DAY 90 tablet 0    Continuous Glucose  (Dexcom G7 ) device Use 1 Device Every 10 (Ten) Days. 3 each 12    Continuous Glucose Sensor (Dexcom G7 Sensor) misc Use 1 Device Every 10 (Ten) Days. 3 each 3    dilTIAZem (CARDIZEM) 120 MG tablet Take 1 tablet by mouth 4 (Four) Times a Day.      fluticasone (Flonase Allergy Relief) 50 MCG/ACT nasal spray 2 sprays by Each Nare route Daily. Shake well before using.      glucose blood (Accu-Chek Guide) test strip USE THREE TIMES DAILY  "300 each 3    glucose blood (Accu-Chek Guide) test strip USE THREE TIMES DAILY 300 each 2    Insulin Degludec FlexTouch 200 UNIT/ML solution pen-injector INJECT 67 UNITS UNDER THE SKIN INTO THE APPROPRIATE AREA AS DIRECTED DAILY 9 mL 0    insulin regular (NovoLIN R) 100 UNIT/ML injection Inject 50 Units under the skin into the appropriate area as directed 2 (Two) Times a Day Before Meals. Take within 30 minutes of beginning meal. 6 each 5    Insulin Syringe-Needle U-100 (ReliOn Insulin Syringe) 31G X 15/64\" 1 ML misc Inject 1 syringe under the skin into the appropriate area as directed 2 (Two) Times a Day. 100 each 5    lisinopril (PRINIVIL,ZESTRIL) 20 MG tablet Take 1 tablet by mouth 2 (Two) Times a Day. 180 tablet 3    loratadine (Claritin) 10 MG tablet Take 1 tablet by mouth Daily.      lovastatin (MEVACOR) 40 MG tablet Take 1 tablet by mouth Every Evening. 90 tablet 3    metoprolol succinate XL (TOPROL-XL) 50 MG 24 hr tablet Take 1 tablet by mouth Daily. 90 tablet 3    pantoprazole (PROTONIX) 40 MG EC tablet Take 1 tablet by mouth Daily.      ReliOn Insulin Syringe 31G X 15/64\" 1 ML misc Inject 80 Units under the skin into the appropriate area as directed 3 (Three) Times a Day. 100 each 12    senna 8.6 MG tablet Take 1 tablet by mouth Daily.      oxyCODONE (OXY-IR) 5 MG capsule Take 1 capsule by mouth Every 4 (Four) Hours As Needed for Moderate Pain.      amLODIPine (NORVASC) 10 MG tablet Take 1 tablet by mouth Daily. (Patient not taking: Reported on 4/22/2025) 90 tablet 3    azithromycin (ZITHROMAX) 500 MG tablet Take 1 tablet by mouth Daily. (Patient not taking: Reported on 4/22/2025)       Facility-Administered Medications Prior to Visit   Medication Dose Route Frequency Provider Last Rate Last Admin    cyanocobalamin injection 1,000 mcg  1,000 mcg Intramuscular Q28 Days Hayes Sharp MD   1,000 mcg at 07/22/20 1727    cyanocobalamin injection 1,000 mcg  1,000 mcg Intramuscular Q28 Days Hayes Sharp, " "MD   1,000 mcg at 11/02/20 1728        Review of Systems   Respiratory:  Positive for shortness of breath. Negative for wheezing.    Cardiovascular:  Positive for chest pain.       Objective   Visit Vitals  /69 (BP Location: Right arm, Patient Position: Sitting, Cuff Size: Large Adult)   Pulse 73   Temp 96.9 °F (36.1 °C)   Resp 14   Ht 175.3 cm (69\")   Wt 136 kg (300 lb)   SpO2 97%   BMI 44.30 kg/m²     Physical Exam  Vitals and nursing note reviewed.   Constitutional:       Appearance: He is well-developed.   HENT:      Head: Normocephalic.   Neck:      Thyroid: No thyromegaly.      Vascular: No carotid bruit.      Trachea: Trachea normal.   Cardiovascular:      Rate and Rhythm: Normal rate and regular rhythm.      Heart sounds: No murmur heard.     No friction rub. No gallop.   Pulmonary:      Effort: Pulmonary effort is normal. No respiratory distress.      Breath sounds: Normal breath sounds. No wheezing.   Chest:      Chest wall: No tenderness.   Musculoskeletal:      Cervical back: Neck supple.      Comments: Ribs exquisitely tender   Skin:     General: Skin is dry.      Findings: No rash.      Nails: There is no clubbing.   Neurological:      Mental Status: He is alert and oriented to person, place, and time.   Psychiatric:         Behavior: Behavior is cooperative.         Assessment & Plan   Problem List Items Addressed This Visit          High    Chronic atrial fibrillation    Current Assessment & Plan   Need to consider the use of Eliquis but due to multiple fractures we will hold on this at the present.  He is due to see Dr. Ogden to discuss this.            Unprioritized    Fracture of multiple ribs    Overview   7 rib fractures         Current Assessment & Plan   Improving on a scale of 0-10 he is a 5 today.  Discussed decreasing Pain meds to every 6 hours.  Will add Baclofen 10 mg TID.  Benefits and risks discussed and benefits outweigh the risks at this time.          Relevant Medications    " baclofen (LIORESAL) 10 MG tablet    Overweight    Current Assessment & Plan   Patient's (Body mass index is 44.3 kg/m².) indicates that they are overweight with health conditions that include diabetes mellitus . Weight is unchanged. BMI is above average; BMI management plan is completed. We discussed low calorie, low carb based diet program, portion control, and increasing exercise.          Pneumothorax on left - Primary    Current Assessment & Plan   Doing well

## 2025-04-22 NOTE — ASSESSMENT & PLAN NOTE
Improving on a scale of 0-10 he is a 5 today.  Discussed decreasing Pain meds to every 6 hours.  Will add Baclofen 10 mg TID.  Benefits and risks discussed and benefits outweigh the risks at this time.

## 2025-04-26 NOTE — ASSESSMENT & PLAN NOTE
Need to consider the use of Eliquis but due to multiple fractures we will hold on this at the present.  He is due to see Dr. Ogden to discuss this.

## 2025-04-28 ENCOUNTER — OFFICE VISIT (OUTPATIENT)
Dept: FAMILY MEDICINE CLINIC | Facility: CLINIC | Age: 65
End: 2025-04-28
Payer: MEDICARE

## 2025-04-28 VITALS
DIASTOLIC BLOOD PRESSURE: 84 MMHG | SYSTOLIC BLOOD PRESSURE: 138 MMHG | RESPIRATION RATE: 14 BRPM | OXYGEN SATURATION: 98 % | BODY MASS INDEX: 42.12 KG/M2 | WEIGHT: 284.4 LBS | HEART RATE: 70 BPM | HEIGHT: 69 IN | TEMPERATURE: 97.1 F

## 2025-04-28 DIAGNOSIS — I48.20 CHRONIC ATRIAL FIBRILLATION: ICD-10-CM

## 2025-04-28 DIAGNOSIS — E66.3 OVERWEIGHT: ICD-10-CM

## 2025-04-28 DIAGNOSIS — S22.49XD CLOSED FRACTURE OF MULTIPLE RIBS WITH ROUTINE HEALING, UNSPECIFIED LATERALITY, SUBSEQUENT ENCOUNTER: Primary | ICD-10-CM

## 2025-04-28 NOTE — ASSESSMENT & PLAN NOTE
Patient's (Body mass index is 42 kg/m².) indicates that they are overweight with health conditions that include diabetes mellitus . Weight is improving with lifestyle modifications. BMI is above average; BMI management plan is completed. We discussed low calorie, low carb based diet program, portion control, and increasing exercise.

## 2025-04-28 NOTE — PROGRESS NOTES
[facility] Cardiology Consult    Admitting Cardiologist:Dr. Natalya Chung    Primary Cardiologist:Dr. Arnol Spicer    Primary Care Physician:Dr. Wilfredo Vergara    Subjective:     Ministerio Chase is a 69 y. o.female with known hx of PAF, CAD with obstructive circumflex lesion unable to be intervened upon in February, Hx of DM. She underwent cardiac cath last month with above findings unable to cross circumflex lesion. She developed atrial fibrillation for she came to ER but spontaneously broke and was sent home. She was on cardizem CD and flecanide until CAD was detected. This was stopped and Multaq was added last Thursday by Dr. Lourdes Pino for continued complaints of palpitations. She was given samples but did not start medications because she had to attend  and was worried about potential diarrhea from medication. She has been compliant with taking her eliquis. She presents today with afib with -170. Denies CP. Denies syncope. Past Medical History:   Diagnosis Date    Arrhythmia     episode A. Fib with tachycardia 2014 (on a cruise) Converted back to normal sinus rhythm with Amiodarone    Arthritis     in hands    Cellulitis     history of cellulitis right foot, leg (hosp.  11) and 2nd episode in 2014 (not hospitalized);turned into ulcer on toe     Depression     Diabetes ([facility] [address] dx     type 2, oral med, avg fbs 130-170, notices s/s hypoglycemia at 59, unsure last HA1C    Diverticulosis of colon (without mention of hemorrhage)     Edema extremities     chronic (treated with daily Furosemide)    Foot ulcer (HCC)     GERD (gastroesophageal reflux disease)     occ. episode with tomato-type products, relieved with rolaids or tums    Hammer toe     Hypercholesterolemia     no medication    Hypertension     controlled with meds    Neuropathy     in bilateral feet; no medication    Obese 14    BMI 39.1    PUD (peptic ulcer disease) 1971    Thromboembolus ([address]     right leg Subjective   Thor Crouch is a 64 y.o. male.   No chief complaint on file.    Rib Injury  Symptoms are recurrent.   Onset was 1 to 4 weeks.   Symptoms occur intermittently.   Symptoms have been improved since onset.   Pertinent negative symptoms include no chills, no cough and no fever.        The following portions of the patient's history were reviewed and updated as appropriate: allergies, current medications, past family history, past medical history, past social history, past surgical history, and problem list.    Past Medical History:   Diagnosis Date    Cataract of both eyes     Other cataract of both eyes; Impression: Stable    Diabetic nephropathy associated with type 2 diabetes mellitus     Impression: Protein normal 06/18, will continue to repeat.    Epiretinal membrane, left     Impression: Followed with Dr. Murcia    History of prostate cancer     Impression: Patient has a follow up appt with Dr. Taylor NP today in [place], discussed with patient about transferring to Dr. Li or Michelle, so he can see them in the [place] office.    Hypertension, benign     Impression: Good control; Encouraged to watch salt, exercise more and lose weight    Left atrial enlargement     Impression: Stable, will follow conservatively    Malignant neoplasm of prostate     Impression: Doing well. Followed with Dr. Washington    Microcytic anemia     Impression: cbc with next labs    Overweight     >25    Seasonal allergic rhinitis due to pollen     Impression: Doing well    Situational stress     Impression: Stable    Skin lesion     Unspecified Skin Lesion; Impression: Will obtain notes from [facility].    Stroke with cerebral ischemia     Impression: Doing well at this time. Will obtain records from Dr. Seipel, regarding medication Plavix.    Type 2 diabetes mellitus with both eyes affected by mild nonproliferative retinopathy without macular edema, without long-term current use of insulin     Impression:  Past Surgical History:   Procedure Laterality Date    ABDOMEN SURGERY PROC UNLISTED  2011    drainage abdominal wall abscess    HX APPENDECTOMY  1968    HX CARPAL TUNNEL RELEASE Right     HX  SECTION      HX LAP CHOLECYSTECTOMY      HX OOPHORECTOMY  early 's    Rt ovary removed. Left ovary intact    HX ORTHOPAEDIC      left foot~pt denies    HX OTHER SURGICAL  2007    abscess drained~pt states no    HX TUBAL LIGATION        Current Facility-Administered Medications   Medication Dose Route Frequency    dilTIAZem (CARDIZEM) 125 mg in dextrose 5% 125 mL infusion  5-15 mg/hr IntraVENous TITRATE    magnesium sulfate 2 g/50 ml IVPB (premix or compounded)  2 g IntraVENous NOW    potassium chloride (K-DUR, KLOR-CON) SR tablet 40 mEq  40 mEq Oral NOW    dilTIAZem (CARDIZEM) injection 10 mg  10 mg IntraVENous ONCE     Current Outpatient Medications   Medication Sig    dilTIAZem CD (CARDIZEM CD) 120 mg ER capsule Take 1 Cap by mouth daily.  metFORMIN (GLUCOPHAGE) 500 mg tablet Take 1 Tab by mouth two (2) times daily (with meals).  dronedarone (MULTAQ) tab tablet Take 400 mg by mouth two (2) times daily (with meals). Samples given    azithromycin (ZITHROMAX Z-BIJAL) 250 mg tablet Take 2 tabs po today then 1 tab po daily    benazepril (LOTENSIN) 20 mg tablet Take 20 mg by mouth daily.  furosemide (LASIX) 20 mg tablet Take 20 mg by mouth daily.  isosorbide mononitrate ER (IMDUR) 30 mg tablet Take 1 Tab by mouth daily.  atorvastatin (LIPITOR) 10 mg tablet Take 1 Tab by mouth daily.  citalopram (CELEXA) 20 mg tablet Take 20 mg by mouth daily.  BIOTIN PO Take 500 mg by mouth daily.  OTHER Berbarine 500mg daily    apixaban (ELIQUIS) 5 mg tablet Take 1 Tab by mouth two (2) times a day.  therapeutic multivitamin (THERAGRAN) tablet Take 1 Tab by mouth daily.     glucose blood VI test strips (CONTOUR NEXT STRIPS) strip Test blood sugars once daily    FISH Uncertain control; Advised patient to start monitoring blood sugar at home since taking OTC cough/cold medications.    Vitamin B12 deficiency     Impression: Patient was supposed to recieve a B12 injection, however patient left before it was done. Patient was called to come back to get this injection.       Past Surgical History:   Procedure Laterality Date    PROSTATE SURGERY          Family History   Problem Relation Age of Onset    Heart disease Mother         ischemic    Arthritis Mother     Goiter Father     Heart disease Father         ischemic    Heart attack Father     Diabetes Sister         diabetes mellitus    Diabetes Brother         diabetes mellitus    Diabetes Maternal Uncle         diabetes mellitus    Diabetes Paternal Grandmother         diabetes mellitus    Stroke Paternal Grandfather     Kidney failure Other         Uncle    Arthritis Other         grandmother        Social History     Socioeconomic History    Marital status:    Tobacco Use    Smoking status: Never     Passive exposure: Never    Smokeless tobacco: Never   Vaping Use    Vaping status: Never Used   Substance and Sexual Activity    Alcohol use: No    Drug use: No    Sexual activity: Defer       Outpatient Medications Prior to Visit   Medication Sig Dispense Refill    Accu-Chek FastClix Lancets misc CHECK BLOOD SUGAR THREE TIMES DAILY 102 each 5    citalopram (CeleXA) 10 MG tablet Take 1 tablet by mouth Daily. 90 tablet 3    clopidogrel (PLAVIX) 75 MG tablet TAKE 1 TABLET BY MOUTH EVERY DAY 90 tablet 0    Continuous Glucose  (Dexcom G7 ) device Use 1 Device Every 10 (Ten) Days. 3 each 12    Continuous Glucose Sensor (Dexcom G7 Sensor) misc Use 1 Device Every 10 (Ten) Days. 3 each 3    dilTIAZem (CARDIZEM) 120 MG tablet Take 1 tablet by mouth 4 (Four) Times a Day.      glucose blood (Accu-Chek Guide) test strip USE THREE TIMES DAILY 300 each 3    glucose blood (Accu-Chek Guide) test strip USE THREE TIMES DAILY  "300 each 2    Insulin Degludec FlexTouch 200 UNIT/ML solution pen-injector INJECT 67 UNITS UNDER THE SKIN INTO THE APPROPRIATE AREA AS DIRECTED DAILY 9 mL 0    insulin regular (NovoLIN R) 100 UNIT/ML injection Inject 50 Units under the skin into the appropriate area as directed 2 (Two) Times a Day Before Meals. Take within 30 minutes of beginning meal. 6 each 5    Insulin Syringe-Needle U-100 (ReliOn Insulin Syringe) 31G X 15/64\" 1 ML misc Inject 1 syringe under the skin into the appropriate area as directed 2 (Two) Times a Day. 100 each 5    lovastatin (MEVACOR) 40 MG tablet Take 1 tablet by mouth Every Evening. 90 tablet 3    metoprolol succinate XL (TOPROL-XL) 50 MG 24 hr tablet Take 1 tablet by mouth Daily. 90 tablet 3    ReliOn Insulin Syringe 31G X 15/64\" 1 ML misc Inject 80 Units under the skin into the appropriate area as directed 3 (Three) Times a Day. (Patient not taking: Reported on 4/28/2025) 100 each 12    amLODIPine (NORVASC) 10 MG tablet Take 1 tablet by mouth Daily. (Patient not taking: Reported on 4/15/2025) 90 tablet 3    azithromycin (ZITHROMAX) 500 MG tablet Take 1 tablet by mouth Daily. (Patient not taking: Reported on 4/28/2025)      baclofen (LIORESAL) 10 MG tablet Take 1 tablet by mouth 3 (Three) Times a Day. (Patient not taking: Reported on 4/28/2025) 90 tablet 0    fluticasone (Flonase Allergy Relief) 50 MCG/ACT nasal spray 2 sprays by Each Nare route Daily. Shake well before using.      lisinopril (PRINIVIL,ZESTRIL) 20 MG tablet Take 1 tablet by mouth 2 (Two) Times a Day. (Patient not taking: Reported on 4/28/2025) 180 tablet 3    loratadine (Claritin) 10 MG tablet Take 1 tablet by mouth Daily. (Patient not taking: Reported on 4/28/2025)      oxyCODONE (Roxicodone) 5 MG immediate release tablet Take 1 tablet by mouth Every 4 (Four) Hours As Needed for Moderate Pain. (Patient not taking: Reported on 4/28/2025) 50 tablet 0    pantoprazole (PROTONIX) 40 MG EC tablet Take 1 tablet by mouth " OIL/BORAGE/FLAX/OM3,6,9#1 (OMEGA 3-6-9 COMPLEX PO) Take 1 Tab by mouth daily.  b complex vitamins tablet Take 1 Tab by mouth daily.  cholecalciferol, VITAMIN D3, (VITAMIN D3) 5,000 unit tab tablet Take 5,000 Units by mouth every morning.  ASCORBATE CALCIUM (VITAMIN C PO) Take 1 Tab by mouth every morning. Allergies   Allergen Reactions    Amoxicillin Rash and Other (comments)     Made her feel \"weird\"    Doxycycline Nausea and Vomiting    Flecainide Palpitations    Pravastatin Other (comments)     Body aches      Social History     Tobacco Use    Smoking status: Never Smoker    Smokeless tobacco: Never Used   Substance Use Topics    Alcohol use: No      Family History   Problem Relation Age of Onset    Colon Cancer Mother     Cancer Father         lung (smoker)    Hypertension Brother     Heart Disease Brother     Heart Attack Brother     Stroke Brother     Cancer Other         mother - colon        Review of Systems  Gen: Denies fever, chills, malaise or fatigue. Appetite good. HEENT: Denies frequent headaches, dizzyness, visual disturbances, Neck pain or swallowing difficulty  Lungs: Denies shortness of breath, hx of COPD, breathing problems  Cardiovascular: as above   GI: Denies hememesis, dark tarry stools, No prior Hx of GI bleed, Denies constipation  : Denies dysuria, no complaints of frequency, nocturia  Heme: No prior bleeding disorders, no prior Cancer  Neuro: Denies prior CVA, TIA. Endocrine:+DM   Psychiatric: Denies anxiety, or other psychiatric illnesses. Objective:     Visit Vitals  /72   Pulse (!) 161   Temp 98.2 °F (36.8 °C)   Resp 16   Ht 5' 2\" (1.575 m)   Wt 88.5 kg (195 lb)   SpO2 93%   BMI 35.67 kg/m²     General:Alert, cooperative, no distress, appears stated age  Head: Normocephalic, without obvious abnormality, atraumatic. Eyes: Conjunctivae/corneas clear. PERRL, EOMs intact  Nose:Nares normal. Septum midline.  Mucosa normal. No drainage or sinus "Daily. (Patient not taking: Reported on 4/28/2025)      senna 8.6 MG tablet Take 1 tablet by mouth Daily. (Patient not taking: Reported on 4/28/2025)       Facility-Administered Medications Prior to Visit   Medication Dose Route Frequency Provider Last Rate Last Admin    cyanocobalamin injection 1,000 mcg  1,000 mcg Intramuscular Q28 Days Hayes Sharp MD   1,000 mcg at 07/22/20 1727    cyanocobalamin injection 1,000 mcg  1,000 mcg Intramuscular Q28 Days Hayes Sharp MD   1,000 mcg at 11/02/20 1725        Review of Systems   Constitutional:  Negative for chills and fever.   Respiratory:  Negative for cough and shortness of breath.        Objective   Visit Vitals  /84 (BP Location: Left arm, Patient Position: Sitting, Cuff Size: Adult)   Pulse 70   Temp 97.1 °F (36.2 °C)   Resp 14   Ht 175.3 cm (69\")   Wt 129 kg (284 lb 6.4 oz)   SpO2 98% Comment: 2L Oxygen   BMI 42.00 kg/m²     Physical Exam  Vitals and nursing note reviewed.   Constitutional:       Appearance: He is well-developed.   HENT:      Head: Normocephalic.   Neck:      Thyroid: No thyromegaly.      Vascular: No carotid bruit.      Trachea: Trachea normal.   Cardiovascular:      Rate and Rhythm: Normal rate and regular rhythm.      Heart sounds: No murmur heard.     No friction rub. No gallop.   Pulmonary:      Effort: Pulmonary effort is normal. No respiratory distress.      Breath sounds: Normal breath sounds. No wheezing.   Chest:      Chest wall: No tenderness.   Musculoskeletal:      Cervical back: Neck supple.      Comments: Ribs  tender   Skin:     General: Skin is dry.      Findings: No rash.      Nails: There is no clubbing.   Neurological:      Mental Status: He is alert and oriented to person, place, and time.   Psychiatric:         Behavior: Behavior is cooperative.         Assessment & Plan   Problem List Items Addressed This Visit          High    Chronic atrial fibrillation    Current Assessment & Plan   Need to consider the " tenderness. Throat: Lips, mucosa, and tongue normal. Teeth and gums normal.   Neck: Supple, symmetrical, trachea midline,  no carotid bruit and no JVD. Lungs:Clear to auscultation bilaterally. Chest wall: No tenderness or deformity. Heart: tachycardic, irregular   Abdomen:Soft, non-tender. Bowel sounds normal. No masses, No organomegaly. Extremities: Extremities normal, atraumatic, no cyanosis or edema. Pulses: 2+ and symmetric all extremities. Skin: Skin color, texture, turgor normal. No rashes or lesions  Lymph nodes: Cervical, supraclavicular, and axillary nodes normal  Neurologic:No focal deficits identified                 ECG: afib with RVR     Data Review:     Recent Results (from the past 24 hour(s))   EKG, 12 LEAD, INITIAL    Collection Time: 03/18/19 12:58 PM   Result Value Ref Range    Ventricular Rate 151 BPM    Atrial Rate 174 BPM    QRS Duration 88 ms    Q-T Interval 286 ms    QTC Calculation (Bezet) 453 ms    Calculated R Axis -24 degrees    Calculated T Axis 147 degrees    Diagnosis       !! AGE AND GENDER SPECIFIC ECG ANALYSIS !!   Atrial fibrillation with rapid ventricular response with premature   ventricular or aberrantly conducted complexes  Marked ST abnormality, possible inferior subendocardial injury  Abnormal ECG  When compared with ECG of 27-FEB-2019 07:50,  Significant changes have occurred     CBC WITH AUTOMATED DIFF    Collection Time: 03/18/19  1:03 PM   Result Value Ref Range    WBC 11.9 (H) 4.3 - 11.1 K/uL    RBC 4.70 4.05 - 5.2 M/uL    HGB 14.8 11.7 - 15.4 g/dL    HCT 43.7 35.8 - 46.3 %    MCV 93.0 79.6 - 97.8 FL    MCH 31.5 26.1 - 32.9 PG    MCHC 33.9 31.4 - 35.0 g/dL    RDW 13.2 11.9 - 14.6 %    PLATELET 326 131 - 014 K/uL    MPV 9.8 9.4 - 12.3 FL    ABSOLUTE NRBC 0.00 0.0 - 0.2 K/uL    DF AUTOMATED      NEUTROPHILS 60 43 - 78 %    LYMPHOCYTES 32 13 - 44 %    MONOCYTES 7 4.0 - 12.0 %    EOSINOPHILS 1 0.5 - 7.8 %    BASOPHILS 0 0.0 - 2.0 %    IMMATURE GRANULOCYTES 0 0.0 - 5.0 %    ABS. NEUTROPHILS 7.1 1.7 - 8.2 K/UL    ABS. LYMPHOCYTES 3.8 0.5 - 4.6 K/UL    ABS. MONOCYTES 0.8 0.1 - 1.3 K/UL    ABS. EOSINOPHILS 0.1 0.0 - 0.8 K/UL    ABS. BASOPHILS 0.1 0.0 - 0.2 K/UL    ABS. IMM. GRANS. 0.1 0.0 - 0.5 K/UL   METABOLIC PANEL, COMPREHENSIVE    Collection Time: 03/18/19  1:03 PM   Result Value Ref Range    Sodium 140 136 - 145 mmol/L    Potassium 3.2 (L) 3.5 - 5.1 mmol/L    Chloride 101 98 - 107 mmol/L    CO2 30 21 - 32 mmol/L    Anion gap 9 7 - 16 mmol/L    Glucose 100 65 - 100 mg/dL    BUN 21 8 - 23 MG/DL    Creatinine 0.77 0.6 - 1.0 MG/DL    GFR est AA >60 >60 ml/min/1.73m2    GFR est non-AA >60 >60 ml/min/1.73m2    Calcium 9.1 8.3 - 10.4 MG/DL    Bilirubin, total 0.8 0.2 - 1.1 MG/DL    ALT (SGPT) 21 12 - 65 U/L    AST (SGOT) 18 15 - 37 U/L    Alk. phosphatase 68 50 - 136 U/L    Protein, total 7.9 6.3 - 8.2 g/dL    Albumin 3.7 3.2 - 4.6 g/dL    Globulin 4.2 (H) 2.3 - 3.5 g/dL    A-G Ratio 0.9 (L) 1.2 - 3.5           Assessment / Plan     Principal Problem:    Atrial fibrillation (HCC) (2/3/2019)--with RVR, will admit to telemetry for rate control. IV cardizem, may require additional antiarrhythmia agent. Continue eliquis --will need OUMAR/ CV if she does not spontaneously convert. Active Problems:    Diabetes ([address] (8/19/2011)--controlled, continue home meds       HTN (hypertension) (8/19/2011)--monitor, titrate meds as needed. CAD (coronary artery disease) (3/18/2019)--asa, statin, will add low dose metoprolol. Addendum--pt had spontaneous conversion again in ER prior to being admitted with IV cardizem. She was offered admission and possible initiation of sotalol, however she would like to try to initiate multaq therapy--that she did not start. She has been advised to stop Zithromax and avoidance of other QT prolonging medications.      Eugenia Aguirre, NP use of Eliquis but due to multiple fractures we will hold on this at the present. He is due to see Dr. Ogden to discuss this.             Unprioritized    Fracture of multiple ribs - Primary    Overview   7 rib fractures         Current Assessment & Plan   Improving--On a scale of 0-10 he is a 4 today.  Advised patient to gradually increase activity. Continue to deep breathe and cough 3-4 times an hour.  Patient is currently off all pain medications and muscle relaxers. Taking tylenol as needed.  He may also restart baclofen if needed but to try and stay off the oxycodone.         Overweight    Current Assessment & Plan   Patient's (Body mass index is 42 kg/m².) indicates that they are overweight with health conditions that include diabetes mellitus . Weight is improving with lifestyle modifications. BMI is above average; BMI management plan is completed. We discussed low calorie, low carb based diet program, portion control, and increasing exercise.

## 2025-05-06 DIAGNOSIS — I48.91 ATRIAL FIBRILLATION, UNSPECIFIED TYPE: Primary | ICD-10-CM

## 2025-05-07 ENCOUNTER — TELEPHONE (OUTPATIENT)
Dept: FAMILY MEDICINE CLINIC | Facility: CLINIC | Age: 65
End: 2025-05-07
Payer: MEDICARE

## 2025-05-07 RX ORDER — DILTIAZEM HYDROCHLORIDE 120 MG/1
120 TABLET, FILM COATED ORAL DAILY
Qty: 30 TABLET | Refills: 1 | Status: SHIPPED | OUTPATIENT
Start: 2025-05-07

## 2025-05-07 NOTE — TELEPHONE ENCOUNTER
Caller: JORDI ART    Relationship: Emergency Contact    Best call back number: [phone]     What orders are you requesting (i.e. lab or imaging): ORDER'S FOR THE [employer] TO GO  EQUIPMENT---   [employer] [facility]        Additional notes: PATIENT CONTACTED [employer] TO COME  THE OXYGEN EQUIPMENT AND THEY TOLD THEM THEY CAN NOT  EQUIPMENT WITH OUT DOCTOR'S ORDERS.      PLEASE ADVISE

## 2025-05-13 DIAGNOSIS — E11.3293 TYPE 2 DIABETES MELLITUS WITH BOTH EYES AFFECTED BY MILD NONPROLIFERATIVE RETINOPATHY WITHOUT MACULAR EDEMA, WITHOUT LONG-TERM CURRENT USE OF INSULIN: ICD-10-CM

## 2025-05-13 RX ORDER — INSULIN DEGLUDEC 200 U/ML
INJECTION, SOLUTION SUBCUTANEOUS
Qty: 9 ML | Refills: 0 | Status: SHIPPED | OUTPATIENT
Start: 2025-05-13

## 2025-05-13 NOTE — PROGRESS NOTES
Chief Complaint  Sleep Apnea    Subjective          Thor Crouch presents to [facility] NEUROLOGY  History of Present Illness    Thor Crouch is a 64-year-old male seen today for initial compliance for DONNY.  He was seen by Dr. Seipel 2/6/2025 at which time he was having issues with his replacement machine that he received from the [employer].  Most recent sleep study was done 10/12/2018 which showed moderate DONNY with an overall AHI of 17.2/hour.  He was set up with new PAP therapy through [facility] on 2/18/2025.    Patient states that he had a mowing accident at the beginning of April and broke 7 ribs.  Because of this he is unable to sleep in his normal sleeping position which is causing a lot of mask leaking.  He is currently using a nasal cushion and wakes up frequently with his mouth dry.  Otherwise patient is doing well      SLEEP TESTING HISTORY:    On NPSG at [facility] , 10/12/2018 patient had Moderate obstructive sleep apnea syndrome with apnea-hypopnea index of 17.2 per sleep hour, minimum SpO2 of 82%    PAP download ([facility] 2/18/2025 - 5/12/2025):  The patient is on auto CPAP therapy at 9-14 cm/H2O.   Data indicates Excellent compliance. With 95% usage for more than 4 hours with an average usage of 11 hours 36 minutes. AHI down to 2.5 .  Average pressures 12.1 centimeters H2O.  Average  leak 29.3 LPM.     The patient's hypersomnia has resolved       [place] Sleepiness Scale:  Sitting and reading JS [place] Sleepiness: 0 WatchingTV JS [place] Sleepiness: 0  Sitting, inactive, in a public place JS [place] Sleepiness: 0  As a passenger in a car for 1 hour w/o a break  JS [place] Sleepiness: 0  Lying down to rest in the afternoon JS [place] Sleepiness: 0  Sitting and talking to someone  JS [place] Sleepiness: 0  Sitting quietly after a lunch  JS [place] Sleepiness: 0  In a car, while stopped for traffic or a light  JS [place] Sleepiness: 0  Total 0    Review of Systems   Respiratory:   "Positive for apnea.    Musculoskeletal:         Rib pain due to 7 fractured ribs from a mower accident      Objective   Vital Signs:   /71   Pulse 81   Ht 175.3 cm (69\")   Wt 130 kg (287 lb)   BMI 42.38 kg/m²     Physical Exam  Vitals reviewed.   Constitutional:       Appearance: He is well-developed. He is morbidly obese.   HENT:      Head: Normocephalic and atraumatic.      Comments: MMP 4, enlarged tongue base     Mouth/Throat:      Dentition: Has dentures.   Eyes:      Extraocular Movements: Extraocular movements intact.      Pupils: Pupils are equal, round, and reactive to light.   Neck:      Vascular: No carotid bruit.   Cardiovascular:      Rate and Rhythm: Normal rate.      Heart sounds: No murmur heard.  Pulmonary:      Effort: Pulmonary effort is normal.   Musculoskeletal:      Cervical back: Normal range of motion and neck supple.   Skin:     General: Skin is warm and dry.   Neurological:      Mental Status: He is alert and oriented to person, place, and time.      Cranial Nerves: Cranial nerves 2-12 are intact. No cranial nerve deficit.      Motor: Motor function is intact. No tremor.      Coordination: Finger-Nose-Finger Test normal. Rapid alternating movements normal.      Gait: Gait is intact. Gait normal.   Psychiatric:         Attention and Perception: Attention normal.         Mood and Affect: Mood normal.         Speech: Speech normal.         Behavior: Behavior normal.         Cognition and Memory: Cognition and memory normal.         Judgment: Judgment normal.        Result Review :                 Assessment and Plan    Diagnoses and all orders for this visit:    1. Obstructive sleep apnea (Primary)  Overview:  Followed with Dr. Seiple    Orders:  -     PAP Therapy    2. Class 3 severe obesity due to excess calories with serious comorbidity and body mass index (BMI) of 40.0 to 44.9 in adult    Thor Crouch is seen today in follow-up for initial PAP compliance.  Patient has been using " PAP therapy for many years and had a sleep study in 2018 which showed moderate DONNY.  He received a new PAP device through [facility] on 2/18/2025 and he is compliant with PAP therapy 95% of the time over the last 84 days over 4 hours.  It is effective in treating his sleep apnea with an overall AHI of 2.5.    Upon reviewing compliance he had detailed report, the patient does have significant mask leaking since he fractured his ribs in April.  Prior to this fracture mask and mild leaking were not bothersome to the patient.  I encouraged the patient to contact [facility] while he must rule remain in the certain sleep positions because of the fractures for them to switch out his mask to a nasal pillow.  He does have some mild mouth leaking which may improve with a chinstrap.     Otherwise he will continue to wear PAP therapy over 4 hours every night.  I encouraged him to focus on weight loss as this can improve the severity of his DONNY.    He will follow-up in 1 year    The patient is compliant with and benefiting from PAP therapy.      Follow Up   Return in about 1 year (around 5/14/2026).    Patient was given instructions and counseling regarding his condition or for health maintenance advice. Please see specific information pulled into the AVS if appropriate.       This document has been electronically signed by JAREK Montes on May 15, 2025 07:26 EDT     You can access the FollowMyHealth Patient Portal offered by [facility] by registering at the following website: [email or web address]. By joining [facility]’s FollowMyHealth portal, you will also be able to view your health information using other applications (apps) compatible with our system.

## 2025-05-14 ENCOUNTER — OFFICE VISIT (OUTPATIENT)
Dept: NEUROLOGY | Facility: CLINIC | Age: 65
End: 2025-05-14
Payer: MEDICARE

## 2025-05-14 VITALS
HEIGHT: 69 IN | SYSTOLIC BLOOD PRESSURE: 165 MMHG | WEIGHT: 287 LBS | DIASTOLIC BLOOD PRESSURE: 71 MMHG | HEART RATE: 81 BPM | BODY MASS INDEX: 42.51 KG/M2

## 2025-05-14 DIAGNOSIS — E66.01 CLASS 3 SEVERE OBESITY DUE TO EXCESS CALORIES WITH SERIOUS COMORBIDITY AND BODY MASS INDEX (BMI) OF 40.0 TO 44.9 IN ADULT: ICD-10-CM

## 2025-05-14 DIAGNOSIS — E66.813 CLASS 3 SEVERE OBESITY DUE TO EXCESS CALORIES WITH SERIOUS COMORBIDITY AND BODY MASS INDEX (BMI) OF 40.0 TO 44.9 IN ADULT: ICD-10-CM

## 2025-05-14 DIAGNOSIS — G47.33 OBSTRUCTIVE SLEEP APNEA: Primary | ICD-10-CM

## 2025-05-15 PROBLEM — E66.01 CLASS 3 SEVERE OBESITY WITH BODY MASS INDEX (BMI) OF 40.0 TO 44.9 IN ADULT: Status: ACTIVE | Noted: 2019-07-01

## 2025-05-15 PROBLEM — E66.813 CLASS 3 SEVERE OBESITY WITH BODY MASS INDEX (BMI) OF 40.0 TO 44.9 IN ADULT: Status: ACTIVE | Noted: 2019-07-01

## 2025-05-15 NOTE — PROGRESS NOTES
Date of Office Visit: 2025  Encounter Provider: Dr. Kris Ogden  Place of Service: [facility] CARDIOLOGY [place]  Patient Name: Thor Crouch  :1960  Hayes Sharp MD    Chief Complaint   Patient presents with    Atrial Fibrillation    Consult    Hypertension     History of Present Illness:    I am pleased to see Mr. Crouch in my office today as a new consultation.    As you know, patient is 64-year-old white gentleman whose past medical history significant for hypertension, hyperlipidemia, diabetes mellitus, obesity, who is referred to me for possible paroxysmal atrial fibrillation and shortness of breath.    In 2025, patient had an accident while mowing his yard.  He fell off the retaining wall in his lawn.  Patient sustained injury to the left rib and broke multiple ribs.  Patient was admitted in the hospital.  During the hospital stay he was noted to have paroxysmal atrial fibrillation.  Because of recent injury he was not started on anticoagulation.  Patient was started on diltiazem 120 mg daily.    Patient still is tender and painful on the left side of the ribs.  Patient is maintaining sinus rhythm.  Patient does have mild shortness of breath.  Patient denies any retrosternal pain.  No orthopnea PND no leg edema noted.    Patient does not have previous history of CAD, PCI or MI.  Patient does not smoke or abuse alcohol.  Patient uses CPAP for obstructive sleep apnea    I would recommend that patient should proceed with MCOT to see whether how much is atrial fibrillation burden.  Continue diltiazem I would increase diltiazem to 180 mg long-acting preparation.  I would also consider echocardiogram and stress test in future but at present patient is stable when tender and painful.  These test will be done in future        Past Medical History:   Diagnosis Date    Atrial fibrillation     Cataract of both eyes     Other cataract of both eyes; Impression: Stable    Diabetic nephropathy  associated with type 2 diabetes mellitus     Impression: Protein normal 06/18, will continue to repeat.    Epiretinal membrane, left     Impression: Followed with Dr. Murcia    History of prostate cancer     Impression: Patient has a follow up appt with Dr. Taylor NP today in [place], discussed with patient about transferring to Dr. Li or Michelle, so he can see them in the [place] office.    Hypertension, benign     Impression: Good control; Encouraged to watch salt, exercise more and lose weight    Left atrial enlargement     Impression: Stable, will follow conservatively    Malignant neoplasm of prostate     Impression: Doing well. Followed with Dr. Washington    Microcytic anemia     Impression: cbc with next labs    Overweight     >25    Seasonal allergic rhinitis due to pollen     Impression: Doing well    Situational stress     Impression: Stable    Skin lesion     Unspecified Skin Lesion; Impression: Will obtain notes from [facility].    Stroke with cerebral ischemia     Impression: Doing well at this time. Will obtain records from Dr. Seipel, regarding medication Plavix.    Type 2 diabetes mellitus with both eyes affected by mild nonproliferative retinopathy without macular edema, without long-term current use of insulin     Impression: Uncertain control; Advised patient to start monitoring blood sugar at home since taking OTC cough/cold medications.    Vitamin B12 deficiency     Impression: Patient was supposed to recieve a B12 injection, however patient left before it was done. Patient was called to come back to get this injection.         Past Surgical History:   Procedure Laterality Date    PROSTATE SURGERY             Current Outpatient Medications:     Accu-Chek FastClix Lancets misc, CHECK BLOOD SUGAR THREE TIMES DAILY, Disp: 102 each, Rfl: 5    citalopram (CeleXA) 10 MG tablet, Take 1 tablet by mouth Daily., Disp: 90 tablet, Rfl: 3    clopidogrel (PLAVIX) 75 MG tablet, TAKE 1  "TABLET BY MOUTH EVERY DAY, Disp: 90 tablet, Rfl: 0    Continuous Glucose  (Dexcom G7 ) device, Use 1 Device Every 10 (Ten) Days., Disp: 3 each, Rfl: 12    Continuous Glucose Sensor (Dexcom G7 Sensor) misc, Use 1 Device Every 10 (Ten) Days., Disp: 3 each, Rfl: 3    glucose blood (Accu-Chek Guide) test strip, USE THREE TIMES DAILY, Disp: 300 each, Rfl: 3    glucose blood (Accu-Chek Guide) test strip, USE THREE TIMES DAILY, Disp: 300 each, Rfl: 2    Insulin Degludec FlexTouch 200 UNIT/ML solution pen-injector, INJECT 67 UNITS UNDER THE SKIN INTO THE APPROPRIATE AREA AS DIRECTED DAILY, Disp: 9 mL, Rfl: 0    insulin regular (NovoLIN R) 100 UNIT/ML injection, Inject 50 Units under the skin into the appropriate area as directed 2 (Two) Times a Day Before Meals. Take within 30 minutes of beginning meal., Disp: 6 each, Rfl: 5    Insulin Syringe-Needle U-100 (ReliOn Insulin Syringe) 31G X 15/64\" 1 ML misc, Inject 1 syringe under the skin into the appropriate area as directed 2 (Two) Times a Day., Disp: 100 each, Rfl: 5    lisinopril (PRINIVIL,ZESTRIL) 20 MG tablet, Take 1 tablet by mouth 2 (Two) Times a Day., Disp: , Rfl:     lovastatin (MEVACOR) 40 MG tablet, Take 1 tablet by mouth Every Evening., Disp: 90 tablet, Rfl: 3    metoprolol succinate XL (TOPROL-XL) 50 MG 24 hr tablet, Take 1 tablet by mouth Daily., Disp: 90 tablet, Rfl: 3    ReliOn Insulin Syringe 31G X 15/64\" 1 ML misc, Inject 80 Units under the skin into the appropriate area as directed 3 (Three) Times a Day., Disp: 100 each, Rfl: 12    dilTIAZem CD (Cardizem CD) 180 MG 24 hr capsule, Take 1 capsule by mouth Daily., Disp: 90 capsule, Rfl: 1  No current facility-administered medications for this visit.      Social History     Socioeconomic History    Marital status:    Tobacco Use    Smoking status: Never     Passive exposure: Never    Smokeless tobacco: Never   Vaping Use    Vaping status: Never Used   Substance and Sexual Activity    " "Alcohol use: No    Drug use: No    Sexual activity: Defer         Review of Systems   Constitutional: Negative for chills and fever.   HENT:  Negative for ear discharge and nosebleeds.    Eyes:  Negative for discharge and redness.   Cardiovascular:  Positive for chest pain. Negative for orthopnea, palpitations, paroxysmal nocturnal dyspnea and syncope.   Respiratory:  Positive for shortness of breath. Negative for cough and wheezing.    Endocrine: Negative for heat intolerance.   Skin:  Negative for rash.   Musculoskeletal:  Negative for arthritis and myalgias.   Gastrointestinal:  Negative for abdominal pain, melena, nausea and vomiting.   Genitourinary:  Negative for dysuria and hematuria.   Neurological:  Negative for dizziness, light-headedness, numbness and tremors.   Psychiatric/Behavioral:  Negative for depression. The patient is not nervous/anxious.        Procedures      ECG 12 Lead    Date/Time: 5/16/2025 12:22 PM  Performed by: Kirs Ogden MD    Authorized by: Kris Ogden MD  Previous ECG: no previous ECG available  Rhythm: sinus rhythm    Clinical impression: normal ECG          ECG 12 Lead    (Results Pending)           Objective:    BP (!) 186/94 (BP Location: Right arm, Patient Position: Sitting, Cuff Size: Large Adult)   Pulse 71   Resp 18   Ht 175 cm (68.9\")   Wt 129 kg (285 lb)   SpO2 96%   BMI 42.21 kg/m²         Constitutional:       Appearance: Well-developed.   Eyes:      General: No scleral icterus.        Right eye: No discharge.   HENT:      Head: Normocephalic and atraumatic.   Neck:      Thyroid: No thyromegaly.      Lymphadenopathy: No cervical adenopathy.   Pulmonary:      Effort: Pulmonary effort is normal. No respiratory distress.      Breath sounds: Normal breath sounds. No wheezing. No rales.   Cardiovascular:      Normal rate. Regular rhythm.      No gallop.    Edema:     Peripheral edema absent.   Abdominal:      Tenderness: There is no abdominal tenderness.   Skin:     " Findings: No erythema or rash.   Neurological:      Mental Status: Alert and oriented to person, place, and time.             Assessment:       Diagnosis Plan   1. Hypertension, benign  ECG 12 Lead    dilTIAZem CD (Cardizem CD) 180 MG 24 hr capsule    Cardiac Event Monitor (ALVARO) or Mobile Cardiac Outpatient Telemetry (MCT)      2. Mixed hyperlipidemia  ECG 12 Lead    dilTIAZem CD (Cardizem CD) 180 MG 24 hr capsule    Cardiac Event Monitor (ALVARO) or Mobile Cardiac Outpatient Telemetry (MCT)      3. Paroxysmal atrial fibrillation  ECG 12 Lead    dilTIAZem CD (Cardizem CD) 180 MG 24 hr capsule    Cardiac Event Monitor (ALVARO) or Mobile Cardiac Outpatient Telemetry (MCT)      4. Type 2 diabetes mellitus with both eyes affected by mild nonproliferative retinopathy without macular edema, without long-term current use of insulin  dilTIAZem CD (Cardizem CD) 180 MG 24 hr capsule    Cardiac Event Monitor (ALVARO) or Mobile Cardiac Outpatient Telemetry (MCT)      5. Obstructive sleep apnea  dilTIAZem CD (Cardizem CD) 180 MG 24 hr capsule    Cardiac Event Monitor (ALVARO) or Mobile Cardiac Outpatient Telemetry (MCT)               Plan:       MDM:    1.  Possible paroxysmal atrial fibrillation:    I do not have the documentation or EKG available from [facility].  At present patient is in sinus.  Because of recent rib cage injury I would continue to hold anticoagulation with Eliquis or Xarelto.  Continues Plavix.  Consider MCOT for assessment of atrial fibrillation burden    2.  Chest pain:    Most likely it is due to recent injury.  However patient has risk factor he should have a stress test in future    3.  Hypertension:    Increase diltiazem CD to 180 mg daily    4.  Mixed hyperlipidemia:    Patient is on lovastatin    5.  Obstructive sleep apnea:    Patient is on CPAP

## 2025-05-16 ENCOUNTER — OFFICE VISIT (OUTPATIENT)
Dept: CARDIOLOGY | Facility: CLINIC | Age: 65
End: 2025-05-16
Payer: MEDICARE

## 2025-05-16 VITALS
HEIGHT: 69 IN | HEART RATE: 71 BPM | SYSTOLIC BLOOD PRESSURE: 186 MMHG | WEIGHT: 285 LBS | OXYGEN SATURATION: 96 % | BODY MASS INDEX: 42.21 KG/M2 | DIASTOLIC BLOOD PRESSURE: 94 MMHG | RESPIRATION RATE: 18 BRPM

## 2025-05-16 DIAGNOSIS — I48.0 PAROXYSMAL ATRIAL FIBRILLATION: ICD-10-CM

## 2025-05-16 DIAGNOSIS — G47.33 OBSTRUCTIVE SLEEP APNEA: ICD-10-CM

## 2025-05-16 DIAGNOSIS — E11.3293 TYPE 2 DIABETES MELLITUS WITH BOTH EYES AFFECTED BY MILD NONPROLIFERATIVE RETINOPATHY WITHOUT MACULAR EDEMA, WITHOUT LONG-TERM CURRENT USE OF INSULIN: ICD-10-CM

## 2025-05-16 DIAGNOSIS — I10 HYPERTENSION, BENIGN: Primary | ICD-10-CM

## 2025-05-16 DIAGNOSIS — E78.2 MIXED HYPERLIPIDEMIA: ICD-10-CM

## 2025-05-16 PROCEDURE — 3080F DIAST BP >= 90 MM HG: CPT | Performed by: INTERNAL MEDICINE

## 2025-05-16 PROCEDURE — 99204 OFFICE O/P NEW MOD 45 MIN: CPT | Performed by: INTERNAL MEDICINE

## 2025-05-16 PROCEDURE — 93000 ELECTROCARDIOGRAM COMPLETE: CPT | Performed by: INTERNAL MEDICINE

## 2025-05-16 PROCEDURE — 3077F SYST BP >= 140 MM HG: CPT | Performed by: INTERNAL MEDICINE

## 2025-05-16 PROCEDURE — 1160F RVW MEDS BY RX/DR IN RCRD: CPT | Performed by: INTERNAL MEDICINE

## 2025-05-16 PROCEDURE — 1159F MED LIST DOCD IN RCRD: CPT | Performed by: INTERNAL MEDICINE

## 2025-05-16 RX ORDER — LISINOPRIL 20 MG/1
20 TABLET ORAL 2 TIMES DAILY
COMMUNITY

## 2025-05-16 RX ORDER — DILTIAZEM HYDROCHLORIDE 180 MG/1
180 CAPSULE, COATED, EXTENDED RELEASE ORAL DAILY
Qty: 90 CAPSULE | Refills: 1 | Status: SHIPPED | OUTPATIENT
Start: 2025-05-16

## 2025-05-17 LAB
ALBUMIN SERPL-MCNC: 4.2 G/DL (ref 3.9–4.9)
ALBUMIN/CREAT UR: 548 MG/G CREAT (ref 0–29)
ALP SERPL-CCNC: 159 IU/L (ref 44–121)
ALT SERPL-CCNC: 19 IU/L (ref 0–44)
AST SERPL-CCNC: 19 IU/L (ref 0–40)
BILIRUB SERPL-MCNC: 0.4 MG/DL (ref 0–1.2)
BUN SERPL-MCNC: 17 MG/DL (ref 8–27)
BUN/CREAT SERPL: 15 (ref 10–24)
CALCIUM SERPL-MCNC: 9.4 MG/DL (ref 8.6–10.2)
CHLORIDE SERPL-SCNC: 102 MMOL/L (ref 96–106)
CHOLEST SERPL-MCNC: 150 MG/DL (ref 100–199)
CHOLEST/HDLC SERPL: 4.1 RATIO (ref 0–5)
CO2 SERPL-SCNC: 22 MMOL/L (ref 20–29)
CREAT SERPL-MCNC: 1.12 MG/DL (ref 0.76–1.27)
CREAT UR-MCNC: 273.8 MG/DL
CRP SERPL-MCNC: 9 MG/L (ref 0–10)
EGFRCR SERPLBLD CKD-EPI 2021: 72 ML/MIN/1.73
GLOBULIN SER CALC-MCNC: 3 G/DL (ref 1.5–4.5)
GLUCOSE SERPL-MCNC: 125 MG/DL (ref 70–99)
HBA1C MFR BLD: 7.3 % (ref 4.8–5.6)
HDLC SERPL-MCNC: 37 MG/DL
LDLC SERPL CALC-MCNC: 85 MG/DL (ref 0–99)
MICROALBUMIN UR-MCNC: 1500.5 UG/ML
POTASSIUM SERPL-SCNC: 5.5 MMOL/L (ref 3.5–5.2)
PROT SERPL-MCNC: 7.2 G/DL (ref 6–8.5)
SODIUM SERPL-SCNC: 140 MMOL/L (ref 134–144)
TRIGL SERPL-MCNC: 158 MG/DL (ref 0–149)
VLDLC SERPL CALC-MCNC: 28 MG/DL (ref 5–40)

## 2025-05-19 ENCOUNTER — OFFICE VISIT (OUTPATIENT)
Dept: FAMILY MEDICINE CLINIC | Facility: CLINIC | Age: 65
End: 2025-05-19
Payer: MEDICARE

## 2025-05-19 ENCOUNTER — TELEPHONE (OUTPATIENT)
Dept: FAMILY MEDICINE CLINIC | Facility: CLINIC | Age: 65
End: 2025-05-19

## 2025-05-19 VITALS
OXYGEN SATURATION: 96 % | BODY MASS INDEX: 42.27 KG/M2 | RESPIRATION RATE: 18 BRPM | SYSTOLIC BLOOD PRESSURE: 142 MMHG | DIASTOLIC BLOOD PRESSURE: 82 MMHG | HEIGHT: 69 IN | HEART RATE: 94 BPM | TEMPERATURE: 97.1 F | WEIGHT: 285.4 LBS

## 2025-05-19 DIAGNOSIS — I10 HYPERTENSION, BENIGN: Primary | ICD-10-CM

## 2025-05-19 DIAGNOSIS — I48.20 CHRONIC ATRIAL FIBRILLATION: ICD-10-CM

## 2025-05-19 DIAGNOSIS — R19.7 DIARRHEA, UNSPECIFIED TYPE: ICD-10-CM

## 2025-05-19 PROCEDURE — 1159F MED LIST DOCD IN RCRD: CPT | Performed by: FAMILY MEDICINE

## 2025-05-19 PROCEDURE — 1125F AMNT PAIN NOTED PAIN PRSNT: CPT | Performed by: FAMILY MEDICINE

## 2025-05-19 PROCEDURE — 3079F DIAST BP 80-89 MM HG: CPT | Performed by: FAMILY MEDICINE

## 2025-05-19 PROCEDURE — 1160F RVW MEDS BY RX/DR IN RCRD: CPT | Performed by: FAMILY MEDICINE

## 2025-05-19 PROCEDURE — G2211 COMPLEX E/M VISIT ADD ON: HCPCS | Performed by: FAMILY MEDICINE

## 2025-05-19 PROCEDURE — 99214 OFFICE O/P EST MOD 30 MIN: CPT | Performed by: FAMILY MEDICINE

## 2025-05-19 PROCEDURE — 3077F SYST BP >= 140 MM HG: CPT | Performed by: FAMILY MEDICINE

## 2025-05-19 PROCEDURE — 3051F HG A1C>EQUAL 7.0%<8.0%: CPT | Performed by: FAMILY MEDICINE

## 2025-05-19 NOTE — TELEPHONE ENCOUNTER
Patient called the office because he is having upset stomach, stomach pain, and diarrhea from his medicine, diltiazem. He said that he was doing better with the medication that he was previously on. He said it was amlodipine. Please advise and call patient back.

## 2025-05-19 NOTE — ASSESSMENT & PLAN NOTE
Borderline poor control.  Elevated at Dr. Ogden's office Friday.  Patient tolerated Metoprolol well without side effects but felt that increasing the diltiazem from 120-180 caused vague side effects..  Advised to decrease Cardizem to 120 mg daily but he will discuss this with Dr. Ogden on his next visit.  Blood pressure goes up or atrial fibrillation returns he will need to try the 180 again..  Encouraged to watch salt, exercise more and lose weight.

## 2025-05-19 NOTE — ASSESSMENT & PLAN NOTE
New dx.  Probably 2nd to Viral gastroenteritis. Patient thinks related to increased Cardizem.  Discussed stool cultures, pt. Declines at present.  Advised BRAT diet.

## 2025-05-19 NOTE — PROGRESS NOTES
Subjective   Thor Crouch is a 64 y.o. male.   Chief Complaint   Patient presents with    Abdominal Pain    Diarrhea    Hypertension     Abdominal Pain  Chronicity:  New  Onset:  Today  Frequency:  Constantly  Progression since onset:  Unchanged  Associated symptoms: diarrhea    Associated symptoms: no fever and no vomiting    Diarrhea   This is a new problem. The current episode started in the past 7 days (Saturday). The problem occurs 5 to 10 times per day. The stool consistency is described as Watery (loose on Saturday). Associated symptoms include abdominal pain. Pertinent negatives include no chills, fever or vomiting.   Hypertension  Chronicity:  Chronic  Onset:  More than 1 year ago  Progression since onset:  Worse  Condition status:  Controlled  Associated symptoms: no chest pain, no palpitations and no shortness of breath         The following portions of the patient's history were reviewed and updated as appropriate: allergies, current medications, past family history, past medical history, past social history, past surgical history, and problem list.    Past Medical History:   Diagnosis Date    Atrial fibrillation     Cataract of both eyes     Other cataract of both eyes; Impression: Stable    Diabetic nephropathy associated with type 2 diabetes mellitus     Impression: Protein normal 06/18, will continue to repeat.    Epiretinal membrane, left     Impression: Followed with Dr. Murcia    History of prostate cancer     Impression: Patient has a follow up appt with Dr. Taylor NP today in [place], discussed with patient about transferring to Dr. Li or Michelle, so he can see them in the [place] office.    Hypertension, benign     Impression: Good control; Encouraged to watch salt, exercise more and lose weight    Left atrial enlargement     Impression: Stable, will follow conservatively    Malignant neoplasm of prostate     Impression: Doing well. Followed with Dr. Washington    Microcytic anemia      Impression: cbc with next labs    Overweight     >25    Seasonal allergic rhinitis due to pollen     Impression: Doing well    Situational stress     Impression: Stable    Skin lesion     Unspecified Skin Lesion; Impression: Will obtain notes from [facility].    Stroke with cerebral ischemia     Impression: Doing well at this time. Will obtain records from Dr. Seipel, regarding medication Plavix.    Type 2 diabetes mellitus with both eyes affected by mild nonproliferative retinopathy without macular edema, without long-term current use of insulin     Impression: Uncertain control; Advised patient to start monitoring blood sugar at home since taking OTC cough/cold medications.    Vitamin B12 deficiency     Impression: Patient was supposed to recieve a B12 injection, however patient left before it was done. Patient was called to come back to get this injection.       Past Surgical History:   Procedure Laterality Date    PROSTATE SURGERY          Family History   Problem Relation Age of Onset    Heart disease Mother         ischemic    Arthritis Mother     Goiter Father     Heart disease Father         ischemic    Heart attack Father     Diabetes Sister         diabetes mellitus    Diabetes Brother         diabetes mellitus    Diabetes Maternal Uncle         diabetes mellitus    Diabetes Paternal Grandmother         diabetes mellitus    Stroke Paternal Grandfather     Kidney failure Other         Uncle    Arthritis Other         grandmother        Social History     Socioeconomic History    Marital status:    Tobacco Use    Smoking status: Never     Passive exposure: Never    Smokeless tobacco: Never   Vaping Use    Vaping status: Never Used   Substance and Sexual Activity    Alcohol use: No    Drug use: No    Sexual activity: Defer       Outpatient Medications Prior to Visit   Medication Sig Dispense Refill    citalopram (CeleXA) 10 MG tablet Take 1 tablet by mouth Daily. 90 tablet 3    clopidogrel  "(PLAVIX) 75 MG tablet TAKE 1 TABLET BY MOUTH EVERY DAY 90 tablet 0    Continuous Glucose  (Dexcom G7 ) device Use 1 Device Every 10 (Ten) Days. 3 each 12    Continuous Glucose Sensor (Dexcom G7 Sensor) misc Use 1 Device Every 10 (Ten) Days. 3 each 3    Insulin Syringe-Needle U-100 (ReliOn Insulin Syringe) 31G X 15/64\" 1 ML misc Inject 1 syringe under the skin into the appropriate area as directed 2 (Two) Times a Day. 100 each 5    ReliOn Insulin Syringe 31G X 15/64\" 1 ML misc Inject 80 Units under the skin into the appropriate area as directed 3 (Three) Times a Day. 100 each 12    dilTIAZem CD (Cardizem CD) 180 MG 24 hr capsule Take 1 capsule by mouth Daily. 90 capsule 1    Insulin Degludec FlexTouch 200 UNIT/ML solution pen-injector INJECT 67 UNITS UNDER THE SKIN INTO THE APPROPRIATE AREA AS DIRECTED DAILY 9 mL 0    insulin regular (NovoLIN R) 100 UNIT/ML injection Inject 50 Units under the skin into the appropriate area as directed 2 (Two) Times a Day Before Meals. Take within 30 minutes of beginning meal. 6 each 5    lisinopril (PRINIVIL,ZESTRIL) 20 MG tablet Take 1 tablet by mouth 2 (Two) Times a Day.      lovastatin (MEVACOR) 40 MG tablet Take 1 tablet by mouth Every Evening. 90 tablet 3    metoprolol succinate XL (TOPROL-XL) 50 MG 24 hr tablet Take 1 tablet by mouth Daily. 90 tablet 3    Accu-Chek FastClix Lancets misc CHECK BLOOD SUGAR THREE TIMES DAILY (Patient not taking: Reported on 5/21/2025) 102 each 5    glucose blood (Accu-Chek Guide) test strip USE THREE TIMES DAILY (Patient not taking: Reported on 5/21/2025) 300 each 3    glucose blood (Accu-Chek Guide) test strip USE THREE TIMES DAILY (Patient not taking: Reported on 5/21/2025) 300 each 2     No facility-administered medications prior to visit.        Review of Systems   Constitutional:  Negative for chills, diaphoresis, fatigue and fever.   Respiratory:  Negative for shortness of breath.    Cardiovascular:  Negative for chest pain " "and palpitations.   Gastrointestinal:  Positive for abdominal pain and diarrhea. Negative for vomiting.       Objective   Visit Vitals  /82 (BP Location: Left arm, Patient Position: Sitting, Cuff Size: Adult)   Pulse 94   Temp 97.1 °F (36.2 °C) (Temporal)   Resp 18   Ht 175.3 cm (69\")   Wt 129 kg (285 lb 6.4 oz)   SpO2 96%   BMI 42.15 kg/m²     Physical Exam  Vitals and nursing note reviewed.   Constitutional:       Appearance: Normal appearance. He is well-developed.   HENT:      Head: Normocephalic.   Neck:      Thyroid: No thyromegaly.      Vascular: No carotid bruit.      Trachea: Trachea normal.   Cardiovascular:      Rate and Rhythm: Normal rate and regular rhythm.      Heart sounds: No murmur heard.     No friction rub. No gallop.   Pulmonary:      Effort: Pulmonary effort is normal. No respiratory distress.      Breath sounds: Normal breath sounds. No wheezing.   Chest:      Chest wall: No tenderness.   Abdominal:      Palpations: Abdomen is soft.      Tenderness: There is no abdominal tenderness.   Musculoskeletal:      Cervical back: Neck supple.   Skin:     General: Skin is dry.      Findings: No rash.      Nails: There is no clubbing.   Neurological:      Mental Status: He is alert and oriented to person, place, and time.   Psychiatric:         Behavior: Behavior is cooperative.         Assessment & Plan   Problem List Items Addressed This Visit          High    Chronic atrial fibrillation    Current Assessment & Plan   Questionable diagnosis per Dr. Ogden because Dr. Ogden has not seen strips from [facility].  Patient waiting on insurance approval for 30 day holter monitor.            Medium    Hypertension, benign - Primary    Current Assessment & Plan   Borderline poor control.  Elevated at Dr. Ogden's office Friday.  Patient tolerated Metoprolol well without side effects but felt that increasing the diltiazem from 120-180 caused vague side effects..  Advised to decrease Cardizem to 120 mg daily but he " will discuss this with Dr. Ogden on his next visit.  Blood pressure goes up or atrial fibrillation returns he will need to try the 180 again..  Encouraged to watch salt, exercise more and lose weight.              Unprioritized    Diarrhea    Overview   Wife had to go to the bathroom as soon as she ate after going out to dinner.    Patient has taken Imodium 2 x          Current Assessment & Plan   New dx.  Probably 2nd to Viral gastroenteritis. Patient thinks related to increased Cardizem.  Discussed stool cultures, pt. Declines at present.  Advised BRAT diet.

## 2025-05-19 NOTE — ASSESSMENT & PLAN NOTE
Questionable diagnosis per Dr. Ogden because Dr. Ogden has not seen strips from [facility].  Patient waiting on insurance approval for 30 day holter monitor.

## 2025-05-21 ENCOUNTER — OFFICE VISIT (OUTPATIENT)
Dept: FAMILY MEDICINE CLINIC | Facility: CLINIC | Age: 65
End: 2025-05-21
Payer: MEDICARE

## 2025-05-21 VITALS
RESPIRATION RATE: 20 BRPM | SYSTOLIC BLOOD PRESSURE: 124 MMHG | WEIGHT: 284 LBS | DIASTOLIC BLOOD PRESSURE: 64 MMHG | TEMPERATURE: 97.1 F | HEART RATE: 92 BPM | HEIGHT: 69 IN | BODY MASS INDEX: 42.06 KG/M2 | OXYGEN SATURATION: 95 %

## 2025-05-21 DIAGNOSIS — I48.0 PAROXYSMAL ATRIAL FIBRILLATION: ICD-10-CM

## 2025-05-21 DIAGNOSIS — I10 HYPERTENSION, BENIGN: Primary | ICD-10-CM

## 2025-05-21 DIAGNOSIS — R74.8 ELEVATED ALKALINE PHOSPHATASE LEVEL: ICD-10-CM

## 2025-05-21 DIAGNOSIS — E66.01 CLASS 3 SEVERE OBESITY DUE TO EXCESS CALORIES WITH BODY MASS INDEX (BMI) OF 40.0 TO 44.9 IN ADULT, UNSPECIFIED WHETHER SERIOUS COMORBIDITY PRESENT: ICD-10-CM

## 2025-05-21 DIAGNOSIS — R79.82 ELEVATED C-REACTIVE PROTEIN (CRP): ICD-10-CM

## 2025-05-21 DIAGNOSIS — E78.2 MIXED HYPERLIPIDEMIA: ICD-10-CM

## 2025-05-21 DIAGNOSIS — E66.813 CLASS 3 SEVERE OBESITY DUE TO EXCESS CALORIES WITH BODY MASS INDEX (BMI) OF 40.0 TO 44.9 IN ADULT, UNSPECIFIED WHETHER SERIOUS COMORBIDITY PRESENT: ICD-10-CM

## 2025-05-21 DIAGNOSIS — E11.3293 TYPE 2 DIABETES MELLITUS WITH BOTH EYES AFFECTED BY MILD NONPROLIFERATIVE RETINOPATHY WITHOUT MACULAR EDEMA, WITHOUT LONG-TERM CURRENT USE OF INSULIN: ICD-10-CM

## 2025-05-21 DIAGNOSIS — N18.31 STAGE 3A CHRONIC KIDNEY DISEASE: ICD-10-CM

## 2025-05-21 DIAGNOSIS — G47.33 OBSTRUCTIVE SLEEP APNEA: ICD-10-CM

## 2025-05-21 DIAGNOSIS — E11.21 DIABETIC NEPHROPATHY ASSOCIATED WITH TYPE 2 DIABETES MELLITUS: ICD-10-CM

## 2025-05-21 DIAGNOSIS — I48.20 CHRONIC ATRIAL FIBRILLATION: ICD-10-CM

## 2025-05-21 DIAGNOSIS — E87.5 HYPERKALEMIA: ICD-10-CM

## 2025-05-21 DIAGNOSIS — R19.7 DIARRHEA, UNSPECIFIED TYPE: ICD-10-CM

## 2025-05-21 PROCEDURE — 1159F MED LIST DOCD IN RCRD: CPT | Performed by: FAMILY MEDICINE

## 2025-05-21 PROCEDURE — 99214 OFFICE O/P EST MOD 30 MIN: CPT | Performed by: FAMILY MEDICINE

## 2025-05-21 PROCEDURE — 1125F AMNT PAIN NOTED PAIN PRSNT: CPT | Performed by: FAMILY MEDICINE

## 2025-05-21 PROCEDURE — 3051F HG A1C>EQUAL 7.0%<8.0%: CPT | Performed by: FAMILY MEDICINE

## 2025-05-21 PROCEDURE — 3074F SYST BP LT 130 MM HG: CPT | Performed by: FAMILY MEDICINE

## 2025-05-21 PROCEDURE — 3078F DIAST BP <80 MM HG: CPT | Performed by: FAMILY MEDICINE

## 2025-05-21 PROCEDURE — 1160F RVW MEDS BY RX/DR IN RCRD: CPT | Performed by: FAMILY MEDICINE

## 2025-05-21 PROCEDURE — G2211 COMPLEX E/M VISIT ADD ON: HCPCS | Performed by: FAMILY MEDICINE

## 2025-05-21 RX ORDER — INSULIN DEGLUDEC 200 U/ML
50 INJECTION, SOLUTION SUBCUTANEOUS 3 TIMES DAILY
Start: 2025-05-21

## 2025-05-21 RX ORDER — LISINOPRIL 20 MG/1
20 TABLET ORAL 2 TIMES DAILY
Start: 2025-05-21

## 2025-05-21 RX ORDER — LOVASTATIN 40 MG/1
40 TABLET ORAL EVERY EVENING
Start: 2025-05-21

## 2025-05-21 RX ORDER — HUMAN INSULIN 100 [IU]/ML
50 INJECTION, SOLUTION SUBCUTANEOUS
Start: 2025-05-21

## 2025-05-21 RX ORDER — METOPROLOL SUCCINATE 50 MG/1
50 TABLET, EXTENDED RELEASE ORAL DAILY
Start: 2025-05-21

## 2025-05-21 RX ORDER — DILTIAZEM HYDROCHLORIDE 120 MG/1
120 CAPSULE, COATED, EXTENDED RELEASE ORAL DAILY
Qty: 90 CAPSULE | Refills: 1 | Status: SHIPPED | OUTPATIENT
Start: 2025-05-21

## 2025-05-21 NOTE — ASSESSMENT & PLAN NOTE
New dx.  CMP done 5-, read by me, reviewed with pt.  Alk. Phos was 159 up from 103.  2nd to fractured ribs.

## 2025-05-21 NOTE — ASSESSMENT & PLAN NOTE
Patient's (Body mass index is 41.94 kg/m².) indicates that they are morbidly/severely obese (BMI > 40 or > 35 with obesity - related health condition) with health conditions that include obstructive sleep apnea, hypertension, diabetes mellitus, and dyslipidemias . Weight is improving with lifestyle modifications. BMI  is above average; BMI management plan is completed. We discussed portion control and increasing exercise.

## 2025-05-21 NOTE — ASSESSMENT & PLAN NOTE
Worse.  Microalbumin/Creatinine ratio done 5-, read by me, reviewed with pt.  Microalbumin/Creatinine ratio was 548 up from 152.  Advised referral to Nephrologist, pt. Agrees and order placed.

## 2025-05-21 NOTE — ASSESSMENT & PLAN NOTE
A1c done 5-, read by me, reviewed with pt.  Improved..   Encouraged to watch sugar intake, exercise more and lose weight.   Non-compliant with medication. Patient tolerated Novolin R  well without side effects. I feel the benefits of the medication outweigh the risks.   Monitoring sugar at home.   Follow up in 3 months  Care management needs are self-addressed. . Self-management abilities addressed and patient is capable of managing his own disease.

## 2025-05-21 NOTE — ASSESSMENT & PLAN NOTE
Worse.  CMP done 5-, read by me, reviewed with pt.  Potassium was 5.5 up from 5.2.  Advised to discuss further with Nephrologist.  May need to add a diuretic or decrease lisinopril

## 2025-05-21 NOTE — ASSESSMENT & PLAN NOTE
Patient is concerned about side effects with Diltiazem 180 mg and requested me to switch him to 120 mg.  I sent in 120 mg and advised patient to discuss with Dr. Ogden.   Pulmonary Progress Note    Date of Admission: 4/20/2019   LOS: 3 days     Chief Complaint   Patient presents with    Fatigue     dx with pneumonia on 4/1. she has been on abx (z-pack, levofloxacin). no improvement.  Fever     100.7 today. f/u unresolving pneumonia    Assessment:     Multifocal Pneumonia  Recurrent Fever  Emphysema  Recurrent Acute Bronchitis    Plan:      -still having intermittent fever despite broad spectrum antibiotic, procal is negative. Suspicion for noninfectious etiology. -NASREEN negative, RF elevated  - ANCA, ccp, galactomannan pending  -RVP negative  -bronchoscopy today cell count, cutlures, cytology pending    HPI/Subjective  Afebrile o/n. Reports breathing is at baseline but still concerned about recurrent fevers. ROS:   No nausea  No Vomiting  No chest pain    No intake or output data in the 24 hours ending 04/23/19 1126      PHYSICAL EXAM:   Blood pressure (!) 125/54, pulse 69, temperature 97.9 °F (36.6 °C), temperature source Oral, resp. rate 16, height 5' 3\" (1.6 m), weight 157 lb 6.5 oz (71.4 kg), SpO2 96 %, not currently breastfeeding.'  Gen:  No acute distress. Eyes: PERRL. Anicteric sclera. No conjunctival injection. ENT: No discharge. Posterior oropharynx clear. External appearance of ears and nose normal.  Neck: Trachea midline. No mass   Resp:  No crackles. No wheezes. No rhonchi. No dullness on percussion. CV: Regular rate. Regular rhythm. No murmur or rub. No edema. GI: Soft, Non-tender. Non-distended. +BS  Skin: Warm, dry, w/o erythema. Lymph: No cervical or supraclavicular LAD. M/S: No cyanosis. No clubbing. Neuro:  CN 2-12 tested, no focal neurologic deficit. Moves all extremities  Psych: Awake and alert, Oriented x 3. Judgement and insight appropriate. Mood stable.       Medications:    Scheduled Meds:   aspirin  325 mg Oral Daily    atenolol  25 mg Oral Daily    atorvastatin  40 mg Oral Daily    estradiol  0.5 mg Oral Daily    medroxyPROGESTERone  2.5 mg Oral Every Other Day    sodium chloride flush  10 mL Intravenous 2 times per day    enoxaparin  40 mg Subcutaneous Daily    cefepime  1 g Intravenous Q12H       Continuous Infusions:   sodium chloride 75 mL/hr at 04/23/19 0147       PRN Meds:  albuterol sulfate HFA, sodium chloride flush, ibuprofen, magnesium sulfate, potassium chloride **OR** potassium alternative oral replacement **OR** potassium chloride, iopamidol    Labs reviewed:  CBC:   Recent Labs     04/20/19 1911 04/22/19  0858 04/23/19  0737   WBC 13.0* 9.8 7.3   HGB 11.5* 10.3* 9.2*   HCT 35.2* 32.0* 27.9*   MCV 91.5 91.8 90.6    318 290     BMP:   Recent Labs     04/20/19 1911 04/22/19  0858 04/23/19  0737   * 138 140   K 4.6 3.1* 3.4*   CL 96* 104 108   CO2 25 22 23   PHOS  --  2.3* 2.0*   BUN 24* 18 11   CREATININE 1.2 1.0 0.8     LIVER PROFILE:   Recent Labs     04/20/19 1911   AST 29   ALT 15   BILITOT 0.3   ALKPHOS 79     PT/INR: No results for input(s): PROTIME, INR in the last 72 hours. APTT: No results for input(s): APTT in the last 72 hours. UA:  Recent Labs     04/20/19 1911   COLORU YELLOW   PHUR 6.0   WBCUA 5   RBCUA 2   BACTERIA RARE*   CLARITYU CLOUDY*   SPECGRAV 1.016   LEUKOCYTESUR SMALL*   UROBILINOGEN 0.2   BILIRUBINUR Negative   BLOODU Negative   GLUCOSEU Negative     No results for input(s): PH, PCO2, PO2 in the last 72 hours. Films:  Radiology Review:  Pertinent images / reports were reviewed as a part of this visit. CT Chest w/ contrast: No results found for this or any previous visit. CT Chest w/o contrast:   Results for orders placed during the hospital encounter of 04/20/19   CT CHEST WO CONTRAST    Narrative EXAMINATION:  CT OF THE CHEST WITHOUT CONTRAST 4/20/2019 8:02 pm    TECHNIQUE:  CT of the chest was performed without the administration of intravenous  contrast. Multiplanar reformatted images are provided for review.  Dose  modulation, iterative reconstruction, and/or weight based adjustment of the  mA/kV was utilized to reduce the radiation dose to as low as reasonably  achievable. COMPARISON:  High-resolution CT chest 07/17/2017    HISTORY:  Fatigue, dx with pneumonia on 4/1. she has been on abx (z-pack,  levofloxacin). no improvement. Acuity: Acute    FINDINGS:  Mediastinum: Cardiac structures and great vessels appear unremarkable with  exception of calcific atherosclerotic disease. No pericardial effusion. Posterior mediastinal structures appear unremarkable. No mediastinal or  hilar adenopathy. Lungs/pleura: Stable appearance of biapical scarring with new focal  infiltrate posteromedial left upper lobe and anterolateral lingula left upper  lobe, as well as patchy alveolar and infiltrative densities bilateral lower  lungs, concerning for pneumonia. A few scattered tree-in-bud configured  opacities lateral lower right lung are typical of infectious process, with  similar appearing opacities lateral within the lingula left upper lobe. Sequela from smoking including emphysema predominates in the upper lungs. No  inspissated secretions or endobronchial lesion evident. No pleural effusion  or pneumothorax. Upper Abdomen: Unremarkable appearance. Soft Tissues/Bones: No acute superficial soft tissue or osseous structure  abnormality evident. Impression 1. Patchy infiltrates in the bilateral lungs as described typical pneumonia. Follow-up IV contrast-enhanced CT chest in 3-4 months is recommended to  ensure clearing. 2. Acute pulmonary changes are on a background of sequela from smoking  including emphysema. 3. Calcific atherosclerosis aorta and coronary arteries. RECOMMENDATIONS:  IV contrast-enhanced CT chest in 3-4 months         CTPA: No results found for this or any previous visit.     CXR PA/LAT:   Results for orders placed during the hospital encounter of 04/15/19   XR CHEST STANDARD (2 VW)    Narrative EXAMINATION:  TWO VIEWS OF THE CHEST    4/15/2019 1:34 pm    COMPARISON:  04/01/2019    HISTORY:  ORDERING SYSTEM PROVIDED HISTORY: Pneumonia due to infectious organism,  unspecified laterality, unspecified part of lung  TECHNOLOGIST PROVIDED HISTORY:  Reason for exam:->pneumonia  Ordering Physician Provided Reason for Exam: follow up pneumonia  Acuity: Acute  Type of Exam: Initial  Relevant Medical/Surgical History: former smoker cad borderline copd    FINDINGS:  Normal heart size and pulmonary vasculature. Aortic calcifications. Slight  improvement of left basilar consolidation suggesting resolving pneumonia or  atelectasis. Stable opacities in the right lateral upper lung likely  scarring. No effusion. No pneumothorax. Hyperinflated lungs with flattened  diaphragms compatible with emphysematous changes      Impression Slight improvement in left basilar consolidation suggesting resolving  pneumonia or atelectasis. Continued follow-up advised to ensure complete  resolution. Emphysematous changes. CXR portable: No results found for this or any previous visit.      Thank you for this consult,    Julius Haro [address] [facility]

## 2025-05-21 NOTE — PROGRESS NOTES
Subjective   Thor Crouch is a 64 y.o. male.   Chief Complaint   Patient presents with    Hypertension    Diarrhea    Diabetes    Hyperlipidemia    Abnormal Lab    Chronic Kidney Disease     Hypertension  Chronicity:  Chronic  Onset:  More than 1 year ago  Progression since onset:  Improved  Condition status:  Controlled  Associated symptoms: no chest pain, no palpitations and no shortness of breath    Diarrhea   This is a chronic problem. The current episode started in the past 7 days. The problem has been gradually improving. The patient states that diarrhea does not awaken him from sleep. Pertinent negatives include no myalgias or weight loss.   Diabetes  Visit type:  Follow-up  Diabetes type:  Type 2  Disease course:  Improving  Associated symptoms:     no chest pain, no fatigue, no polyphagia, no polyuria and no weight loss    Hyperlipidemia  This is a chronic problem. The current episode started more than 1 year ago. The problem is controlled. Recent lipid tests were reviewed and are high. Pertinent negatives include no chest pain, myalgias or shortness of breath. Current antihyperlipidemic treatment includes statins. The current treatment provides mild improvement of lipids.   Abnormal Lab  Today's concern : Microalbumin/Creatinine ratio.   Symptoms occur constantly.   Symptoms have been worse since onset.   Pertinent negative symptoms include no chest pain, no fatigue, no myalgias, no nausea and no numbness.   Chronic Kidney Disease  Symptoms are chronic.   Symptoms occur constantly.   Symptoms have been improved since onset.   Pertinent negative symptoms include no chest pain, no fatigue, no myalgias, no nausea and no numbness.        The following portions of the patient's history were reviewed and updated as appropriate: allergies, current medications, past family history, past medical history, past social history, past surgical history, and problem list.    Past Medical History:   Diagnosis Date    Atrial  fibrillation     Cataract of both eyes     Other cataract of both eyes; Impression: Stable    Diabetic nephropathy associated with type 2 diabetes mellitus     Impression: Protein normal 06/18, will continue to repeat.    Epiretinal membrane, left     Impression: Followed with Dr. Murcia    History of prostate cancer     Impression: Patient has a follow up appt with Dr. Taylor NP today in [place], discussed with patient about transferring to Dr. Li or Michelle, so he can see them in the [place] office.    Hypertension, benign     Impression: Good control; Encouraged to watch salt, exercise more and lose weight    Left atrial enlargement     Impression: Stable, will follow conservatively    Malignant neoplasm of prostate     Impression: Doing well. Followed with Dr. Washington    Microcytic anemia     Impression: cbc with next labs    Overweight     >25    Seasonal allergic rhinitis due to pollen     Impression: Doing well    Situational stress     Impression: Stable    Skin lesion     Unspecified Skin Lesion; Impression: Will obtain notes from [facility].    Stroke with cerebral ischemia     Impression: Doing well at this time. Will obtain records from Dr. Seipel, regarding medication Plavix.    Type 2 diabetes mellitus with both eyes affected by mild nonproliferative retinopathy without macular edema, without long-term current use of insulin     Impression: Uncertain control; Advised patient to start monitoring blood sugar at home since taking OTC cough/cold medications.    Vitamin B12 deficiency     Impression: Patient was supposed to recieve a B12 injection, however patient left before it was done. Patient was called to come back to get this injection.       Past Surgical History:   Procedure Laterality Date    PROSTATE SURGERY          Family History   Problem Relation Age of Onset    Heart disease Mother         ischemic    Arthritis Mother     Goiter Father     Heart disease Father          "ischemic    Heart attack Father     Diabetes Sister         diabetes mellitus    Diabetes Brother         diabetes mellitus    Diabetes Maternal Uncle         diabetes mellitus    Diabetes Paternal Grandmother         diabetes mellitus    Stroke Paternal Grandfather     Kidney failure Other         Uncle    Arthritis Other         grandmother        Social History     Socioeconomic History    Marital status:    Tobacco Use    Smoking status: Never     Passive exposure: Never    Smokeless tobacco: Never   Vaping Use    Vaping status: Never Used   Substance and Sexual Activity    Alcohol use: No    Drug use: No    Sexual activity: Defer       Outpatient Medications Prior to Visit   Medication Sig Dispense Refill    citalopram (CeleXA) 10 MG tablet Take 1 tablet by mouth Daily. 90 tablet 3    clopidogrel (PLAVIX) 75 MG tablet TAKE 1 TABLET BY MOUTH EVERY DAY 90 tablet 0    Continuous Glucose  (Dexcom G7 ) device Use 1 Device Every 10 (Ten) Days. 3 each 12    Continuous Glucose Sensor (Dexcom G7 Sensor) misc Use 1 Device Every 10 (Ten) Days. 3 each 3    Insulin Syringe-Needle U-100 (ReliOn Insulin Syringe) 31G X 15/64\" 1 ML misc Inject 1 syringe under the skin into the appropriate area as directed 2 (Two) Times a Day. 100 each 5    ReliOn Insulin Syringe 31G X 15/64\" 1 ML misc Inject 80 Units under the skin into the appropriate area as directed 3 (Three) Times a Day. 100 each 12    dilTIAZem CD (Cardizem CD) 180 MG 24 hr capsule Take 1 capsule by mouth Daily. 90 capsule 1    Insulin Degludec FlexTouch 200 UNIT/ML solution pen-injector INJECT 67 UNITS UNDER THE SKIN INTO THE APPROPRIATE AREA AS DIRECTED DAILY 9 mL 0    insulin regular (NovoLIN R) 100 UNIT/ML injection Inject 50 Units under the skin into the appropriate area as directed 2 (Two) Times a Day Before Meals. Take within 30 minutes of beginning meal. 6 each 5    lisinopril (PRINIVIL,ZESTRIL) 20 MG tablet Take 1 tablet by mouth 2 (Two) " "Times a Day.      lovastatin (MEVACOR) 40 MG tablet Take 1 tablet by mouth Every Evening. 90 tablet 3    metoprolol succinate XL (TOPROL-XL) 50 MG 24 hr tablet Take 1 tablet by mouth Daily. 90 tablet 3    Accu-Chek FastClix Lancets misc CHECK BLOOD SUGAR THREE TIMES DAILY (Patient not taking: Reported on 5/21/2025) 102 each 5    glucose blood (Accu-Chek Guide) test strip USE THREE TIMES DAILY (Patient not taking: Reported on 5/21/2025) 300 each 3    glucose blood (Accu-Chek Guide) test strip USE THREE TIMES DAILY (Patient not taking: Reported on 5/21/2025) 300 each 2     No facility-administered medications prior to visit.        Review of Systems   Constitutional:  Negative for fatigue, unexpected weight gain and unexpected weight loss.   Eyes:  Negative for visual disturbance.   Respiratory:  Negative for shortness of breath.    Cardiovascular:  Negative for chest pain, palpitations and leg swelling.   Gastrointestinal:  Positive for diarrhea (improving). Negative for nausea.   Endocrine: Negative for polyphagia and polyuria.   Genitourinary:  Negative for frequency.   Musculoskeletal:  Negative for myalgias.   Skin:  Negative for dry skin and skin lesions.   Neurological:  Negative for syncope, numbness and headache.       Objective   Visit Vitals  /64 (BP Location: Left arm, Patient Position: Sitting, Cuff Size: Large Adult)   Pulse 92   Temp 97.1 °F (36.2 °C) (Temporal)   Resp 20   Ht 175.3 cm (69\")   Wt 129 kg (284 lb)   SpO2 95%   BMI 41.94 kg/m²     Physical Exam  Vitals and nursing note reviewed.   Constitutional:       Appearance: He is well-developed.   HENT:      Head: Normocephalic.   Neck:      Thyroid: No thyromegaly.      Vascular: No carotid bruit.      Trachea: Trachea normal.   Cardiovascular:      Rate and Rhythm: Normal rate and regular rhythm.      Heart sounds: No murmur heard.     No friction rub. No gallop.   Pulmonary:      Effort: Pulmonary effort is normal. No respiratory distress. "      Breath sounds: Normal breath sounds. No wheezing.   Chest:      Chest wall: No tenderness.   Musculoskeletal:      Cervical back: Neck supple.   Feet:      Comments: PATIENT DECLINED TO HAVE DIABETIC FOOT EXAM  Skin:     General: Skin is dry.      Findings: No rash.      Nails: There is no clubbing.   Neurological:      Mental Status: He is alert and oriented to person, place, and time.   Psychiatric:         Behavior: Behavior is cooperative.         Assessment & Plan   Problem List Items Addressed This Visit          High    Chronic atrial fibrillation    Current Assessment & Plan   Patient is concerned about side effects with Diltiazem 180 mg and requested me to switch him to 120 mg.  I sent in 120 mg and advised patient to discuss with Dr. Ogden.         Relevant Medications    metoprolol succinate XL (TOPROL-XL) 50 MG 24 hr tablet    dilTIAZem CD (Cardizem CD) 120 MG 24 hr capsule       Medium    Diabetic nephropathy associated with type 2 diabetes mellitus    Current Assessment & Plan   Worse.  Microalbumin/Creatinine ratio done 5-, read by me, reviewed with pt.  Microalbumin/Creatinine ratio was 548 up from 152.  Advised referral to Nephrologist, pt. Agrees and order placed.         Relevant Medications    Insulin Degludec FlexTouch 200 UNIT/ML solution pen-injector    insulin regular (NovoLIN R) 100 UNIT/ML injection    Other Relevant Orders    Ambulatory Referral to Nephrology    Microalbumin / Creatinine Urine Ratio - Urine, Clean Catch    Hyperlipidemia    Overview   LDL goal less than 70         Current Assessment & Plan   Lipid and CMP done 5-, read by me, reviewed with pt.  Trig. 158 up from 141, Tot. Chol. 150 down from 168, HDL 37 up from 35, LDL 85 down from 105  Improved but not at goal.   Encouraged to watch fatty intake, exercise more, and lose weight.   Compliant with medication Patient tolerated Lovastatin well without side effects. I feel the benefits of the medication  outweigh the risks.   Is not getting adequate diet and exercise  Goals developed at last visit were not met close to goal.  Follow up in  3 months  Care management needs are self-addressed. Self-management abilities addressed and patient is capable of managing his own disease.           Relevant Medications    lovastatin (MEVACOR) 40 MG tablet    Other Relevant Orders    Lipid Panel With / Chol / HDL Ratio    Comprehensive Metabolic Panel    Hypertension, benign - Primary    Current Assessment & Plan   Doing well.  He is checking blood pressures at home.  They are borderline high.  Continue to follow these closely these will be scanned to the chart patient tolerated Lisinopril and Metoprolol well without side effects. I feel the benefits of the medication outweigh the risks.   Encouraged to watch salt, exercise more and lose weight.           Relevant Medications    metoprolol succinate XL (TOPROL-XL) 50 MG 24 hr tablet    lisinopril (PRINIVIL,ZESTRIL) 20 MG tablet    dilTIAZem CD (Cardizem CD) 120 MG 24 hr capsule    Stage 3a chronic kidney disease    Current Assessment & Plan   Improved.  CMP done 5-, read by me, reviewed with pt.  Creatinine was 1.12 down from 1.23 and EGFR was 72 up from 65.  Advised to discuss further with Nephrologist due to to his proteinuria         Relevant Orders    Ambulatory Referral to Nephrology    Type 2 diabetes mellitus with both eyes affected by mild nonproliferative retinopathy without macular edema, without long-term current use of insulin    Overview   Tresibia 50 units twice a day  Novolin R 50-50 units with meals.  He is developing hypoglycemia and hyperglycemia due to use in the standard dose.  He will adjust dose up and down depending on his activity level and intake    He has side effects from metformin cannot tolerate this.  Off Trulicity due to cost         Current Assessment & Plan   A1c done 5-, read by me, reviewed with pt.  Improved..   Encouraged to  watch sugar intake, exercise more and lose weight.   Non-compliant with medication. Patient tolerated Novolin R  well without side effects. I feel the benefits of the medication outweigh the risks.   Monitoring sugar at home.   Follow up in 3 months  Care management needs are self-addressed. . Self-management abilities addressed and patient is capable of managing his own disease.           Relevant Medications    Insulin Degludec FlexTouch 200 UNIT/ML solution pen-injector    insulin regular (NovoLIN R) 100 UNIT/ML injection    Other Relevant Orders    Hemoglobin A1c       Low    Obstructive sleep apnea    Overview   Followed with Dr. Seiple            Unprioritized    Class 3 severe obesity with body mass index (BMI) of 40.0 to 44.9 in adult    Current Assessment & Plan   Patient's (Body mass index is 41.94 kg/m².) indicates that they are morbidly/severely obese (BMI > 40 or > 35 with obesity - related health condition) with health conditions that include obstructive sleep apnea, hypertension, diabetes mellitus, and dyslipidemias . Weight is improving with lifestyle modifications. BMI  is above average; BMI management plan is completed. We discussed portion control and increasing exercise.          Diarrhea    Overview   Wife had to go to the bathroom as soon as she ate after going out to dinner they question if it was infectious as the cause.    Patient has taken Imodium 2 x          Current Assessment & Plan   Improved         Elevated alkaline phosphatase level    Current Assessment & Plan   New dx.  CMP done 5-, read by me, reviewed with pt.  Alk. Phos was 159 up from 103.  2nd to fractured ribs.         Elevated C-reactive protein (CRP)    Current Assessment & Plan   Improved.  CRP done 5-, read by me, reviewed with pt.  CRP was 9 down from 15         Hyperkalemia    Current Assessment & Plan   Worse.  CMP done 5-, read by me, reviewed with pt.  Potassium was 5.5 up from 5.2.  Advised to  discuss further with Nephrologist.  May need to add a diuretic or decrease lisinopril         Relevant Orders    Ambulatory Referral to Nephrology     Other Visit Diagnoses         Paroxysmal atrial fibrillation        Relevant Medications    metoprolol succinate XL (TOPROL-XL) 50 MG 24 hr tablet    dilTIAZem CD (Cardizem CD) 120 MG 24 hr capsule

## 2025-05-21 NOTE — ASSESSMENT & PLAN NOTE
Lipid and CMP done 5-, read by me, reviewed with pt.  Trig. 158 up from 141, Tot. Chol. 150 down from 168, HDL 37 up from 35, LDL 85 down from 105  Improved but not at goal.   Encouraged to watch fatty intake, exercise more, and lose weight.   Compliant with medication Patient tolerated Lovastatin well without side effects. I feel the benefits of the medication outweigh the risks.   Is not getting adequate diet and exercise  Goals developed at last visit were not met close to goal.  Follow up in  3 months  Care management needs are self-addressed. Self-management abilities addressed and patient is capable of managing his own disease.

## 2025-05-21 NOTE — ASSESSMENT & PLAN NOTE
Doing well.  He is checking blood pressures at home.  They are borderline high.  Continue to follow these closely these will be scanned to the chart patient tolerated Lisinopril and Metoprolol well without side effects. I feel the benefits of the medication outweigh the risks.   Encouraged to watch salt, exercise more and lose weight.

## 2025-05-21 NOTE — ASSESSMENT & PLAN NOTE
Improved.  CMP done 5-, read by me, reviewed with pt.  Creatinine was 1.12 down from 1.23 and EGFR was 72 up from 65.  Advised to discuss further with Nephrologist due to to his proteinuria

## 2025-06-06 DIAGNOSIS — E11.3293 TYPE 2 DIABETES MELLITUS WITH BOTH EYES AFFECTED BY MILD NONPROLIFERATIVE RETINOPATHY WITHOUT MACULAR EDEMA, WITHOUT LONG-TERM CURRENT USE OF INSULIN: ICD-10-CM

## 2025-06-09 RX ORDER — INSULIN DEGLUDEC 200 U/ML
INJECTION, SOLUTION SUBCUTANEOUS
Qty: 9 ML | Refills: 2 | Status: SHIPPED | OUTPATIENT
Start: 2025-06-09

## 2025-06-10 ENCOUNTER — HOSPITAL ENCOUNTER (OUTPATIENT)
Dept: CARDIOLOGY | Facility: HOSPITAL | Age: 65
Discharge: HOME | End: 2025-06-10
Payer: MEDICARE

## 2025-06-10 ENCOUNTER — PATIENT ROUNDING (BHMG ONLY) (OUTPATIENT)
Dept: CARDIOLOGY | Facility: CLINIC | Age: 65
End: 2025-06-10
Payer: MEDICARE

## 2025-06-10 DIAGNOSIS — G47.33 OBSTRUCTIVE SLEEP APNEA: ICD-10-CM

## 2025-06-10 DIAGNOSIS — I10 HYPERTENSION, BENIGN: ICD-10-CM

## 2025-06-10 DIAGNOSIS — E78.2 MIXED HYPERLIPIDEMIA: ICD-10-CM

## 2025-06-10 DIAGNOSIS — I48.0 PAROXYSMAL ATRIAL FIBRILLATION: ICD-10-CM

## 2025-06-10 DIAGNOSIS — E11.3293 TYPE 2 DIABETES MELLITUS WITH BOTH EYES AFFECTED BY MILD NONPROLIFERATIVE RETINOPATHY WITHOUT MACULAR EDEMA, WITHOUT LONG-TERM CURRENT USE OF INSULIN: ICD-10-CM

## 2025-06-10 NOTE — PROGRESS NOTES
A My-Chart message has been sent to the patient for PATIENT ROUNDING with [facility] [place].

## 2025-06-25 ENCOUNTER — TELEPHONE (OUTPATIENT)
Dept: FAMILY MEDICINE CLINIC | Facility: CLINIC | Age: 65
End: 2025-06-25
Payer: MEDICARE

## 2025-06-25 NOTE — TELEPHONE ENCOUNTER
LMOM that Nephrologist appt. With Dr. Esparza is scheduled for 7-11-25 @ 11:00 in the same building as Dr. Sharp's office

## 2025-07-14 LAB
CV ZIO BASELINE AVG BPM: 62
CV ZIO BASELINE BPM HIGH: 120
CV ZIO BASELINE BPM LOW: 38

## 2025-07-17 DIAGNOSIS — Z79.4 TYPE 2 DIABETES MELLITUS WITHOUT COMPLICATION, WITH LONG-TERM CURRENT USE OF INSULIN: ICD-10-CM

## 2025-07-17 DIAGNOSIS — E11.9 TYPE 2 DIABETES MELLITUS WITHOUT COMPLICATION, WITH LONG-TERM CURRENT USE OF INSULIN: ICD-10-CM

## 2025-07-17 RX ORDER — BLOOD SUGAR DIAGNOSTIC
STRIP MISCELLANEOUS 3 TIMES DAILY
Qty: 100 EACH | Refills: 2 | Status: SHIPPED | OUTPATIENT
Start: 2025-07-17

## 2025-07-22 ENCOUNTER — OFFICE VISIT (OUTPATIENT)
Dept: FAMILY MEDICINE CLINIC | Facility: CLINIC | Age: 65
End: 2025-07-22
Payer: MEDICARE

## 2025-07-22 VITALS
OXYGEN SATURATION: 92 % | BODY MASS INDEX: 42 KG/M2 | RESPIRATION RATE: 18 BRPM | WEIGHT: 283.6 LBS | SYSTOLIC BLOOD PRESSURE: 130 MMHG | HEART RATE: 96 BPM | DIASTOLIC BLOOD PRESSURE: 80 MMHG | HEIGHT: 69 IN | TEMPERATURE: 97.7 F

## 2025-07-22 DIAGNOSIS — I10 HYPERTENSION, BENIGN: ICD-10-CM

## 2025-07-22 DIAGNOSIS — E11.3293 TYPE 2 DIABETES MELLITUS WITH BOTH EYES AFFECTED BY MILD NONPROLIFERATIVE RETINOPATHY WITHOUT MACULAR EDEMA, WITHOUT LONG-TERM CURRENT USE OF INSULIN: ICD-10-CM

## 2025-07-22 DIAGNOSIS — J01.00 ACUTE NON-RECURRENT MAXILLARY SINUSITIS: Primary | ICD-10-CM

## 2025-07-22 DIAGNOSIS — J30.1 SEASONAL ALLERGIC RHINITIS DUE TO POLLEN: ICD-10-CM

## 2025-07-22 PROCEDURE — 99214 OFFICE O/P EST MOD 30 MIN: CPT | Performed by: FAMILY MEDICINE

## 2025-07-22 PROCEDURE — 3079F DIAST BP 80-89 MM HG: CPT | Performed by: FAMILY MEDICINE

## 2025-07-22 PROCEDURE — 3075F SYST BP GE 130 - 139MM HG: CPT | Performed by: FAMILY MEDICINE

## 2025-07-22 PROCEDURE — 3051F HG A1C>EQUAL 7.0%<8.0%: CPT | Performed by: FAMILY MEDICINE

## 2025-07-22 PROCEDURE — 1160F RVW MEDS BY RX/DR IN RCRD: CPT | Performed by: FAMILY MEDICINE

## 2025-07-22 PROCEDURE — 1159F MED LIST DOCD IN RCRD: CPT | Performed by: FAMILY MEDICINE

## 2025-07-22 PROCEDURE — 1126F AMNT PAIN NOTED NONE PRSNT: CPT | Performed by: FAMILY MEDICINE

## 2025-07-22 RX ORDER — LORATADINE 10 MG/1
10 TABLET ORAL DAILY
Start: 2025-07-22

## 2025-07-22 RX ORDER — DOXYCYCLINE 100 MG/1
100 CAPSULE ORAL 2 TIMES DAILY
Qty: 20 CAPSULE | Refills: 0 | Status: SHIPPED | OUTPATIENT
Start: 2025-07-22

## 2025-07-22 RX ORDER — DAPAGLIFLOZIN 10 MG/1
10 TABLET, FILM COATED ORAL DAILY
COMMUNITY
Start: 2025-07-18

## 2025-07-22 RX ORDER — FLUTICASONE PROPIONATE 50 MCG
2 SPRAY, SUSPENSION (ML) NASAL DAILY
Start: 2025-07-22

## 2025-07-22 NOTE — ASSESSMENT & PLAN NOTE
Diabetes is stable. A1c 7.3 05/2025  Continue current treatment regimen.  Diabetes will be reassessed in 1 month

## 2025-07-22 NOTE — PROGRESS NOTES
Chief Complaint  Sinusitis, Hypertension, and Diabetes    Subjective     CC  Problem List  Visit Diagnosis   Encounters  Notes  Medications  Labs  Result Review Imaging  Media    Thor Crouch presents to [facility] for   Sinusitis  This is a new problem. The current episode started in the past 7 days (Started Saturday). The problem is unchanged. There has been no fever. He is experiencing no pain. Associated symptoms include congestion, coughing, ear pain, headaches, sinus pressure and a sore throat. Pertinent negatives include no chills, diaphoresis, hoarse voice, neck pain, shortness of breath, sneezing or swollen glands. (Sinus drainage) Past treatments include oral decongestants. The treatment provided mild relief.   Hypertension  Chronicity:  Chronic  Onset:  More than 1 year ago  Progression since onset:  Stable  Condition status:  Controlled  Associated symptoms: headaches and malaise/fatigue    Associated symptoms: no anxiety, no blurred vision, no chest pain, no neck pain, no palpitations, no peripheral edema, no shortness of breath, no sweats, no dyspnea with exertion and no dizziness    CAD risks:  Dyslipidemia, obesity, male gender and diabetes mellitus  Current therapy:  ACE inhibitors and beta blockers  End-organ damage: kidney disease and retinopathy    Identifiable causes: chronic renal disease and sleep apnea    Diabetes  Visit type:  Follow-up  Diabetes type:  Type 2  Associated symptoms:     no blurred vision, no chest pain, no fatigue, no foot ulcerations, no polydipsia, no polyuria, no visual change, no weakness and no weight loss    Hypoglycemia symptoms:     headaches      no confusion, no dizziness, no hunger, no mood changes, no pallor, no sleepiness, no speech difficulty and no sweats    Diabetic complications:     nephropathy and retinopathy    CAD risks:  Diabetes mellitus, hypertension, male sex, obesity, family history and dyslipidemia  Current  "treatments:  Insulin injections  Treatment compliance:  Most of the time  Blood glucose ranges (mg/dl):      140-180  Blood glucose ranges (mg/dl) comment:  Per patient's Dexcop his glucose is 164  Current diet:  Generally healthy  Meal planning:  None  Dietitian visit: no    ACE-I / ARB:  Is being taken  Eye exam current: yes    Sees podiatrist: no    Additional information:  A1c on 5/16/25 was 7.3      Review of Systems   Constitutional:  Positive for malaise/fatigue. Negative for chills, diaphoresis, fatigue, fever and unexpected weight loss.   HENT:  Positive for congestion, ear pain, sinus pressure and sore throat. Negative for hoarse voice, sneezing and swollen glands.    Eyes:  Negative for blurred vision.   Respiratory:  Positive for cough. Negative for shortness of breath and wheezing.    Cardiovascular:  Negative for chest pain and palpitations.   Gastrointestinal:  Negative for abdominal pain, diarrhea, nausea and vomiting.   Endocrine: Negative for cold intolerance, heat intolerance, polydipsia and polyuria.   Musculoskeletal:  Negative for neck pain.   Skin:  Negative for pallor.   Neurological:  Negative for dizziness, speech difficulty, weakness and confusion.   Hematological:  Negative for adenopathy. Does not bruise/bleed easily.        Objective   Vital Signs:   /80 (BP Location: Right arm, Patient Position: Sitting, Cuff Size: Adult)   Pulse 96   Temp 97.7 °F (36.5 °C) (Temporal)   Resp 18   Ht 175.3 cm (69\")   Wt 129 kg (283 lb 9.6 oz)   SpO2 92%   BMI 41.88 kg/m²     Physical Exam  Constitutional:       General: He is not in acute distress.  HENT:      Right Ear: Tympanic membrane normal.      Left Ear: Tympanic membrane normal.      Nose: No congestion.      Mouth/Throat:      Pharynx: No oropharyngeal exudate or posterior oropharyngeal erythema (moderate post nasal secretions.).   Cardiovascular:      Rate and Rhythm: Normal rate and regular rhythm.      Heart sounds: No murmur " heard.  Pulmonary:      Effort: Pulmonary effort is normal.      Breath sounds: Normal breath sounds. No wheezing.   Musculoskeletal:      Cervical back: Neck supple.      Right lower leg: No edema.      Left lower leg: No edema.   Lymphadenopathy:      Cervical: No cervical adenopathy.   Skin:     Findings: No rash.   Neurological:      Mental Status: He is alert.        Result Review :Labs  Result Review  Imaging  Med Tab  Media                 Assessment and Plan CC Problem List  Visit Diagnosis  ROS  Review (Popup)  Health Maintenance  Quality  BestPractice  Medications  SmartSets  SnapShot Encounters  Media  Problem List Items Addressed This Visit          Unprioritized    Hypertension, benign    Current Assessment & Plan   Hypertension is stable and controlled ater increase in metoprolol.   Continue current treatment regimen.  Blood pressure will be reassessed in 1 month.         Type 2 diabetes mellitus with both eyes affected by mild nonproliferative retinopathy without macular edema, without long-term current use of insulin    Overview   Tresibia 50 units twice a day  Novolin R 50-50 units with meals.  He is developing hypoglycemia and hyperglycemia due to use in the standard dose.  He will adjust dose up and down depending on his activity level and intake    He has side effects from metformin cannot tolerate this.  Off Trulicity due to cost         Current Assessment & Plan   Diabetes is stable. A1c 7.3 05/2025  Continue current treatment regimen.  Diabetes will be reassessed in 1 month         Relevant Medications    dapagliflozin Propanediol 10 MG tablet    Seasonal allergic rhinitis due to pollen    Current Assessment & Plan   Exacerbation, begin max meds and follow up if no improvement.          Relevant Medications    loratadine (CLARITIN) 10 MG tablet    fluticasone (FLONASE) 50 MCG/ACT nasal spray     Other Visit Diagnoses         Acute non-recurrent maxillary sinusitis    -  Primary     abx fluids saline flushes, Rx allergies and follow up if no gradual improvement over 1 wk.    Relevant Medications    doxycycline (MONODOX) 100 MG capsule            Follow Up Instructions Charge Capture  Follow-up Communications  Return if symptoms worsen or fail to improve.  Patient was given instructions and counseling regarding his condition or for health maintenance advice. Please see specific information pulled into the AVS if appropriate.

## 2025-07-22 NOTE — ASSESSMENT & PLAN NOTE
Hypertension is stable and controlled ater increase in metoprolol.   Continue current treatment regimen.  Blood pressure will be reassessed in 1 month.

## 2025-07-23 ENCOUNTER — TELEPHONE (OUTPATIENT)
Dept: FAMILY MEDICINE CLINIC | Facility: CLINIC | Age: 65
End: 2025-07-23
Payer: MEDICARE

## 2025-07-23 DIAGNOSIS — R05.9 COUGH, UNSPECIFIED TYPE: Primary | ICD-10-CM

## 2025-07-23 RX ORDER — BENZONATATE 100 MG/1
100 CAPSULE ORAL 3 TIMES DAILY PRN
Qty: 90 CAPSULE | Refills: 1 | Status: SHIPPED | OUTPATIENT
Start: 2025-07-23

## 2025-07-23 NOTE — TELEPHONE ENCOUNTER
Caller: Thor Crouch    Relationship: Self    Best call back number: [phone]     What medication are you requesting:  COUGH MEDICATION     What are your current symptoms: COUGH        If a prescription is needed, what is your preferred pharmacy and phone number: [id number] - [place], [place] - [address] AT [facility] [address] - [phone]  - [phone]

## 2025-08-08 LAB
ALBUMIN SERPL-MCNC: 4 G/DL (ref 3.9–4.9)
ALBUMIN/CREAT UR: 362 MG/G CREAT (ref 0–29)
ALP SERPL-CCNC: 126 IU/L (ref 44–121)
ALT SERPL-CCNC: 21 IU/L (ref 0–44)
AST SERPL-CCNC: 18 IU/L (ref 0–40)
BILIRUB SERPL-MCNC: 0.3 MG/DL (ref 0–1.2)
BUN SERPL-MCNC: 17 MG/DL (ref 8–27)
BUN/CREAT SERPL: 15 (ref 10–24)
CALCIUM SERPL-MCNC: 9.1 MG/DL (ref 8.6–10.2)
CHLORIDE SERPL-SCNC: 103 MMOL/L (ref 96–106)
CHOLEST SERPL-MCNC: 137 MG/DL (ref 100–199)
CHOLEST/HDLC SERPL: 4 RATIO (ref 0–5)
CO2 SERPL-SCNC: 19 MMOL/L (ref 20–29)
CREAT SERPL-MCNC: 1.17 MG/DL (ref 0.76–1.27)
CREAT UR-MCNC: 107.5 MG/DL
EGFRCR SERPLBLD CKD-EPI 2021: 69 ML/MIN/1.73
GLOBULIN SER CALC-MCNC: 2.9 G/DL (ref 1.5–4.5)
GLUCOSE SERPL-MCNC: 124 MG/DL (ref 70–99)
HBA1C MFR BLD: 7.8 % (ref 4.8–5.6)
HDLC SERPL-MCNC: 34 MG/DL
LDLC SERPL CALC-MCNC: 79 MG/DL (ref 0–99)
MICROALBUMIN UR-MCNC: 389 UG/ML
POTASSIUM SERPL-SCNC: 5.2 MMOL/L (ref 3.5–5.2)
PROT SERPL-MCNC: 6.9 G/DL (ref 6–8.5)
SODIUM SERPL-SCNC: 141 MMOL/L (ref 134–144)
TRIGL SERPL-MCNC: 132 MG/DL (ref 0–149)
VLDLC SERPL CALC-MCNC: 24 MG/DL (ref 5–40)

## 2025-08-20 ENCOUNTER — OFFICE VISIT (OUTPATIENT)
Dept: FAMILY MEDICINE CLINIC | Facility: CLINIC | Age: 65
End: 2025-08-20
Payer: MEDICARE

## 2025-08-20 VITALS
WEIGHT: 281.6 LBS | BODY MASS INDEX: 41.71 KG/M2 | HEIGHT: 69 IN | TEMPERATURE: 96.9 F | HEART RATE: 81 BPM | RESPIRATION RATE: 18 BRPM | DIASTOLIC BLOOD PRESSURE: 72 MMHG | OXYGEN SATURATION: 96 % | SYSTOLIC BLOOD PRESSURE: 130 MMHG

## 2025-08-20 DIAGNOSIS — E87.5 HYPERKALEMIA: ICD-10-CM

## 2025-08-20 DIAGNOSIS — E11.3293 TYPE 2 DIABETES MELLITUS WITH BOTH EYES AFFECTED BY MILD NONPROLIFERATIVE RETINOPATHY WITHOUT MACULAR EDEMA, WITHOUT LONG-TERM CURRENT USE OF INSULIN: Primary | ICD-10-CM

## 2025-08-20 DIAGNOSIS — I10 HYPERTENSION, BENIGN: ICD-10-CM

## 2025-08-20 DIAGNOSIS — R74.8 ELEVATED ALKALINE PHOSPHATASE LEVEL: ICD-10-CM

## 2025-08-20 DIAGNOSIS — N18.2 STAGE 2 CHRONIC KIDNEY DISEASE: ICD-10-CM

## 2025-08-20 DIAGNOSIS — E78.2 MIXED HYPERLIPIDEMIA: ICD-10-CM

## 2025-08-20 DIAGNOSIS — E16.2 HYPOGLYCEMIA: ICD-10-CM

## 2025-08-20 DIAGNOSIS — E66.813 CLASS 3 SEVERE OBESITY WITH BODY MASS INDEX (BMI) OF 40.0 TO 44.9 IN ADULT: ICD-10-CM

## 2025-08-20 DIAGNOSIS — E11.21 DIABETIC NEPHROPATHY ASSOCIATED WITH TYPE 2 DIABETES MELLITUS: ICD-10-CM

## 2025-08-20 PROBLEM — N18.9 ANEMIA OF CHRONIC RENAL FAILURE: Status: ACTIVE | Noted: 2024-12-11

## 2025-08-20 PROBLEM — D63.1 ANEMIA OF CHRONIC RENAL FAILURE: Status: ACTIVE | Noted: 2024-12-11

## 2025-08-20 RX ORDER — LISINOPRIL 20 MG/1
20 TABLET ORAL 2 TIMES DAILY
Start: 2025-08-20

## 2025-08-20 RX ORDER — INSULIN DEGLUDEC 200 U/ML
60 INJECTION, SOLUTION SUBCUTANEOUS DAILY
Start: 2025-08-20

## 2025-08-20 RX ORDER — DAPAGLIFLOZIN 10 MG/1
10 TABLET, FILM COATED ORAL DAILY
Start: 2025-08-20

## 2025-08-20 RX ORDER — METOPROLOL SUCCINATE 50 MG/1
50 TABLET, EXTENDED RELEASE ORAL DAILY
Start: 2025-08-20

## 2025-08-20 RX ORDER — HUMAN INSULIN 100 [IU]/ML
50 INJECTION, SOLUTION SUBCUTANEOUS
Start: 2025-08-20

## 2025-08-20 RX ORDER — LOVASTATIN 40 MG/1
40 TABLET ORAL EVERY EVENING
Start: 2025-08-20